# Patient Record
Sex: FEMALE | Race: WHITE | NOT HISPANIC OR LATINO | Employment: OTHER
[De-identification: names, ages, dates, MRNs, and addresses within clinical notes are randomized per-mention and may not be internally consistent; named-entity substitution may affect disease eponyms.]

---

## 2018-05-16 ENCOUNTER — RECORDS - HEALTHEAST (OUTPATIENT)
Dept: ADMINISTRATIVE | Facility: OTHER | Age: 78
End: 2018-05-16

## 2018-05-18 ENCOUNTER — RECORDS - HEALTHEAST (OUTPATIENT)
Dept: ADMINISTRATIVE | Facility: OTHER | Age: 78
End: 2018-05-18

## 2018-06-01 ENCOUNTER — RECORDS - HEALTHEAST (OUTPATIENT)
Dept: ADMINISTRATIVE | Facility: OTHER | Age: 78
End: 2018-06-01

## 2018-06-13 ENCOUNTER — RECORDS - HEALTHEAST (OUTPATIENT)
Dept: ADMINISTRATIVE | Facility: OTHER | Age: 78
End: 2018-06-13

## 2018-06-22 ENCOUNTER — RECORDS - HEALTHEAST (OUTPATIENT)
Dept: ADMINISTRATIVE | Facility: OTHER | Age: 78
End: 2018-06-22

## 2018-07-03 ENCOUNTER — RECORDS - HEALTHEAST (OUTPATIENT)
Dept: ADMINISTRATIVE | Facility: OTHER | Age: 78
End: 2018-07-03

## 2018-07-24 ENCOUNTER — RECORDS - HEALTHEAST (OUTPATIENT)
Dept: ADMINISTRATIVE | Facility: OTHER | Age: 78
End: 2018-07-24

## 2018-07-25 ENCOUNTER — RECORDS - HEALTHEAST (OUTPATIENT)
Dept: ADMINISTRATIVE | Facility: OTHER | Age: 78
End: 2018-07-25

## 2018-08-07 ENCOUNTER — RECORDS - HEALTHEAST (OUTPATIENT)
Dept: ADMINISTRATIVE | Facility: OTHER | Age: 78
End: 2018-08-07

## 2018-08-08 ENCOUNTER — RECORDS - HEALTHEAST (OUTPATIENT)
Dept: ADMINISTRATIVE | Facility: OTHER | Age: 78
End: 2018-08-08

## 2018-08-21 ENCOUNTER — RECORDS - HEALTHEAST (OUTPATIENT)
Dept: ADMINISTRATIVE | Facility: OTHER | Age: 78
End: 2018-08-21

## 2018-08-24 ENCOUNTER — COMMUNICATION - HEALTHEAST (OUTPATIENT)
Dept: ONCOLOGY | Facility: CLINIC | Age: 78
End: 2018-08-24

## 2018-09-17 ENCOUNTER — AMBULATORY - HEALTHEAST (OUTPATIENT)
Dept: SURGERY | Facility: CLINIC | Age: 78
End: 2018-09-17

## 2018-09-17 ENCOUNTER — OFFICE VISIT - HEALTHEAST (OUTPATIENT)
Dept: RADIATION ONCOLOGY | Facility: CLINIC | Age: 78
End: 2018-09-17

## 2018-09-17 DIAGNOSIS — Z17.1 MALIGNANT NEOPLASM OF UPPER-INNER QUADRANT OF RIGHT BREAST IN FEMALE, ESTROGEN RECEPTOR NEGATIVE (H): ICD-10-CM

## 2018-09-17 DIAGNOSIS — C50.211 MALIGNANT NEOPLASM OF UPPER-INNER QUADRANT OF RIGHT BREAST IN FEMALE, ESTROGEN RECEPTOR NEGATIVE (H): ICD-10-CM

## 2018-09-28 ENCOUNTER — OFFICE VISIT - HEALTHEAST (OUTPATIENT)
Dept: PHYSICAL THERAPY | Facility: REHABILITATION | Age: 78
End: 2018-09-28

## 2018-09-28 DIAGNOSIS — M25.611 DECREASED RANGE OF MOTION OF RIGHT SHOULDER: ICD-10-CM

## 2018-09-28 DIAGNOSIS — I89.0 LYMPHEDEMA: ICD-10-CM

## 2018-09-28 DIAGNOSIS — L90.5 SCAR CONDITION AND FIBROSIS OF SKIN: ICD-10-CM

## 2018-09-28 DIAGNOSIS — C50.919 BREAST CANCER (H): ICD-10-CM

## 2018-09-28 DIAGNOSIS — M62.81 MUSCLE WEAKNESS (GENERALIZED): ICD-10-CM

## 2018-10-05 ENCOUNTER — OFFICE VISIT - HEALTHEAST (OUTPATIENT)
Dept: PHYSICAL THERAPY | Facility: REHABILITATION | Age: 78
End: 2018-10-05

## 2018-10-05 DIAGNOSIS — M25.611 DECREASED RANGE OF MOTION OF RIGHT SHOULDER: ICD-10-CM

## 2018-10-05 DIAGNOSIS — C50.919 BREAST CANCER (H): ICD-10-CM

## 2018-10-05 DIAGNOSIS — I89.0 LYMPHEDEMA: ICD-10-CM

## 2018-10-05 DIAGNOSIS — L90.5 SCAR CONDITION AND FIBROSIS OF SKIN: ICD-10-CM

## 2018-10-05 DIAGNOSIS — M62.81 MUSCLE WEAKNESS (GENERALIZED): ICD-10-CM

## 2018-10-08 ENCOUNTER — AMBULATORY - HEALTHEAST (OUTPATIENT)
Dept: RADIATION ONCOLOGY | Facility: CLINIC | Age: 78
End: 2018-10-08

## 2018-10-08 DIAGNOSIS — I89.0 LYMPHEDEMA OF EXTREMITY: ICD-10-CM

## 2018-10-08 DIAGNOSIS — M25.611 DECREASED RANGE OF MOTION OF RIGHT SHOULDER: ICD-10-CM

## 2018-10-08 DIAGNOSIS — C50.211 MALIGNANT NEOPLASM OF UPPER-INNER QUADRANT OF RIGHT BREAST IN FEMALE, ESTROGEN RECEPTOR NEGATIVE (H): ICD-10-CM

## 2018-10-08 DIAGNOSIS — Z17.1 MALIGNANT NEOPLASM OF UPPER-INNER QUADRANT OF RIGHT BREAST IN FEMALE, ESTROGEN RECEPTOR NEGATIVE (H): ICD-10-CM

## 2018-10-09 ENCOUNTER — COMMUNICATION - HEALTHEAST (OUTPATIENT)
Dept: VASCULAR SURGERY | Facility: CLINIC | Age: 78
End: 2018-10-09

## 2018-10-10 ENCOUNTER — COMMUNICATION - HEALTHEAST (OUTPATIENT)
Dept: ONCOLOGY | Facility: CLINIC | Age: 78
End: 2018-10-10

## 2018-10-11 ENCOUNTER — OFFICE VISIT - HEALTHEAST (OUTPATIENT)
Dept: PHYSICAL THERAPY | Facility: REHABILITATION | Age: 78
End: 2018-10-11

## 2018-10-11 DIAGNOSIS — I89.0 LYMPHEDEMA: ICD-10-CM

## 2018-10-11 DIAGNOSIS — M25.611 DECREASED RANGE OF MOTION OF RIGHT SHOULDER: ICD-10-CM

## 2018-10-11 DIAGNOSIS — C50.919 BREAST CANCER (H): ICD-10-CM

## 2018-10-11 DIAGNOSIS — L90.5 SCAR CONDITION AND FIBROSIS OF SKIN: ICD-10-CM

## 2018-10-11 DIAGNOSIS — M62.81 MUSCLE WEAKNESS (GENERALIZED): ICD-10-CM

## 2018-10-16 ENCOUNTER — RECORDS - HEALTHEAST (OUTPATIENT)
Dept: ADMINISTRATIVE | Facility: OTHER | Age: 78
End: 2018-10-16

## 2018-10-16 ENCOUNTER — AMBULATORY - HEALTHEAST (OUTPATIENT)
Dept: RADIATION ONCOLOGY | Facility: HOSPITAL | Age: 78
End: 2018-10-16

## 2018-10-16 DIAGNOSIS — I89.0 LYMPHEDEMA OF EXTREMITY: ICD-10-CM

## 2018-10-17 ENCOUNTER — COMMUNICATION - HEALTHEAST (OUTPATIENT)
Dept: ONCOLOGY | Facility: CLINIC | Age: 78
End: 2018-10-17

## 2018-10-17 ENCOUNTER — COMMUNICATION - HEALTHEAST (OUTPATIENT)
Dept: PHYSICAL THERAPY | Facility: REHABILITATION | Age: 78
End: 2018-10-17

## 2018-10-19 ENCOUNTER — OFFICE VISIT - HEALTHEAST (OUTPATIENT)
Dept: PHYSICAL THERAPY | Facility: REHABILITATION | Age: 78
End: 2018-10-19

## 2018-10-19 DIAGNOSIS — L90.5 SCAR CONDITION AND FIBROSIS OF SKIN: ICD-10-CM

## 2018-10-19 DIAGNOSIS — I89.0 LYMPHEDEMA: ICD-10-CM

## 2018-10-19 DIAGNOSIS — M25.611 DECREASED RANGE OF MOTION OF RIGHT SHOULDER: ICD-10-CM

## 2018-10-19 DIAGNOSIS — C50.919 BREAST CANCER (H): ICD-10-CM

## 2018-10-19 DIAGNOSIS — M62.81 MUSCLE WEAKNESS (GENERALIZED): ICD-10-CM

## 2018-10-23 ENCOUNTER — OFFICE VISIT - HEALTHEAST (OUTPATIENT)
Dept: PHYSICAL THERAPY | Facility: REHABILITATION | Age: 78
End: 2018-10-23

## 2018-10-23 DIAGNOSIS — C50.919 BREAST CANCER (H): ICD-10-CM

## 2018-10-23 DIAGNOSIS — I89.0 LYMPHEDEMA: ICD-10-CM

## 2018-10-23 DIAGNOSIS — L90.5 SCAR CONDITION AND FIBROSIS OF SKIN: ICD-10-CM

## 2018-10-23 DIAGNOSIS — M62.81 MUSCLE WEAKNESS (GENERALIZED): ICD-10-CM

## 2018-10-23 DIAGNOSIS — M25.611 DECREASED RANGE OF MOTION OF RIGHT SHOULDER: ICD-10-CM

## 2018-10-29 ENCOUNTER — RECORDS - HEALTHEAST (OUTPATIENT)
Dept: ADMINISTRATIVE | Facility: OTHER | Age: 78
End: 2018-10-29

## 2018-10-31 ENCOUNTER — COMMUNICATION - HEALTHEAST (OUTPATIENT)
Dept: TELEHEALTH | Facility: CLINIC | Age: 78
End: 2018-10-31

## 2018-10-31 ENCOUNTER — OFFICE VISIT - HEALTHEAST (OUTPATIENT)
Dept: RADIATION ONCOLOGY | Facility: HOSPITAL | Age: 78
End: 2018-10-31

## 2018-10-31 ENCOUNTER — AMBULATORY - HEALTHEAST (OUTPATIENT)
Dept: RADIATION ONCOLOGY | Facility: HOSPITAL | Age: 78
End: 2018-10-31

## 2018-10-31 DIAGNOSIS — Z17.1 MALIGNANT NEOPLASM OF UPPER-INNER QUADRANT OF RIGHT BREAST IN FEMALE, ESTROGEN RECEPTOR NEGATIVE (H): ICD-10-CM

## 2018-10-31 DIAGNOSIS — C50.211 MALIGNANT NEOPLASM OF UPPER-INNER QUADRANT OF RIGHT BREAST IN FEMALE, ESTROGEN RECEPTOR NEGATIVE (H): ICD-10-CM

## 2018-10-31 LAB
CREAT SERPL-MCNC: 0.69 MG/DL (ref 0.6–1.1)
GFR SERPL CREATININE-BSD FRML MDRD: >60 ML/MIN/1.73M2

## 2018-11-08 ENCOUNTER — OFFICE VISIT - HEALTHEAST (OUTPATIENT)
Dept: RADIATION ONCOLOGY | Facility: CLINIC | Age: 78
End: 2018-11-08

## 2018-11-08 DIAGNOSIS — Z17.1 MALIGNANT NEOPLASM OF UPPER-INNER QUADRANT OF RIGHT BREAST IN FEMALE, ESTROGEN RECEPTOR NEGATIVE (H): ICD-10-CM

## 2018-11-08 DIAGNOSIS — C50.211 MALIGNANT NEOPLASM OF UPPER-INNER QUADRANT OF RIGHT BREAST IN FEMALE, ESTROGEN RECEPTOR NEGATIVE (H): ICD-10-CM

## 2018-11-12 ENCOUNTER — RECORDS - HEALTHEAST (OUTPATIENT)
Dept: ADMINISTRATIVE | Facility: OTHER | Age: 78
End: 2018-11-12

## 2018-11-12 ENCOUNTER — OFFICE VISIT - HEALTHEAST (OUTPATIENT)
Dept: VASCULAR SURGERY | Facility: CLINIC | Age: 78
End: 2018-11-12

## 2018-11-12 DIAGNOSIS — I97.2 POST-MASTECTOMY LYMPHEDEMA SYNDROME: ICD-10-CM

## 2018-11-12 DIAGNOSIS — C50.911 BREAST CANCER, STAGE 2, RIGHT (H): ICD-10-CM

## 2018-11-12 DIAGNOSIS — C50.211 MALIGNANT NEOPLASM OF UPPER-INNER QUADRANT OF RIGHT BREAST IN FEMALE, ESTROGEN RECEPTOR NEGATIVE (H): ICD-10-CM

## 2018-11-12 DIAGNOSIS — I89.0 ACQUIRED LYMPHEDEMA OF LEG: ICD-10-CM

## 2018-11-12 DIAGNOSIS — M24.511 CONTRACTURE OF RIGHT SHOULDER: ICD-10-CM

## 2018-11-12 DIAGNOSIS — Z17.1 MALIGNANT NEOPLASM OF UPPER-INNER QUADRANT OF RIGHT BREAST IN FEMALE, ESTROGEN RECEPTOR NEGATIVE (H): ICD-10-CM

## 2018-11-12 ASSESSMENT — MIFFLIN-ST. JEOR: SCORE: 1136.78

## 2018-11-15 ENCOUNTER — OFFICE VISIT - HEALTHEAST (OUTPATIENT)
Dept: RADIATION ONCOLOGY | Facility: CLINIC | Age: 78
End: 2018-11-15

## 2018-11-15 DIAGNOSIS — C50.211 MALIGNANT NEOPLASM OF UPPER-INNER QUADRANT OF RIGHT BREAST IN FEMALE, ESTROGEN RECEPTOR NEGATIVE (H): ICD-10-CM

## 2018-11-15 DIAGNOSIS — Z17.1 MALIGNANT NEOPLASM OF UPPER-INNER QUADRANT OF RIGHT BREAST IN FEMALE, ESTROGEN RECEPTOR NEGATIVE (H): ICD-10-CM

## 2018-11-21 ENCOUNTER — OFFICE VISIT - HEALTHEAST (OUTPATIENT)
Dept: RADIATION ONCOLOGY | Facility: CLINIC | Age: 78
End: 2018-11-21

## 2018-11-21 DIAGNOSIS — C50.211 MALIGNANT NEOPLASM OF UPPER-INNER QUADRANT OF RIGHT BREAST IN FEMALE, ESTROGEN RECEPTOR NEGATIVE (H): ICD-10-CM

## 2018-11-21 DIAGNOSIS — Z17.1 MALIGNANT NEOPLASM OF UPPER-INNER QUADRANT OF RIGHT BREAST IN FEMALE, ESTROGEN RECEPTOR NEGATIVE (H): ICD-10-CM

## 2018-11-27 ENCOUNTER — RECORDS - HEALTHEAST (OUTPATIENT)
Dept: ADMINISTRATIVE | Facility: OTHER | Age: 78
End: 2018-11-27

## 2018-11-29 ENCOUNTER — OFFICE VISIT - HEALTHEAST (OUTPATIENT)
Dept: PHYSICAL THERAPY | Facility: REHABILITATION | Age: 78
End: 2018-11-29

## 2018-11-29 ENCOUNTER — OFFICE VISIT - HEALTHEAST (OUTPATIENT)
Dept: RADIATION ONCOLOGY | Facility: CLINIC | Age: 78
End: 2018-11-29

## 2018-11-29 DIAGNOSIS — L90.5 SCAR CONDITION AND FIBROSIS OF SKIN: ICD-10-CM

## 2018-11-29 DIAGNOSIS — M62.81 MUSCLE WEAKNESS (GENERALIZED): ICD-10-CM

## 2018-11-29 DIAGNOSIS — Z17.1 MALIGNANT NEOPLASM OF UPPER-INNER QUADRANT OF RIGHT BREAST IN FEMALE, ESTROGEN RECEPTOR NEGATIVE (H): ICD-10-CM

## 2018-11-29 DIAGNOSIS — M25.611 DECREASED RANGE OF MOTION OF RIGHT SHOULDER: ICD-10-CM

## 2018-11-29 DIAGNOSIS — I89.0 LYMPHEDEMA: ICD-10-CM

## 2018-11-29 DIAGNOSIS — C50.211 MALIGNANT NEOPLASM OF UPPER-INNER QUADRANT OF RIGHT BREAST IN FEMALE, ESTROGEN RECEPTOR NEGATIVE (H): ICD-10-CM

## 2018-12-06 ENCOUNTER — OFFICE VISIT - HEALTHEAST (OUTPATIENT)
Dept: RADIATION ONCOLOGY | Facility: CLINIC | Age: 78
End: 2018-12-06

## 2018-12-06 DIAGNOSIS — Z17.1 MALIGNANT NEOPLASM OF UPPER-INNER QUADRANT OF RIGHT BREAST IN FEMALE, ESTROGEN RECEPTOR NEGATIVE (H): ICD-10-CM

## 2018-12-06 DIAGNOSIS — C50.211 MALIGNANT NEOPLASM OF UPPER-INNER QUADRANT OF RIGHT BREAST IN FEMALE, ESTROGEN RECEPTOR NEGATIVE (H): ICD-10-CM

## 2018-12-13 ENCOUNTER — OFFICE VISIT - HEALTHEAST (OUTPATIENT)
Dept: RADIATION ONCOLOGY | Facility: CLINIC | Age: 78
End: 2018-12-13

## 2018-12-13 DIAGNOSIS — C50.211 MALIGNANT NEOPLASM OF UPPER-INNER QUADRANT OF RIGHT BREAST IN FEMALE, ESTROGEN RECEPTOR NEGATIVE (H): ICD-10-CM

## 2018-12-13 DIAGNOSIS — Z17.1 MALIGNANT NEOPLASM OF UPPER-INNER QUADRANT OF RIGHT BREAST IN FEMALE, ESTROGEN RECEPTOR NEGATIVE (H): ICD-10-CM

## 2018-12-19 ENCOUNTER — OFFICE VISIT - HEALTHEAST (OUTPATIENT)
Dept: PHYSICAL THERAPY | Facility: REHABILITATION | Age: 78
End: 2018-12-19

## 2018-12-19 DIAGNOSIS — L90.5 SCAR CONDITION AND FIBROSIS OF SKIN: ICD-10-CM

## 2018-12-19 DIAGNOSIS — M62.81 MUSCLE WEAKNESS (GENERALIZED): ICD-10-CM

## 2018-12-19 DIAGNOSIS — M25.611 DECREASED RANGE OF MOTION OF RIGHT SHOULDER: ICD-10-CM

## 2018-12-19 DIAGNOSIS — I89.0 LYMPHEDEMA: ICD-10-CM

## 2018-12-20 ENCOUNTER — OFFICE VISIT - HEALTHEAST (OUTPATIENT)
Dept: RADIATION ONCOLOGY | Facility: CLINIC | Age: 78
End: 2018-12-20

## 2018-12-20 DIAGNOSIS — C50.211 MALIGNANT NEOPLASM OF UPPER-INNER QUADRANT OF RIGHT BREAST IN FEMALE, ESTROGEN RECEPTOR NEGATIVE (H): ICD-10-CM

## 2018-12-20 DIAGNOSIS — L73.9 FOLLICULITIS: ICD-10-CM

## 2018-12-20 DIAGNOSIS — Z17.1 MALIGNANT NEOPLASM OF UPPER-INNER QUADRANT OF RIGHT BREAST IN FEMALE, ESTROGEN RECEPTOR NEGATIVE (H): ICD-10-CM

## 2018-12-26 ENCOUNTER — OFFICE VISIT - HEALTHEAST (OUTPATIENT)
Dept: RADIATION ONCOLOGY | Facility: CLINIC | Age: 78
End: 2018-12-26

## 2018-12-26 DIAGNOSIS — Z17.1 MALIGNANT NEOPLASM OF UPPER-INNER QUADRANT OF RIGHT BREAST IN FEMALE, ESTROGEN RECEPTOR NEGATIVE (H): ICD-10-CM

## 2018-12-26 DIAGNOSIS — C50.211 MALIGNANT NEOPLASM OF UPPER-INNER QUADRANT OF RIGHT BREAST IN FEMALE, ESTROGEN RECEPTOR NEGATIVE (H): ICD-10-CM

## 2018-12-27 ENCOUNTER — COMMUNICATION - HEALTHEAST (OUTPATIENT)
Dept: ONCOLOGY | Facility: CLINIC | Age: 78
End: 2018-12-27

## 2019-01-03 ENCOUNTER — COMMUNICATION - HEALTHEAST (OUTPATIENT)
Dept: RADIATION ONCOLOGY | Facility: CLINIC | Age: 79
End: 2019-01-03

## 2019-01-28 ENCOUNTER — COMMUNICATION - HEALTHEAST (OUTPATIENT)
Dept: RADIATION ONCOLOGY | Facility: CLINIC | Age: 79
End: 2019-01-28

## 2019-02-04 ENCOUNTER — OFFICE VISIT - HEALTHEAST (OUTPATIENT)
Dept: RADIATION ONCOLOGY | Facility: CLINIC | Age: 79
End: 2019-02-04

## 2019-02-04 DIAGNOSIS — C50.211 MALIGNANT NEOPLASM OF UPPER-INNER QUADRANT OF RIGHT BREAST IN FEMALE, ESTROGEN RECEPTOR NEGATIVE (H): ICD-10-CM

## 2019-02-04 DIAGNOSIS — Z17.1 MALIGNANT NEOPLASM OF UPPER-INNER QUADRANT OF RIGHT BREAST IN FEMALE, ESTROGEN RECEPTOR NEGATIVE (H): ICD-10-CM

## 2019-02-13 ENCOUNTER — COMMUNICATION - HEALTHEAST (OUTPATIENT)
Dept: ONCOLOGY | Facility: CLINIC | Age: 79
End: 2019-02-13

## 2019-02-18 ENCOUNTER — OFFICE VISIT - HEALTHEAST (OUTPATIENT)
Dept: VASCULAR SURGERY | Facility: CLINIC | Age: 79
End: 2019-02-18

## 2019-02-18 DIAGNOSIS — Z17.1 MALIGNANT NEOPLASM OF UPPER-INNER QUADRANT OF RIGHT BREAST IN FEMALE, ESTROGEN RECEPTOR NEGATIVE (H): ICD-10-CM

## 2019-02-18 DIAGNOSIS — C50.211 MALIGNANT NEOPLASM OF UPPER-INNER QUADRANT OF RIGHT BREAST IN FEMALE, ESTROGEN RECEPTOR NEGATIVE (H): ICD-10-CM

## 2019-02-18 DIAGNOSIS — M25.611 DECREASED RANGE OF MOTION OF RIGHT SHOULDER: ICD-10-CM

## 2019-02-18 DIAGNOSIS — I97.2 POST-MASTECTOMY LYMPHEDEMA SYNDROME: ICD-10-CM

## 2019-02-18 ASSESSMENT — MIFFLIN-ST. JEOR: SCORE: 1119.09

## 2019-03-10 ENCOUNTER — COMMUNICATION - HEALTHEAST (OUTPATIENT)
Dept: RADIATION ONCOLOGY | Facility: CLINIC | Age: 79
End: 2019-03-10

## 2019-05-02 ENCOUNTER — COMMUNICATION - HEALTHEAST (OUTPATIENT)
Dept: ADMINISTRATIVE | Facility: HOSPITAL | Age: 79
End: 2019-05-02

## 2019-05-20 ENCOUNTER — OFFICE VISIT - HEALTHEAST (OUTPATIENT)
Dept: RADIATION ONCOLOGY | Facility: CLINIC | Age: 79
End: 2019-05-20

## 2019-05-20 DIAGNOSIS — Z17.1 MALIGNANT NEOPLASM OF UPPER-INNER QUADRANT OF RIGHT BREAST IN FEMALE, ESTROGEN RECEPTOR NEGATIVE (H): ICD-10-CM

## 2019-05-20 DIAGNOSIS — C50.211 MALIGNANT NEOPLASM OF UPPER-INNER QUADRANT OF RIGHT BREAST IN FEMALE, ESTROGEN RECEPTOR NEGATIVE (H): ICD-10-CM

## 2019-06-12 ENCOUNTER — OFFICE VISIT - HEALTHEAST (OUTPATIENT)
Dept: VASCULAR SURGERY | Facility: CLINIC | Age: 79
End: 2019-06-12

## 2019-06-12 DIAGNOSIS — Z17.1 MALIGNANT NEOPLASM OF UPPER-INNER QUADRANT OF RIGHT BREAST IN FEMALE, ESTROGEN RECEPTOR NEGATIVE (H): ICD-10-CM

## 2019-06-12 DIAGNOSIS — Z85.3 HISTORY OF LEFT BREAST CANCER: ICD-10-CM

## 2019-06-12 DIAGNOSIS — I97.2 POST-MASTECTOMY LYMPHEDEMA SYNDROME: ICD-10-CM

## 2019-06-12 DIAGNOSIS — M25.611 DECREASED RANGE OF MOTION OF RIGHT SHOULDER: ICD-10-CM

## 2019-06-12 DIAGNOSIS — C50.211 MALIGNANT NEOPLASM OF UPPER-INNER QUADRANT OF RIGHT BREAST IN FEMALE, ESTROGEN RECEPTOR NEGATIVE (H): ICD-10-CM

## 2019-06-12 ASSESSMENT — MIFFLIN-ST. JEOR: SCORE: 1118.18

## 2019-06-24 ENCOUNTER — COMMUNICATION - HEALTHEAST (OUTPATIENT)
Dept: OTHER | Facility: CLINIC | Age: 79
End: 2019-06-24

## 2019-07-11 ENCOUNTER — COMMUNICATION - HEALTHEAST (OUTPATIENT)
Dept: OTHER | Facility: CLINIC | Age: 79
End: 2019-07-11

## 2019-07-17 ENCOUNTER — AMBULATORY - HEALTHEAST (OUTPATIENT)
Dept: VASCULAR SURGERY | Facility: CLINIC | Age: 79
End: 2019-07-17

## 2019-07-17 DIAGNOSIS — I97.2 POST-MASTECTOMY LYMPHEDEMA SYNDROME: ICD-10-CM

## 2019-07-17 DIAGNOSIS — C50.911 BREAST CANCER, STAGE 2, RIGHT (H): ICD-10-CM

## 2019-07-18 ENCOUNTER — COMMUNICATION - HEALTHEAST (OUTPATIENT)
Dept: VASCULAR SURGERY | Facility: CLINIC | Age: 79
End: 2019-07-18

## 2019-08-06 ENCOUNTER — COMMUNICATION - HEALTHEAST (OUTPATIENT)
Dept: ONCOLOGY | Facility: CLINIC | Age: 79
End: 2019-08-06

## 2020-06-18 ENCOUNTER — OFFICE VISIT - HEALTHEAST (OUTPATIENT)
Dept: VASCULAR SURGERY | Facility: CLINIC | Age: 80
End: 2020-06-18

## 2020-06-18 DIAGNOSIS — I97.2 POST-MASTECTOMY LYMPHEDEMA SYNDROME: ICD-10-CM

## 2020-06-18 DIAGNOSIS — Z17.1 MALIGNANT NEOPLASM OF UPPER-INNER QUADRANT OF RIGHT BREAST IN FEMALE, ESTROGEN RECEPTOR NEGATIVE (H): ICD-10-CM

## 2020-06-18 DIAGNOSIS — C50.211 MALIGNANT NEOPLASM OF UPPER-INNER QUADRANT OF RIGHT BREAST IN FEMALE, ESTROGEN RECEPTOR NEGATIVE (H): ICD-10-CM

## 2020-06-18 DIAGNOSIS — Z85.3 HISTORY OF LEFT BREAST CANCER: ICD-10-CM

## 2020-06-18 DIAGNOSIS — M25.611 DECREASED RANGE OF MOTION OF RIGHT SHOULDER: ICD-10-CM

## 2020-06-24 ENCOUNTER — COMMUNICATION - HEALTHEAST (OUTPATIENT)
Dept: VASCULAR SURGERY | Facility: CLINIC | Age: 80
End: 2020-06-24

## 2021-02-03 ENCOUNTER — AMBULATORY - HEALTHEAST (OUTPATIENT)
Dept: NURSING | Facility: CLINIC | Age: 81
End: 2021-02-03

## 2021-02-24 ENCOUNTER — AMBULATORY - HEALTHEAST (OUTPATIENT)
Dept: NURSING | Facility: CLINIC | Age: 81
End: 2021-02-24

## 2021-05-28 NOTE — PROGRESS NOTES
Patient here ambulatory accompanied by  for follow up s/p radiation for her breast cancer.  Patient feeling much better than last visit when she was dealing with pneumonia.  Still doing ROM and seeing Dr. Jaramillo for lymphedema and tightness in axilla.  Stamina greatly improved, still has neuropathies but feels this is slightly better as well.  Seen by Dr. Barrera.  Plan RTC for follow up as directed by physician.

## 2021-05-28 NOTE — PROGRESS NOTES
[facility] Radiation Oncology Follow Up Note    Patient: Jody Ch  MRN: [id number]  Date of Service: 05/20/2019    Assessment:     1. Malignant neoplasm of upper-inner quadrant of right breast in female, 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)        Distress Assessment Score  Distress Assessment Score: No distress  Interventions  Distress Assessment Intervention: Provider Notified  Body site: Breast     Impression/Plan:   Right breast cancer, s/p mastectomy (no reconstruction) 6/1/2018, triple negative, 2.5 cm IDC grade II, lymphovascular invasion present, 4/13 LN pos with largest being 1.2 cm. s/p ddAC + Neulasta and Taxol. Hx of left breast cancer ER/OK pos 2006, s/p left mastectomy + 5 years Arimidex. RUE end ROM limited, gradually improving, seeing Dr. Jaramillo.     1. ROM improved or at least stable from previous visit, although still not completely full. Still some tightness, admits to forgetting at times to do ROM.  Discussed ongoing importance of massage and  ROM exercises and keep F/U with Dr. Jaramillo for further evaluation and treatment options.  2. Return to myself PRN.  3. No evidence of disease    Face to face time  25 minutes with > 75% spent on consultation, education and coordination of care.    Subjective:      HPI: Jody Ch is a 78 y.o. female who was treated 3D conformal radiation therapy for right sided breast cancer, s/p right mastectomy and left mastectomy (2006).     The patient was originally diagnosed with left breast cancer in 2006, IDC grade II, DCIS, ER/OK pos, HER-2 neg. She subsequently underwent radical mastectomy and completed 5 years of Arimidex. No radiation at that time.     Her most recent screening mammogram in May 2018 showed an abnormality in the upper quadrant of the right breast. She subsequently underwent US left breast biopsy which showed IDC grade I, ER/OK neg. She then had right mastectomy with SLN biopsy on 6/1/2018, final pathology returned with 2.5 cm IDC  grade II, lymphovascular invasion present, 4/13 LN pos with largest being 1.2 cm, HER-2 negative by FISH.      The patient has underwent PET CT without evidence of metastatic disease. A left thyroid nodule has been biopsied which was negative. CT chest showing stable lung nodules. She has completed 4 cycles ddAC + Neulasta support chemotherapy. She started Taxol on 9/04/2018 and finished C4D1 on 10/16/2018. She required PT for ROM and lymphedema.     SITE TREATED: right CW  TOTAL DOSE: 6040  NUMBER OF FRACTIONS: 33  DATES COMPLETED: 11/08/2018 - 12/27/2018  CONCURRENT CHEMOTHERAPY: No  ADJUVANT THERAPY:Yes     She tolerated the treatment without unexpected side effects.     She presents today for routine follow up regarding her range of motion. She continues to massage the area and occasionally performs ROM exercises. Otherwise no complaints.     Current Outpatient Medications   Medication Sig Dispense Refill     acetaminophen (TYLENOL) 500 MG tablet Take 1,000 mg by mouth every 6 (six) hours as needed.       calcium carbonate (OS-PENNY) 500 mg calcium (1,250 mg) chewable tablet Chew 500 mg 3 (three) times a day.       cholecalciferol, vitamin D3, (VITAMIN D3) 400 unit cap Take 2,000 Units by mouth daily.       cyanocobalamin 1000 MCG tablet Take 1,000 mcg by mouth.       diphenhydrAMINE (BENADRYL) 25 mg tablet Take 12.5 mg by mouth at bedtime as needed.       escitalopram oxalate (LEXAPRO) 10 MG tablet Take 10 mg by mouth daily.       Current Facility-Administered Medications   Medication Dose Route Frequency Provider Last Rate Last Dose     heparin 100 unit/mL lockflush (PF) porcine 300-600 Units  300-600 Units Intravenous PRN Janine Barrera MD   600 Units at 10/31/18 1200       The following portions of the patient's history were reviewed and updated as appropriate: allergies, current medications, past family history, past medical history, past social history, past surgical history and problem  list.    Review of Systems    General  Constitutional (WDL): Exceptions to WDL  Fatigue: Fatigue relieved by rest(improving)  EENT  Eye Disorder (WDL): Exceptions to WDL(wears glasses)  Dry Eye: Asymptomatic, clinical or diagnostic observations only, mild symptoms relieved by lubricants  Ear Disorder (WDL): All ear disorder elements are within defined limits  Respiratory       Respiratory (WDL): Within Defined Limits  Cardiovascular  Cardiovascular (WDL): All cardiovascular elements are within defined limits  Endocrine     Gastrointestinal  Gastrointestinal (WDL): Exceptions to WDL  Dysgeusia: Altered taste but no change in diet(still not normal but improving)  Musculoskeletal  Musculoskeletal and Connetive Tissue Disorders (WDL): Exceptions to WDL  Arthralgia: Mild pain(occ legs & feet)  Integumentary               Integumentary (WDL): All integumentary elements are within defined limits  Neurological  Neurosensory (WDL): Exceptions to WDL  Peripheral Motor Neuropathy: Moderate symptoms, limiting instrumental ADL(feet, fingers, tongue improving )  Ataxia: Asymptomatic, clinical or diagnostic observations only, intervention not indicated(feet neuropathies)  Peripheral Sensory Neuropathy: Moderate symptoms, limiting instrumental ADL(feet, fingers, tongue improving)  Psychological/Emotional   Patient Coping: Accepting  Hematological/Lymphatic  Lymph (WDL): All lymph disorder elements are within defined limits  Dermatologic     Genitourinary/Reproductive  Genitourinary (WDL): All genitourinary elements are within defined limits  Reproductive     Pain              Currently in Pain: Yes  Pain Score (Initial OR Reassessment): No/Denies Pain  Pain Frequency: Intermittent  Location: legs & feet  Pain Intervention(s): Home medication  Response to Interventions: good  AUA Assessment                                  Accompanied by  Accompanied by: Family Member      Objective:     Physical Exam    Vitals:    05/20/19 1105    BP: 128/72   Pulse: 86   Temp: 98.3  F (36.8  C)   TempSrc: Oral   SpO2: 96%   Weight: 154 lb 3.2 oz (69.9 kg)        General: No obvious distress, comfortable appearing.  Cooperative in conversation.   Head: Normocephalic, atraumatic.   ENT: pupils are equal, round and reactive. Oromucosa moist.  Lungs: Normal respiratory effort.  Breasts: Radiation skin reaction resolved.   Skin: Skin color, texture, turgor normal. No rashes or lesions  MSK: No lymphedema, near full ROM of RUE, full ROM LUE.  Neurologic: Grossly normal.  Psych: affect normal, thought content appropriate.       Recent Labs: No results found for this or any previous visit (from the past 168 hour(s)).    Imaging: Imaging results 30 days: No results found.    IJanine MD personally performed the services described in this documentation, as scribed by Harshad Agarwal in my presence, and it is both accurate and complete.    Signed by: Janine Barrera MD, MPH

## 2021-05-28 NOTE — TELEPHONE ENCOUNTER
Left message for patient to call to reschedule her 5/9/19 f/u w/Dr. Barrera. Relayed that 5/13 or 5/20 morning appointments are available.    Patient returned call.  Follow-up rescheduled for 5/20/19 @ 11:30.

## 2021-05-29 NOTE — PATIENT INSTRUCTIONS - HE
Please call one of the [place] locations below to schedule an appointment. If you received a prescription please bring it with you to your appointment. Some locations are limited to what they carry.    Office Locations    [facility]  Home Medical Equipment  [address], [address], [place] [address]   Phone: [phone]    Orthotics and Prosthetics ([facility])    [address], [address], [place] [address]  Phone: [phone]      Continues exercising and wearing compression sleeve. Call to [facility] Tel  if you do have any question.     Thank you for choosing [facility].

## 2021-05-29 NOTE — PROGRESS NOTES
Doing therapy exercises at home. Tingling in fingers, she believes it is from chemo. Has compression sleeve but hasn't needed to wear it recently.

## 2021-05-29 NOTE — PROGRESS NOTES
Date Of Service: 06/12/2019    Date last Seen:  02/18/19    PCP: Helga Olmedo MD    Impression:  1. Bilateral post mastectomy lymphedema-excellent control  2. Right shoulder contracture with fibrosis and scarring improved  3. 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 Questions were answered.   present.  2. Doing well with range of motion.  Continue.  3. She will continue her exercises.  She knows how to wear her compression appropriately.  We discussed how to increase her exercise program.  4. New prostheses and bras.  Written.  5. Patient will follow up in 1 year or when needed.    Time spent with patient 15 minutes, with greater than 50% time in consultation, education and coordination of care, excluding procedures.  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   In May 2018 she was diagnosed with grade 1 ER WV negative breast carcinoma treated with right mastectomy with lymph node dissection done with final pathology being a 2.5 cm grade 2 here to negative ER WV negative breast carcinoma followed by chemotherapy and radiation.  She was felt to be at risk for bilateral arm lymphedema.  Her main issue however is significant tightness in the shoulders with contractures especially on the right side.  She was sent to therapy which was beneficial.  She has continued her exercises.  She is here for her follow-up.  She states things are going well.  There is been no evidence of new  cancer.  Her checks have been clear.  She is completed all her treatments.  She has been very good about doing her regular range of motion and stretching.  Her  is here with her and he also helps her.  She has the numbness in the hands and feet distally from the chemotherapy.  There has been otherwise no new numbness, tingling or weakness.  There have been no new masses, rashes, or swellings of any other joints. There have been no infections.  There has been no new unexplained weight loss, loss of appetite, nausea and/or vomiting, shortness of breath, weight loss and chest pain.  She has not had any problems with significant swelling and she has been wearing the compression appropriately.  She is starting to try to get back to a more active exercise program as she is feeling better.      Past Medical History:   Diagnosis Date     Anxiety state 1/31/2017     Atrophic vaginitis 5/8/2014    Overview:  UPDATE: Followed-up with Dr. Cassie Arteaga on 5-8-14. Noted that patient did not want to pay for the estrogen cream. Impression female stress incontinence and atrophic vaginitis.     Bilateral leg paresthesia 5/13/2014    Overview:  US FRANCISCO W EXERCISE BILATERAL 5-14-14: IMPRESSION: RIGHT LOWER EXTREMITY: FRANCISCO at rest is normal with a normal response to exercise. These findings would not be consistent with symptoms of arterial claudication. LEFT LOWER EXTREMITY: FRANCISCO at rest is normal with a normal response to exercise. These findings would not be consistent with symptoms of arterial claudication. US VENOUS LOWER EXTREMITY LEFT 5-14-14: Left leg veins are negative for DVT. EMG 5-19-14: Borderline abnormal EMG due to low amplitude compound motor action potential in the motor nerves with normal conduction velocities. This could suggest early axonal polyneuropathy. Such changes can be seen in patient's with prediabetes.     Bone pain 7/24/2018     Breast cancer (H)      Breast cancer, stage 2, right (H) 5/16/2018     Overview:  Added automatically from request for surgery [id number]     Chronic cough 2/12/2018    Overview:  XR CHEST 2 VIEWS PA AND LATERAL 12-4-17: Normal heart size and pulmonary vascularity. Tiny granulomas with some fibrosis in the right lung base. The left lung is clear. Slight deviation of the upper trachea from left to right presumably due to thyroid enlargement stable. No change from previous. No acute process is identified. No focal pulmonary infiltrates.     Chronic diarrhea 5/12/2017    Overview:  UPDATE: COLONOSCOPY DIAGNOSTIC 7-25-17: Aphthous ulcer of ileum. Diverticulosis of colon without diverticulitis. Pathology Results: NEOTERMINAL ILEUM,  BIOPSY: Nonspecific chronic active ileitis. No dysplasia or malignancy. COLON, RANDOM, BIOPSY: Normal colonic mucosa STOOL TESTS on 5-12-17 for culture, Clostridium dificile toxin, WBC, Giardia antigen, Campylobacter, Shiga toxin, Stool for ova and parasite were negative       Chronic pain of right knee 10/23/2017    Overview:  XR KNEE 3 VIEWS RIGHT 10-23-17: Negative knee. No fracture or dislocation. No joint effusion.     Chronic rhinitis 6/8/2017     Chronic right hip pain 10/23/2017    Overview:  XR HIP 2 OR 3 VIEWS W PELVIS RIGHT 10-23-17: Arthritic change right hip. Pelvis otherwise negative.     Decreased range of motion of right shoulder 10/8/2018     Diarrhea 7/24/2018     Difficult or painful urination 12/8/2009    Overview:  UPDATE: Followed-up with Dr. Cassie Arteaga on 5-8-14. Noted that patient did not want to pay for the estrogen cream. Impression female stress incontinence and atrophic vaginitis. Exacerbation of her dysuria symptoms. She has not used her estrace vaginal cream due to cost noted 9-10-13. Urinalysis negative on 9-10-13. Urinalysis and urine microscopy showed some signs of urinary tract infection 9-12-13 . Prescription for ciprofloxacin 250 mg twice daily for 7 days sent to pharmacy. Followed-up with Dr. Bayron Seymour 11-5-13. Rx  ciprofloxacin 500 mg twice daily (#30 tablets) so she can self-treat as needed for signs of UTI. Estrace not covered by insurance and so not refilled. Consider Premarin cream.  Dysuria 12-8-09 with negative UA and urine culture. Dysuria 9-28-10 with negative UA and urine microscopy. Advised urology consult 12-8-09 and 9-28-10 if problem recurrent. Advised Pyridium prn on 9-28-10.Previous biofeedback treatment for urge incontinence, urinary frequency and dysuria. Followed-up M     Drug-induced nausea and vomiting 7/17/2018     Dry mouth 5/24/2016     Gallbladder polyp 4/14/2012    Overview:  UPDATE:US ABDOMEN LIMITED RUQ 4-18-16: 5 mm stone versus polyp in the gallbladder, decreased in size since comparison study where measured 7 mm. The gallbladder is contracted despite being NPO, which limits evaluation. Benign parapelvic cyst in the lower pole of the right kidney measuring 2.8 cm, is unchanged. US ABDOMEN LIMITED RUQ on 2-3-14:  Stable appearance of a 8 mm cholesterol polyp within the medial wall of the gallbladder. Incidentally noted is a small 7 mm hyperechoic focus within the right hepatic lobe. This likely reflects an incidental small cavernous hemangioma. US ABDOMEN LIMITED 12-3-12: Stable appearance of a 7 mm cholesterol polyp within the gallbladder.  CT ABD/PELVIS W/WO IV CONTRAST 3-23-12: Bilateral renal parapelvic cysts. Urinary bladder appears normal. Atrophic uterus and right adnexa. Either bilobed cyst or 2 separate cysts on atrophic left adnexa. Nodular foci in gallbladder suggestive of stones or polyps. Recommend gallbladder sonogram. US ABDOMEN LIMITED 4-6-12: Mul     Ganglion cyst of flexor tendon sheath of finger of right hand 5/24/2016     History of breast cancer 11/21/2006    Overview:  UPDATE: XR MAMMO UNI SCREEN FFDM RIGHT 4-14-14: ACR 1 Negative. Followed-up with oncologist Dr. Phyllis Colon on 4-29-14. Stable.    XR MAMMO UNI SCREEN FFDM RIGHT: 4-4-12, 4-5-13: ACR 2 Benign Finding.  Followed-up with Dr. Phyllis Colon 5-23-13. CBC and CMP normal except for low glucose of 55 . CA 27-29 normal.   Followed-up with Dr. Phyllis Colon 11-16-12. CBC and CMP normal except for low glucose of 67 . CA 27-29 normal. stage I infiltrating ductal carcinoma, s/p left radical mastectomy, T1c N0 M0, ER/MD (+), on Arimedex started 2-2007 and needed for 5 years (until 2-2012). Oncologist [facility] (Dr. Louise Burns). Follow-up oncologist 11-9-11. Discontinue Arimidex in  per patient request due to muscle ache. Discontinue Fosamax once current prescription is done as bone density should improve once off Arimidex. .     Hypercholesteremia 12/8/2014    Overview:  UPDATE: Rx atorvastatin (Lipitor) 1-22-15. She sent medical message on 3-18-15 requesting to take atorvastatin (Lipitor) 20 mg tablet every other day. This would not work for atorvastatin (Lipitor) . I advised to take half tablet (10 mg) daily rather than taking one tablet every other day. ASCVD Risk  10 year risk 7.9 % calculated on 12/8/2014.  Recommendation moderate to high - intensity statin.      IC (interstitial cystitis) 12/2/2010    Overview:  UPDATE: UPDATE: Followed-up with Dr. Cassie Arteaga on 5-8-14. Noted that patient did not want to pay for the estrogen cream. Impression female stress incontinence and atrophic vaginitis. Exacerbation of her dysuria symptoms. She has not used her estrace vaginal cream due to cost noted 9-10-13. Urinalysis negative on 9-10-13. Urinalysis and urine microscopy showed some signs of urinary tract infection 9-12-13 . Prescription for ciprofloxacin 250 mg twice daily for 7 days sent to pharmacy. Followed-up with Dr. Bayron Seymour 11-5-13. Rx ciprofloxacin 500 mg twice daily (#30 tablets) so she can self-treat as needed for signs of UTI. Estrace not covered by insurance and so not refilled. Consider Premarin cream.  Previous biofeedback treatment for urge incontinence, urinary frequency and dysuria.  Followed-up [facility] since 12-2-10 for interstitial cystitis. Work-up by urologist, Dr. Cassie Arteaga, since 2-27-12 regarding urinary urge incontinence and chronic interstitial cystitis. Rx estrace vaginal c     Ileitis 7/25/2017    Overview:  COLONOSCOPY DIAGNOSTIC 7-25-17: Aphthous ulcer of ileum. Diverticulosis of colon without diverticulitis. Pathology Results: NEOTERMINAL ILEUM,  BIOPSY: Nonspecific chronic active ileitis. No dysplasia or malignancy. COLON, RANDOM, BIOPSY: Normal colonic mucosa . Advised to follow-up with [facility] regarding chronic active ileitis.     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     Left thyroid nodule 4/30/2018     Lymphedema of extremity 10/8/2018     Major depression, recurrent, full remission (H) 1/31/2017    Overview:  Inpatient treatment for depression after divorce in 1983. Major depression diagnosed due to financial worries diagnosed 11-23-10 by oncologist, Louise Burns. Citalopram 20 mg daily started. Citalopram discontinued on 5-19-11 per patient request.     Malignant neoplasm of upper-inner quadrant of right breast in female, 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) 10/8/2018     Mixed stress and urge urinary incontinence 7/24/2006    Overview:  UPDATE: Followed-up with Dr. Cassie Arteaga on 5-8-14. Noted that patient did not want to pay for the estrogen cream. Impression female stress incontinence and atrophic vaginitis.  Exacerbation of her dysuria symptoms. She has not used her estrace vaginal cream due to cost noted 9-10-13. Urinalysis negative on 9-10-13. Urinalysis and urine microscopy showed some signs of urinary tract infection 9-12-13 . Prescription for ciprofloxacin 250 mg twice daily for 7 days sent to pharmacy. Followed-up with Dr. Bayron Seymour 11-5-13. Rx ciprofloxacin 500 mg twice daily (#30  tablets) so she can self-treat as needed for signs of UTI. Estrace not covered by insurance and so not refilled. Consider Premarin cream. Previous biofeedback treatment for urge incontinence, urinary frequency and dysuria. Followed-up [facility] since 12-2-10 for interstitial cystitis. Work-up by urologist, Dr. Cassie Arteaga, since 2-27-12 regarding urinary urge incontinence and chronic interstitial cystitis. Rx estrace vaginal cream wit     Nasal congestion 6/5/2014     Osteopenia 11/1/2006    Overview:  UPDATE:  XR DXA BONE DENSITY 2 SITES AXIAL 5/16/2018: Osteopenia. Lumbar Spine L1-L4 T-score -1.8 . Mean Bilateral Femoral Neck T-score -1.4 . FRAX Results: 10-year probability of major osteoporotic fracture is 10.8%, and of hip fracture is 2%, based on left femoral neck BMD. Basic bone health recommended.  XR DXA BONE DENSITY 2 SITES AXIAL 4-18-16: T score AP spine -2.1, Left femoral neck -1.1, R femoral neck -1.5. FRAX major osteoporotic fracture 11% and FRAX hip fracture score 2.0 %. Basic bone health recommended.  DEXA scan 4-14-14 T score -2.1 AP spine, -1.4 left femoral neck and -1.5 on right femoral neck. FRAX score not calculated. Bone density test stable. Recheck 4-2016. DEXA scan 4-4-12 T score -2.1 AP spine, -1.3 left femoral neck and -1.2 on right femoral neck. FRAX major osteoporotic score 9.6% and FRXA hip fracture score 1.3%. Recheck 4-2014 DEXA scan 3-20-08 T score -2.2 spine, -1.0 left femoral neck, -1.2 right femoral neck. DEXA 3-2-10: T score -1.7 AP spine, -1.0 L femoral ne     Other emphysema (H) 4/30/2018     Other specified disorders of nose and nasal sinuses 7/24/2018     Peripheral axonal neuropathy 11/22/2011    Overview:  UPDATE:  She thinks gabapentin (Neurontin) has been helpful without side effect of leg swelling discussed on 11-20-14. She agreed to increase dose to 300 mg at bedtime. She called on 10-30-14 requesting for gabapentin (Neurontin) as nortriptyline not helpful. Reminded  her that she complained of leg swelling with gabapentin (Neurontin) in the past.  EMG 5-19-14: Borderline abnormal EMG due to low amplitude compound motor action potential in the motor nerves with normal conduction velocities. This could suggest early axonal polyneuropathy. Such changes can be seen in patient's with prediabetes. Neurology follow-up on 4-2-13. Advised to give nortriptyline 25 mg daily another try. If unable to tolerate, consider duloxetine (Cymbalta) . Neurology consult Dr. Luther Armenta on 3-26-12. Impression essential tremor. MRI brain. Neurontin 100 mg bid started to help tremors and bilateral lower extremity paresthesia thought due to impaired FG or prediabetes. Normal EMG of lower extremity on 4-11-12 but comp     Personal history of tobacco use, presenting hazards to health 10/22/2018    Overview:  20 pack years     Pharyngeal dysphagia 12/8/2009     Recurrent UTI 9/12/2013    Overview:  UPDATE: Followed-up with Dr. Cassie Arteaga on 5-8-14. Noted that patient did not want to pay for the estrogen cream. Impression female stress incontinence and atrophic vaginitis.  Exacerbation of her dysuria symptoms. She has not used her estrace vaginal cream due to cost noted 9-10-13. Urinalysis negative on 9-10-13. Urinalysis and urine microscopy showed some signs of urinary tract infection 9-12-13 . Prescription for ciprofloxacin 250 mg twice daily for 7 days sent to pharmacy. Followed-up with Dr. Bayron Seymour 11-5-13. Rx ciprofloxacin 500 mg twice daily (#30 tablets) so she can self-treat as needed for signs of UTI. Estrace not covered by insurance and so not refilled. Consider Premarin cream.  Recheck Urinalysis and urine microscopy showed some signs of urinary tract infection 9-12-13 . Prescription for ciprofloxacin 250 mg twice daily for 7 days sent to pharmacy.     Salivation excessive 12/17/2008    Overview:  may be due to postnasal drip causing excessive salivation as she tends to swallow the postnasal  drainage.She is not candidate for tricyclic antidepressant like nortriptyline (can cause dry mouth) to lessen salivary secretions as this would excerbate her dry lips.     SOB (shortness of breath) 5/13/2014    Overview:  XR CHEST 2 VIEWS PA AND LATERAL 5-13-14: Impression: On the lateral view, a small patchy opacity is again visualized and has a few linear markings. This may be chronic, it is suggested on the prior exam slightly more prominent but this is probably due to technique, could represent chronic areas of subsegmental volume loss or previous consolidation. It is not well visualized on the PA view. Overall heart size and pulmonary vascular are normal. No pleural abnormalities. Stable appearance of somewhat confluent markings in the right apex as well as superimposition with the right 1st costochondral junction. No additional areas of significant consolidation.     Tremor, essential 11/22/2011    Overview:  Neurology consult Dr. Luther Armenta on 3-26-12. Impression essential tremor. MRI brain. Neurontin 100 mg bid started to help tremors and bilateral lower extremity paresthesia thought due to impaired FG or prediabetes. Normal EMG of lower extremity on 4-11-12 but compound motor action potentials low which can be seen in early axonal neuropathy. Developed swelling with gabapentin (Neurontin) . MR SPINE LUMBAR WITHOUT 8-17-12: L5-S1 marked charley. facet arthrosis causing degenerative grade I L5 anterolisthesis but causing only mild up-down foraminal stenosis and no compression of adjacent nerves. Tapering disc degeneration. No fracture, neoplasm or infection. US ANKLE BRACHIAL INDEX  8-17-12: mild athrosclerotic disease both lower extremities. Comprehensive lab tests ordered 9-17-12 by Dr. Armenta regarding neuropathy. Rx changed to nortriptyline which was increased to 50 mg daily at bedtime on 9-17-12. Impression was likely axonal neuropathy due to prediabetes.     Vitamin D insufficiency 12/3/2012    Overview:   Vitamin D was low at 28.1 on 12-3-12. Take vitamin D 3000 units daily for 8 weeks, then take vitamin D 2000 units indefinitely. Vitamin D was normal at 40.8 on 3-6-13. Continue vitamin D 2000 units indefinitely.     Vitreous degeneration 10/22/2018    Overview:  Right eye       Past Surgical History:   Procedure Laterality Date     APPENDECTOMY       BLEPHAROPLASTY Bilateral 11/07/2013     BREAST LUMPECTOMY  11/21/2006    Infiltrating ductal carcinoma, micropapillary variant, Janey     FINGER GANGLION CYST EXCISION Right 06/23/2016    right ring finger     MASTECTOMY MODIFIED RADICAL Left 12/06/2006     OH REMOVE EYELID LESN (NOT CHALAZION) Right 11/07/2013     RIGHT SIMPLE MASTECTOMY WITH RIGHT SENTINAL LYMPH NODE [id number] AND RIGHT AXILLARY NODE DISSECTION Right 06/01/2018     US BIOPSY BREAST NEEDLE W BIBIANA W GUIDE RIGHT Right 05/18/2018         Current Outpatient Medications:      calcium carbonate (OS-PENNY) 500 mg calcium (1,250 mg) chewable tablet, Chew 500 mg 3 (three) times a day., Disp: , Rfl:      cholecalciferol, vitamin D3, (VITAMIN D3) 400 unit cap, Take 2,000 Units by mouth daily., Disp: , Rfl:      cyanocobalamin 1000 MCG tablet, Take 1,000 mcg by mouth daily.    , Disp: , Rfl:      diphenhydrAMINE (BENADRYL) 25 mg tablet, Take 12.5 mg by mouth at bedtime as needed., Disp: , Rfl:      escitalopram oxalate (LEXAPRO) 10 MG tablet, Take 10 mg by mouth daily., Disp: , Rfl:      acetaminophen (TYLENOL) 500 MG tablet, Take 1,000 mg by mouth every 6 (six) hours as needed., Disp: , Rfl:     Current Facility-Administered Medications:      heparin 100 unit/mL lockflush (PF) porcine 300-600 Units, 300-600 Units, Intravenous, PRN, Janine Barrera MD, 600 Units at 10/31/18 1200    Allergies   Allergen Reactions     Gabapentin Swelling     Latex Other (See Comments)     Sulfa (Sulfonamide Antibiotics) Other (See Comments)     Tetracycline Other (See Comments)       Social History     Socioeconomic  History     Marital status:      Spouse name: Not on file     Number of children: Not on file     Years of education: Not on file     Highest education level: Not on file   Occupational History     Not on file   Social Needs     Financial resource strain: Not on file     Food insecurity:     Worry: Not on file     Inability: Not on file     Transportation needs:     Medical: Not on file     Non-medical: Not on file   Tobacco Use     Smoking status: Former Smoker     Packs/day: 0.50     Years: 40.00     Pack years: 20.00     Types: Cigarettes     Last attempt to quit: 2005     Years since quittin.3     Smokeless tobacco: Never Used   Substance and Sexual Activity     Alcohol use: No     Comment: occassionaly      Drug use: No     Sexual activity: Not on file   Lifestyle     Physical activity:     Days per week: Not on file     Minutes per session: Not on file     Stress: Not on file   Relationships     Social connections:     Talks on phone: Not on file     Gets together: Not on file     Attends [employer] service: Not on file     Active member of club or organization: Not on file     Attends meetings of clubs or organizations: Not on file     Relationship status: Not on file     Intimate partner violence:     Fear of current or ex partner: Not on file     Emotionally abused: Not on file     Physically abused: Not on file     Forced sexual activity: Not on file   Other Topics Concern     Not on file   Social History Narrative     Not on file       Family History   Problem Relation Age of Onset     Diabetes Brother      Hypertension Brother      Prostate cancer Father      Kidney disease Father         One kidney does not function     Seizures Father      Cancer Maternal Grandmother         bladder cancer     Arthritis Maternal Grandmother      Hypertension Maternal Grandmother      Osteoporosis Maternal Grandmother      Alcohol abuse Mother      Drug abuse Mother      Ulcers Mother      Hypertension  Mother      Diabetes Brother      Hypertension Brother      Allergic rhinitis Sister      Asthma Sister      Breast cancer Maternal cousin         DCIS       Review of Systems:  Review of systems is otherwise negative, except as noted above.  Full 12 point review of systems was completed.    Imaging:  I personally reviewed the following imaging today and those on care everywhere, if indicated  Interface, Cesiae - 04/17/2019 10:42 AM CDT  EXAM: CT CHEST WO  LOCATION: [facility]  DATE/TIME: 4/17/2019 10:10 AM    INDICATION: Abscess Of Right Lung With Pneumonia, Unspecified Part Of Lung (hc)  COMPARISON: 1.21.19, 1.7.19  TECHNIQUE: Helical images were obtained through the chest. Multiplanar reformats  were obtained. Dose reduction techniques were used.  IV CONTRAST: None.    FINDINGS:   LUNGS AND PLEURA: Small area of linear scarring and atelectasis within the right  middle lobe slightly improved when compared to the prior examination, likely a  sequela of previous infection. Very subtle subpleural interstitial lung changes  along the anterior right upper lobe. Stable 4 mm since 04/30/2018 nodule in the  right lower lobe.  No new nodules identified.    MEDIASTINUM: Enlarged left thyroid gland, similar to prior examination. Heart  normal in size. Mild coronary artery calcification. No mediastinal, axillary, or  hilar adenopathy.    LIMITED UPPER ABDOMEN: Small stone noted within the gallbladder. Left parapelvic  cysts. Stable nodule within the left adrenal gland.    MUSCULOSKELETAL: Degenerative changes of the spine.    CONCLUSION:   1.  Near complete resolution of the consolidative process within the right  middle lobe. There is small area of linear scarring, likely a sequela of the  previous infection.     Labs:  I personally reviewed the following labs today and those on care everywhere, if indicated    No results found for: SEDRATE      No results found for: CRP        Lab Results   Component Value Date     CREATININE 0.69 10/31/2018      1/21/19 Glu 117  Cr 0.78  BUN 13  Alb 3.6  1/24/19 Hgb 11.3  5/2/2019 glucose 99 creatinine 0.85 BUN 17 albumin 4.3 ALT 22 AST 18 alk phos 72 vitamin D 54.1    Physical Exam:  Vitals:    06/12/19 1035   BP: 110/70   Pulse: 76   Resp: 16   Temp: 98.3  F (36.8  C)     BMI 27.62 (stable)  Weight 151 pounds      Circumferential measures:    Vasc Edema 11/12/2018 2/18/2019 6/12/2019   Right-just above MCP 18.8 19.5 19.1   Right Wrist 16.5 16 16.2   Right Up 10cm 23 23.5 23.7   Right Up 10cm From Elbow 33.4 32.2 33.7   Left-just above MCP 18.5 19.8 19.2   Left Wrist 16.2 15.8 15.5   Left Up 10cm 22.8 22.8 22.3   Left Up 10cm From Elbow 34.5 33 32.7     Circumferential measures stable    General:  78 y.o. female in no apparent distress.      Psych: Alert and oriented x 3.  Cooperative. Affect normal.    HEENT: Atraumatic, normocephalic.     Range of Motion:  Range of motion of shoulders, elbows, wrists is normal bilaterally without active joint synovitis, erythema, joint swelling, crepitus or joint laxity except for tightness in the anterior axilla on the right shoulder at end-stage forward flexion and abduction. This is stable.      Sensation: Intact to pinprick and light touch throughout the upper extremities bilaterally.      Strength:  Normal strength testing in shoulder abduction, elbow flexion, elbow extension, wrist extension, forearm supination, forearm pronation, hand intrinsics, internal rotation and external rotation of the shoulder shoulders bilaterally.      Lymph nodes: No cervical, supraclavicular, infraclavicular, or axillary lymphadenopathy palpated.    Vascular: No unusual venous distention.  Radial arterial pulses strong and equal at the wrists bilaterally.     Skin: No unusual rubor, calor, masses or rashes along the skin in either arm.  No significant fibrosity or scarring.  No pain to palpation.     Preeti Jaramillo MD, ABWMS, FACCWS, [facility]  Medical Director Wound  Care and Lymphedema  [facility]  [phone]

## 2021-05-30 NOTE — TELEPHONE ENCOUNTER
Called Mikala to let her know the order for post masectomy bras and breast prosthesis she requested was ready to be picked up, Mikala requested it be mailed to her instead.

## 2021-06-02 VITALS — WEIGHT: 156.6 LBS | BODY MASS INDEX: 28.64 KG/M2

## 2021-06-02 VITALS — WEIGHT: 152.2 LBS | BODY MASS INDEX: 27.84 KG/M2

## 2021-06-02 VITALS — BODY MASS INDEX: 28.53 KG/M2 | WEIGHT: 156 LBS

## 2021-06-02 VITALS — WEIGHT: 158 LBS | BODY MASS INDEX: 28.9 KG/M2

## 2021-06-02 VITALS — BODY MASS INDEX: 27.82 KG/M2 | HEIGHT: 62 IN | WEIGHT: 151.2 LBS

## 2021-06-02 VITALS — BODY MASS INDEX: 29.3 KG/M2 | WEIGHT: 160.2 LBS

## 2021-06-02 VITALS — BODY MASS INDEX: 28.9 KG/M2 | WEIGHT: 158 LBS

## 2021-06-02 VITALS — BODY MASS INDEX: 27.62 KG/M2 | WEIGHT: 151 LBS

## 2021-06-02 VITALS — WEIGHT: 153.3 LBS | BODY MASS INDEX: 28.04 KG/M2

## 2021-06-02 VITALS — BODY MASS INDEX: 28.62 KG/M2 | WEIGHT: 156.5 LBS

## 2021-06-02 VITALS — WEIGHT: 151.7 LBS | BODY MASS INDEX: 27.75 KG/M2

## 2021-06-02 VITALS — BODY MASS INDEX: 28.54 KG/M2 | WEIGHT: 155.1 LBS | HEIGHT: 62 IN

## 2021-06-02 VITALS — WEIGHT: 151.3 LBS | BODY MASS INDEX: 27.67 KG/M2

## 2021-06-03 VITALS — HEIGHT: 62 IN | BODY MASS INDEX: 27.79 KG/M2 | WEIGHT: 151 LBS

## 2021-06-03 VITALS — WEIGHT: 154.2 LBS | BODY MASS INDEX: 28.2 KG/M2

## 2021-06-08 NOTE — PROGRESS NOTES
"Jody Ch is a 79 y.o. female who is being evaluated via a billable video visit.      The patient has been notified of following:     \"This video visit will be conducted via a call between you and your physician/provider. We have found that certain health care needs can be provided without the need for an in-person physical exam.  This service lets us provide the care you need with a video conversation.  If a prescription is necessary we can send it directly to your pharmacy.  If lab work is needed we can place an order for that and you can then stop by our lab to have the test done at a later time.    Video visits are billed at different rates depending on your insurance coverage. Please reach out to your insurance provider with any questions.    If during the course of the call the physician/provider feels a video visit is not appropriate, you will not be charged for this service.\"    Patient has given verbal consent to a Video visit? Yes    Will anyone else be joining your video visit? No        Additional provider notes: in chart    Video-Visit Details    Type of service:  Video Visit    Originating Location (pt. Location): Home    Distant Location (provider location):  [facility]      Platform used for Video Visit: Rochelle    Patient reports:Pt  Continues to use compression sleeve as neeeded. She denied any discomfort or new concern. Pt states that swollen has improved .    Change in status of the wound:  NA    Change of drainage- color, amount, odor:  NA    Change of swelling:  swollen has improved in right arm    Change in Pain status:  no    New wounds developed:  NA    Dressing Supplies Sufficient:  NA    Reviewed with patient that I will contact them with scheduling a follow up appointment, supply needs and any further teaching that is identified by the provider during the call. I will also send out an AVS with all education, a wound measuring tape and their next scheduled " visit. I will include instructions to assist with installing the application on their mobile device if appropriate for future video visits.

## 2021-06-08 NOTE — PATIENT INSTRUCTIONS - HE
Continue compression.    Encourage to increase exercises and range of motion program.      Patient will follow up in 1 year or when needed.

## 2021-06-08 NOTE — PROGRESS NOTES
Type of service:  Video Visit       Video Start and End Time : 8: 13-8:36 am    Originating Location (pt. Location):  Home      Distant Location (provider location):  [facility] [place]         Mode of Communication:  Ken    Date Of Service: 06/18/2020    Date last Seen:  06/12/19    PCP: Helga Olmedo MD    Impression:  1. Bilateral post mastectomy lymphedema-stable  2. 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 Questions were answered.   2. Encouraged to increase her exercises and range of motion program again.  She knows how to wear her compression appropriately.    3. Patient will follow up in 1 year or when needed.    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  This is done as a video visit due to the COVID-19 pandemic.   In May 2018 she was diagnosed with grade 1 ER VA negative breast carcinoma treated with right mastectomy with lymph node dissection done with final pathology being a 2.5 cm grade 2 here to negative ER VA negative breast carcinoma followed by chemotherapy and radiation.  She was sent to therapy which was beneficial for shoulder contractures and was to continue compression as needed and doing her exercises regularly.  She is here for her follow-up.  She states she has been doing very well.  She was recently diagnosed with emphysema.  She is now started on some inhalers.  She saw   Colon her oncologist 2 weeks ago and everything was fine. There is no new numbness, tingling or weakness.  There have been no new masses, rashes, or swellings of any other joints. There have been no infections.  There has been no new unexplained weight loss, loss of appetite, nausea and/or vomiting, shortness of breath, weight loss and chest pain.       Past Medical History:   Diagnosis Date     Anxiety state 1/31/2017     Atrophic vaginitis 5/8/2014    Overview:  UPDATE: Followed-up with Dr. Cassie Arteaga on 5-8-14. Noted that patient did not want to pay for the estrogen cream. Impression female stress incontinence and atrophic vaginitis.     Bilateral leg paresthesia 5/13/2014    Overview:  US FRANCISCO W EXERCISE BILATERAL 5-14-14: IMPRESSION: RIGHT LOWER EXTREMITY: FRANCISCO at rest is normal with a normal response to exercise. These findings would not be consistent with symptoms of arterial claudication. LEFT LOWER EXTREMITY: FRANCISCO at rest is normal with a normal response to exercise. These findings would not be consistent with symptoms of arterial claudication. US VENOUS LOWER EXTREMITY LEFT 5-14-14: Left leg veins are negative for DVT. EMG 5-19-14: Borderline abnormal EMG due to low amplitude compound motor action potential in the motor nerves with normal conduction velocities. This could suggest early axonal polyneuropathy. Such changes can be seen in patient's with prediabetes.     Bone pain 7/24/2018     Breast cancer (H)      Breast cancer, stage 2, right (H) 5/16/2018    Overview:  Added automatically from request for surgery [id number]     Chronic cough 2/12/2018    Overview:  XR CHEST 2 VIEWS PA AND LATERAL 12-4-17: Normal heart size and pulmonary vascularity. Tiny granulomas with some fibrosis in the right lung base. The left lung is clear. Slight deviation of the upper trachea from left to right presumably due to thyroid enlargement stable. No change from previous. No acute process is identified. No focal pulmonary  infiltrates.     Chronic diarrhea 5/12/2017    Overview:  UPDATE: COLONOSCOPY DIAGNOSTIC 7-25-17: Aphthous ulcer of ileum. Diverticulosis of colon without diverticulitis. Pathology Results: NEOTERMINAL ILEUM,  BIOPSY: Nonspecific chronic active ileitis. No dysplasia or malignancy. COLON, RANDOM, BIOPSY: Normal colonic mucosa STOOL TESTS on 5-12-17 for culture, Clostridium dificile toxin, WBC, Giardia antigen, Campylobacter, Shiga toxin, Stool for ova and parasite were negative       Chronic pain of right knee 10/23/2017    Overview:  XR KNEE 3 VIEWS RIGHT 10-23-17: Negative knee. No fracture or dislocation. No joint effusion.     Chronic rhinitis 6/8/2017     Chronic right hip pain 10/23/2017    Overview:  XR HIP 2 OR 3 VIEWS W PELVIS RIGHT 10-23-17: Arthritic change right hip. Pelvis otherwise negative.     Decreased range of motion of right shoulder 10/8/2018     Diarrhea 7/24/2018     Difficult or painful urination 12/8/2009    Overview:  UPDATE: Followed-up with Dr. Cassie Arteaga on 5-8-14. Noted that patient did not want to pay for the estrogen cream. Impression female stress incontinence and atrophic vaginitis. Exacerbation of her dysuria symptoms. She has not used her estrace vaginal cream due to cost noted 9-10-13. Urinalysis negative on 9-10-13. Urinalysis and urine microscopy showed some signs of urinary tract infection 9-12-13 . Prescription for ciprofloxacin 250 mg twice daily for 7 days sent to pharmacy. Followed-up with Dr. Bayron Seymour 11-5-13. Rx ciprofloxacin 500 mg twice daily (#30 tablets) so she can self-treat as needed for signs of UTI. Estrace not covered by insurance and so not refilled. Consider Premarin cream.  Dysuria 12-8-09 with negative UA and urine culture. Dysuria 9-28-10 with negative UA and urine microscopy. Advised urology consult 12-8-09 and 9-28-10 if problem recurrent. Advised Pyridium prn on 9-28-10.Previous biofeedback treatment for urge incontinence, urinary frequency and  dysuria. Followed-up M     Drug-induced nausea and vomiting 7/17/2018     Dry mouth 5/24/2016     Gallbladder polyp 4/14/2012    Overview:  UPDATE:US ABDOMEN LIMITED RUQ 4-18-16: 5 mm stone versus polyp in the gallbladder, decreased in size since comparison study where measured 7 mm. The gallbladder is contracted despite being NPO, which limits evaluation. Benign parapelvic cyst in the lower pole of the right kidney measuring 2.8 cm, is unchanged. US ABDOMEN LIMITED RUQ on 2-3-14:  Stable appearance of a 8 mm cholesterol polyp within the medial wall of the gallbladder. Incidentally noted is a small 7 mm hyperechoic focus within the right hepatic lobe. This likely reflects an incidental small cavernous hemangioma. US ABDOMEN LIMITED 12-3-12: Stable appearance of a 7 mm cholesterol polyp within the gallbladder.  CT ABD/PELVIS W/WO IV CONTRAST 3-23-12: Bilateral renal parapelvic cysts. Urinary bladder appears normal. Atrophic uterus and right adnexa. Either bilobed cyst or 2 separate cysts on atrophic left adnexa. Nodular foci in gallbladder suggestive of stones or polyps. Recommend gallbladder sonogram. US ABDOMEN LIMITED 4-6-12: Mul     Ganglion cyst of flexor tendon sheath of finger of right hand 5/24/2016     History of breast cancer 11/21/2006    Overview:  UPDATE: XR MAMMO UNI SCREEN FFDM RIGHT 4-14-14: ACR 1 Negative. Followed-up with oncologist Dr. Phyllis Colon on 4-29-14. Stable.    XR MAMMO UNI SCREEN FFDM RIGHT: 4-4-12, 4-5-13: ACR 2 Benign Finding. Followed-up with Dr. Phyllis Colon 5-23-13. CBC and CMP normal except for low glucose of 55 . CA 27-29 normal.   Followed-up with Dr. Phyllis Colon 11-16-12. CBC and CMP normal except for low glucose of 67 . CA 27-29 normal. stage I infiltrating ductal carcinoma, s/p left radical mastectomy, T1c N0 M0, ER/HI (+), on Arimedex started 2-2007 and needed for 5 years (until 2-2012). Oncologist AD[facility] (Dr. oLuise Burns). Follow-up oncologist 11-9-11.  Discontinue Arimidex in  per patient request due to muscle ache. Discontinue Fosamax once current prescription is done as bone density should improve once off Arimidex. .     Hypercholesteremia 12/8/2014    Overview:  UPDATE: Rx atorvastatin (Lipitor) 1-22-15. She sent medical message on 3-18-15 requesting to take atorvastatin (Lipitor) 20 mg tablet every other day. This would not work for atorvastatin (Lipitor) . I advised to take half tablet (10 mg) daily rather than taking one tablet every other day. ASCVD Risk  10 year risk 7.9 % calculated on 12/8/2014.  Recommendation moderate to high - intensity statin.      IC (interstitial cystitis) 12/2/2010    Overview:  UPDATE: UPDATE: Followed-up with Dr. Cassie Arteaga on 5-8-14. Noted that patient did not want to pay for the estrogen cream. Impression female stress incontinence and atrophic vaginitis. Exacerbation of her dysuria symptoms. She has not used her estrace vaginal cream due to cost noted 9-10-13. Urinalysis negative on 9-10-13. Urinalysis and urine microscopy showed some signs of urinary tract infection 9-12-13 . Prescription for ciprofloxacin 250 mg twice daily for 7 days sent to pharmacy. Followed-up with Dr. Bayron Seymour 11-5-13. Rx ciprofloxacin 500 mg twice daily (#30 tablets) so she can self-treat as needed for signs of UTI. Estrace not covered by insurance and so not refilled. Consider Premarin cream.  Previous biofeedback treatment for urge incontinence, urinary frequency and dysuria. Followed-up [facility] since 12-2-10 for interstitial cystitis. Work-up by urologist, Dr. Cassie Arteaga, since 2-27-12 regarding urinary urge incontinence and chronic interstitial cystitis. Rx estrace vaginal c     Ileitis 7/25/2017    Overview:  COLONOSCOPY DIAGNOSTIC 7-25-17: Aphthous ulcer of ileum. Diverticulosis of colon without diverticulitis. Pathology Results: NEOTERMINAL ILEUM,  BIOPSY: Nonspecific chronic active ileitis. No dysplasia or  malignancy. COLON, RANDOM, BIOPSY: Normal colonic mucosa . Advised to follow-up with [facility] regarding chronic active ileitis.     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     Left thyroid nodule 4/30/2018     Lymphedema of extremity 10/8/2018     Major depression, recurrent, full remission (H) 1/31/2017    Overview:  Inpatient treatment for depression after divorce in 1983. Major depression diagnosed due to financial worries diagnosed 11-23-10 by oncologist, Louise Burns. Citalopram 20 mg daily started. Citalopram discontinued on 5-19-11 per patient request.     Malignant neoplasm of upper-inner quadrant of right breast in female, 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) 10/8/2018     Mixed stress and urge urinary incontinence 7/24/2006    Overview:  UPDATE: Followed-up with Dr. Cassie Arteaga on 5-8-14. Noted that patient did not want to pay for the estrogen cream. Impression female stress incontinence and atrophic vaginitis.  Exacerbation of her dysuria symptoms. She has not used her estrace vaginal cream due to cost noted 9-10-13. Urinalysis negative on 9-10-13. Urinalysis and urine microscopy showed some signs of urinary tract infection 9-12-13 . Prescription for ciprofloxacin 250 mg twice daily for 7 days sent to pharmacy. Followed-up with Dr. Bayron Seymour 11-5-13. Rx ciprofloxacin 500 mg twice daily (#30 tablets) so she can self-treat as needed for signs of UTI. Estrace not covered by insurance and so not refilled. Consider Premarin cream. Previous biofeedback treatment for urge incontinence, urinary frequency and dysuria. Followed-up [facility] since 12-2-10 for interstitial cystitis. Work-up by urologist, Dr. Cassie Arteaga, since 2-27-12 regarding urinary urge incontinence and chronic interstitial cystitis. Rx estrace vaginal cream wit     Nasal  congestion 6/5/2014     Osteopenia 11/1/2006    Overview:  UPDATE:  XR DXA BONE DENSITY 2 SITES AXIAL 5/16/2018: Osteopenia. Lumbar Spine L1-L4 T-score -1.8 . Mean Bilateral Femoral Neck T-score -1.4 . FRAX Results: 10-year probability of major osteoporotic fracture is 10.8%, and of hip fracture is 2%, based on left femoral neck BMD. Basic bone health recommended.  XR DXA BONE DENSITY 2 SITES AXIAL 4-18-16: T score AP spine -2.1, Left femoral neck -1.1, R femoral neck -1.5. FRAX major osteoporotic fracture 11% and FRAX hip fracture score 2.0 %. Basic bone health recommended.  DEXA scan 4-14-14 T score -2.1 AP spine, -1.4 left femoral neck and -1.5 on right femoral neck. FRAX score not calculated. Bone density test stable. Recheck 4-2016. DEXA scan 4-4-12 T score -2.1 AP spine, -1.3 left femoral neck and -1.2 on right femoral neck. FRAX major osteoporotic score 9.6% and FRXA hip fracture score 1.3%. Recheck 4-2014 DEXA scan 3-20-08 T score -2.2 spine, -1.0 left femoral neck, -1.2 right femoral neck. DEXA 3-2-10: T score -1.7 AP spine, -1.0 L femoral ne     Other emphysema (H) 4/30/2018     Other specified disorders of nose and nasal sinuses 7/24/2018     Peripheral axonal neuropathy 11/22/2011    Overview:  UPDATE:  She thinks gabapentin (Neurontin) has been helpful without side effect of leg swelling discussed on 11-20-14. She agreed to increase dose to 300 mg at bedtime. She called on 10-30-14 requesting for gabapentin (Neurontin) as nortriptyline not helpful. Reminded her that she complained of leg swelling with gabapentin (Neurontin) in the past.  EMG 5-19-14: Borderline abnormal EMG due to low amplitude compound motor action potential in the motor nerves with normal conduction velocities. This could suggest early axonal polyneuropathy. Such changes can be seen in patient's with prediabetes. Neurology follow-up on 4-2-13. Advised to give nortriptyline 25 mg daily another try. If unable to tolerate, consider  duloxetine (Cymbalta) . Neurology consult Dr. Luther Armenta on 3-26-12. Impression essential tremor. MRI brain. Neurontin 100 mg bid started to help tremors and bilateral lower extremity paresthesia thought due to impaired FG or prediabetes. Normal EMG of lower extremity on 4-11-12 but comp     Personal history of tobacco use, presenting hazards to health 10/22/2018    Overview:  20 pack years     Pharyngeal dysphagia 12/8/2009     Recurrent UTI 9/12/2013    Overview:  UPDATE: Followed-up with Dr. Cassie Arteaga on 5-8-14. Noted that patient did not want to pay for the estrogen cream. Impression female stress incontinence and atrophic vaginitis.  Exacerbation of her dysuria symptoms. She has not used her estrace vaginal cream due to cost noted 9-10-13. Urinalysis negative on 9-10-13. Urinalysis and urine microscopy showed some signs of urinary tract infection 9-12-13 . Prescription for ciprofloxacin 250 mg twice daily for 7 days sent to pharmacy. Followed-up with Dr. Bayron Seymour 11-5-13. Rx ciprofloxacin 500 mg twice daily (#30 tablets) so she can self-treat as needed for signs of UTI. Estrace not covered by insurance and so not refilled. Consider Premarin cream.  Recheck Urinalysis and urine microscopy showed some signs of urinary tract infection 9-12-13 . Prescription for ciprofloxacin 250 mg twice daily for 7 days sent to pharmacy.     Salivation excessive 12/17/2008    Overview:  may be due to postnasal drip causing excessive salivation as she tends to swallow the postnasal drainage.She is not candidate for tricyclic antidepressant like nortriptyline (can cause dry mouth) to lessen salivary secretions as this would excerbate her dry lips.     SOB (shortness of breath) 5/13/2014    Overview:  XR CHEST 2 VIEWS PA AND LATERAL 5-13-14: Impression: On the lateral view, a small patchy opacity is again visualized and has a few linear markings. This may be chronic, it is suggested on the prior exam slightly more prominent  but this is probably due to technique, could represent chronic areas of subsegmental volume loss or previous consolidation. It is not well visualized on the PA view. Overall heart size and pulmonary vascular are normal. No pleural abnormalities. Stable appearance of somewhat confluent markings in the right apex as well as superimposition with the right 1st costochondral junction. No additional areas of significant consolidation.     Tremor, essential 11/22/2011    Overview:  Neurology consult Dr. Luther Armenta on 3-26-12. Impression essential tremor. MRI brain. Neurontin 100 mg bid started to help tremors and bilateral lower extremity paresthesia thought due to impaired FG or prediabetes. Normal EMG of lower extremity on 4-11-12 but compound motor action potentials low which can be seen in early axonal neuropathy. Developed swelling with gabapentin (Neurontin) . MR SPINE LUMBAR WITHOUT 8-17-12: L5-S1 marked charley. facet arthrosis causing degenerative grade I L5 anterolisthesis but causing only mild up-down foraminal stenosis and no compression of adjacent nerves. Tapering disc degeneration. No fracture, neoplasm or infection. US ANKLE BRACHIAL INDEX  8-17-12: mild athrosclerotic disease both lower extremities. Comprehensive lab tests ordered 9-17-12 by Dr. Armenta regarding neuropathy. Rx changed to nortriptyline which was increased to 50 mg daily at bedtime on 9-17-12. Impression was likely axonal neuropathy due to prediabetes.     Vitamin D insufficiency 12/3/2012    Overview:  Vitamin D was low at 28.1 on 12-3-12. Take vitamin D 3000 units daily for 8 weeks, then take vitamin D 2000 units indefinitely. Vitamin D was normal at 40.8 on 3-6-13. Continue vitamin D 2000 units indefinitely.     Vitreous degeneration 10/22/2018    Overview:  Right eye       Past Surgical History:   Procedure Laterality Date     APPENDECTOMY       BLEPHAROPLASTY Bilateral 11/07/2013     BREAST LUMPECTOMY  11/21/2006    Infiltrating ductal  carcinoma, micropapillary variant, Janey     FINGER GANGLION CYST EXCISION Right 06/23/2016    right ring finger     MASTECTOMY MODIFIED RADICAL Left 12/06/2006     IA REMOVE EYELID LESN (NOT CHALAZION) Right 11/07/2013     RIGHT SIMPLE MASTECTOMY WITH RIGHT SENTINAL LYMPH NODE [id number] AND RIGHT AXILLARY NODE DISSECTION Right 06/01/2018     US BIOPSY BREAST NEEDLE W BIBIANA W GUIDE RIGHT Right 05/18/2018         Current Outpatient Medications:      acetaminophen (TYLENOL) 500 MG tablet, Take 1,000 mg by mouth every 6 (six) hours as needed., Disp: , Rfl:      albuterol (PROAIR HFA;PROVENTIL HFA;VENTOLIN HFA) 90 mcg/actuation inhaler, Inhale 2 puffs 4 (four) times a day as needed., Disp: , Rfl:      ANORO ELLIPTA 62.5-25 mcg/actuation inhaler, , Disp: , Rfl:      calcium carbonate (OS-PENNY) 500 mg calcium (1,250 mg) chewable tablet, Chew 500 mg 3 (three) times a day., Disp: , Rfl:      cholecalciferol, vitamin D3, (VITAMIN D3) 400 unit cap, Take 2,000 Units by mouth daily., Disp: , Rfl:      cyanocobalamin 1000 MCG tablet, Take 1,000 mcg by mouth daily.    , Disp: , Rfl:      diphenhydrAMINE (BENADRYL) 25 mg tablet, Take 12.5 mg by mouth at bedtime as needed., Disp: , Rfl:      escitalopram oxalate (LEXAPRO) 10 MG tablet, Take 10 mg by mouth daily., Disp: , Rfl:      famotidine (PEPCID) 20 MG tablet, Take 20 mg by mouth 2 (two) times a day., Disp: , Rfl:      omeprazole (PRILOSEC) 20 MG capsule, Take 20 mg by mouth daily before breakfast., Disp: , Rfl:     Current Facility-Administered Medications:      heparin 100 unit/mL lockflush (PF) porcine 300-600 Units, 300-600 Units, Intravenous, PRN, Janine Barrera MD, 600 Units at 10/31/18 1200    Allergies   Allergen Reactions     Gabapentin Swelling     Latex Other (See Comments)     Sulfa (Sulfonamide Antibiotics) Other (See Comments)     Tetracycline Other (See Comments)       Social History     Socioeconomic History     Marital status:      Spouse  name: Not on file     Number of children: Not on file     Years of education: Not on file     Highest education level: Not on file   Occupational History     Not on file   Social Needs     Financial resource strain: Not on file     Food insecurity     Worry: Not on file     Inability: Not on file     Transportation needs     Medical: Not on file     Non-medical: Not on file   Tobacco Use     Smoking status: Former Smoker     Packs/day: 0.50     Years: 40.00     Pack years: 20.00     Types: Cigarettes     Last attempt to quit: 1/30/2005     Years since quitting: 15.3     Smokeless tobacco: Never Used   Substance and Sexual Activity     Alcohol use: No     Comment: occassionaly      Drug use: No     Sexual activity: Not on file   Lifestyle     Physical activity     Days per week: Not on file     Minutes per session: Not on file     Stress: Not on file   Relationships     Social connections     Talks on phone: Not on file     Gets together: Not on file     Attends Sabianist service: Not on file     Active member of club or organization: Not on file     Attends meetings of clubs or organizations: Not on file     Relationship status: Not on file     Intimate partner violence     Fear of current or ex partner: Not on file     Emotionally abused: Not on file     Physically abused: Not on file     Forced sexual activity: Not on file   Other Topics Concern     Not on file   Social History Narrative     Not on file       Family History   Problem Relation Age of Onset     Diabetes Brother      Hypertension Brother      Prostate cancer Father      Kidney disease Father         One kidney does not function     Seizures Father      Cancer Maternal Grandmother         bladder cancer     Arthritis Maternal Grandmother      Hypertension Maternal Grandmother      Osteoporosis Maternal Grandmother      Alcohol abuse Mother      Drug abuse Mother      Ulcers Mother      Hypertension Mother      Diabetes Brother      Hypertension  Brother      Allergic rhinitis Sister      Asthma Sister      Breast cancer Maternal cousin         DCIS       Review of Systems:  Review of systems is otherwise negative, except as noted above.  Full 12 point review of systems was completed.    Imaging:  I personally reviewed the following imaging results today and those on care everywhere, if indicated    Interface, Cesiae - 03/09/2020  3:23 PM CDT  EXAM: CT CHEST WO  LOCATION: [facility]  DATE/TIME: 3/9/2020 12:06 PM    INDICATION: COPD. Pulmonary nodule. Follow-up.  COMPARISON: None.  TECHNIQUE: CT chest without IV contrast. Multiplanar reformats were obtained.  Dose reduction techniques were used.  CONTRAST: None.    FINDINGS:   LUNGS AND PLEURA: 4 mm nodule right lower lobe medially (series 2 image 206),  unchanged. Tiny benign calcified granulomas bilaterally again seen. Small amount  of linear atelectasis or fibrotic appearing scarring posterior right middle  lobe, mildly decreased. Lungs are otherwise clear. No new or worrisome pulmonary  mass. No effusion.    MEDIASTINUM/AXILLAE: No mediastinal or hilar adenopathy. Left Port-A-Cath tip in  the SVC. No pericardial effusion. Stable nodular enlargement left thyroid gland.    UPPER ABDOMEN: Cholelithiasis. Left parapelvic renal cysts again seen. No  further follow-up needed. Stable 1.3 cm left adrenal nodule likely an adenoma,  no further follow-up needed. Small hiatal hernia.    MUSCULOSKELETAL: Postoperative change in the breasts and right axilla. No  axillary adenopathy. No suspicious osseous lesion.    IMPRESSION:   1.  Stable 4 mm pulmonary nodule right lower lobe compared to 04/17/2019.  2.  There are tiny benign calcified granulomas bilaterally with a small amount  of linear atelectasis or scarring right middle lobe, decreased.  3.  No worrisome pulmonary mass or infiltrate.  4.  Stable nodular enlargement left thyroid gland.  5.  Cholelithiasis.  6.  Postoperative change in the breasts  and right axilla.    Leodan Price MD     3/10/2020  4:53 PM  Complete Pulmonary Function Tests.    Ordering Provider: Dee  Reason for Study: COPD    Date of study: /td     Study adequacy: The study is technically adequate.    IMPRESSION:   Moderate obstruction is present  There is no reversibility  The flow volume loop is compatible with obstruction  Lung volumes are normal  There is a mild reduction in diffusion.    Labs:  I personally reviewed the following labs today and those on care everywhere, if indicated    No results found for: SEDRATE      No results found for: CRP        Lab Results   Component Value Date    CREATININE 0.69 10/31/2018      1/21/19 Glu 117  Cr 0.78  BUN 13  Alb 3.6  1/24/19 Hgb 11.3  5/2/2019 glucose 99 creatinine 0.85 BUN 17 albumin 4.3 ALT 22 AST 18 alk phos 72 vitamin D 54.1    Nothing new to review    Physical Exam:  There were no vitals filed for this visit.  She reports she has not run a fever.  BMI 27.62 (stable)  Weight 151 pounds previously    Circumferential measures:    Vasc Edema 11/12/2018 2/18/2019 6/12/2019   Right-just above MCP 18.8 19.5 19.1   Right Wrist 16.5 16 16.2   Right Up 10cm 23 23.5 23.7   Right Up 10cm From Elbow 33.4 32.2 33.7   Left-just above MCP 18.5 19.8 19.2   Left Wrist 16.2 15.8 15.5   Left Up 10cm 22.8 22.8 22.3   Left Up 10cm From Elbow 34.5 33 32.7     Circumferential measures previously.    Exam below was done under my direction and demonstration during video.    General:  79 y.o. female in no apparent distress.      Psych: Alert and oriented x 3.  Cooperative. Affect normal.    HEENT: Atraumatic, normocephalic.     Range of Motion:  Range of motion of shoulders normal bilaterally.    Sensation: Intact to light touch throughout the upper extremities bilaterally.      Strength:  Normal strength in both arms per patient report.    Lymph nodes: No cervical, supraclavicular, infraclavicular, or axillary lymphadenopathy palpated.    Vascular:  No unusual venous distention.     Skin: No unusual rubor, calor, masses or rashes along the skin in either arm.   No pain to palpation.     Preeti Jaramillo MD, Diplomate ABWMS, FACCWS, FAAPMR  Medical Director Wound Care and Lymphedema  [facility]  [phone]

## 2021-06-16 PROBLEM — C50.211 MALIGNANT NEOPLASM OF UPPER-INNER QUADRANT OF RIGHT BREAST IN FEMALE, ESTROGEN RECEPTOR NEGATIVE (H): Status: ACTIVE | Noted: 2018-10-08

## 2021-06-16 PROBLEM — Z17.1 MALIGNANT NEOPLASM OF UPPER-INNER QUADRANT OF RIGHT BREAST IN FEMALE, ESTROGEN RECEPTOR NEGATIVE (H): Status: ACTIVE | Noted: 2018-10-08

## 2021-06-16 PROBLEM — R19.7 DIARRHEA: Status: ACTIVE | Noted: 2018-07-24

## 2021-06-16 PROBLEM — R13.11 ORAL PHASE DYSPHAGIA: Status: ACTIVE | Noted: 2019-01-11

## 2021-06-16 PROBLEM — F51.05 INSOMNIA SECONDARY TO DEPRESSION WITH ANXIETY: Status: ACTIVE | Noted: 2017-01-31

## 2021-06-16 PROBLEM — Z87.891 PERSONAL HISTORY OF TOBACCO USE, PRESENTING HAZARDS TO HEALTH: Status: ACTIVE | Noted: 2018-10-22

## 2021-06-16 PROBLEM — M24.511 CONTRACTURE OF RIGHT SHOULDER: Status: ACTIVE | Noted: 2018-11-12

## 2021-06-16 PROBLEM — E04.1 LEFT THYROID NODULE: Status: ACTIVE | Noted: 2018-04-30

## 2021-06-16 PROBLEM — Z98.42 S/P BILATERAL CATARACT EXTRACTION: Status: ACTIVE | Noted: 2019-12-12

## 2021-06-16 PROBLEM — F33.42 MAJOR DEPRESSION, RECURRENT, FULL REMISSION (H): Status: ACTIVE | Noted: 2017-01-31

## 2021-06-16 PROBLEM — R11.2 DRUG-INDUCED NAUSEA AND VOMITING: Status: ACTIVE | Noted: 2018-07-17

## 2021-06-16 PROBLEM — K52.9 CHRONIC DIARRHEA: Status: ACTIVE | Noted: 2017-05-12

## 2021-06-16 PROBLEM — I89.0 LYMPHEDEMA OF EXTREMITY: Status: ACTIVE | Noted: 2018-10-08

## 2021-06-16 PROBLEM — C50.911: Status: ACTIVE | Noted: 2018-05-16

## 2021-06-16 PROBLEM — J34.89 OTHER SPECIFIED DISORDERS OF NOSE AND NASAL SINUSES: Status: ACTIVE | Noted: 2018-07-24

## 2021-06-16 PROBLEM — H43.819 VITREOUS DEGENERATION: Status: ACTIVE | Noted: 2018-10-22

## 2021-06-16 PROBLEM — F41.1 GAD (GENERALIZED ANXIETY DISORDER): Status: ACTIVE | Noted: 2019-05-02

## 2021-06-16 PROBLEM — G89.29 CHRONIC PAIN OF RIGHT KNEE: Status: ACTIVE | Noted: 2017-10-23

## 2021-06-16 PROBLEM — M25.551 CHRONIC RIGHT HIP PAIN: Status: ACTIVE | Noted: 2017-10-23

## 2021-06-16 PROBLEM — F33.0 DEPRESSION, MAJOR, RECURRENT, MILD (H): Status: ACTIVE | Noted: 2017-01-31

## 2021-06-16 PROBLEM — K52.9 ILEITIS: Status: ACTIVE | Noted: 2017-07-25

## 2021-06-16 PROBLEM — I26.99 OTHER PULMONARY EMBOLISM WITHOUT ACUTE COR PULMONALE (H): Status: ACTIVE | Noted: 2019-01-22

## 2021-06-16 PROBLEM — M25.561 CHRONIC PAIN OF RIGHT KNEE: Status: ACTIVE | Noted: 2017-10-23

## 2021-06-16 PROBLEM — I97.2 POST-MASTECTOMY LYMPHEDEMA SYNDROME: Status: ACTIVE | Noted: 2018-11-12

## 2021-06-16 PROBLEM — J31.0 CHRONIC RHINITIS: Status: ACTIVE | Noted: 2017-06-08

## 2021-06-16 PROBLEM — Z98.41 S/P BILATERAL CATARACT EXTRACTION: Status: ACTIVE | Noted: 2019-12-12

## 2021-06-16 PROBLEM — J43.8 OTHER EMPHYSEMA (H): Status: ACTIVE | Noted: 2018-04-30

## 2021-06-16 PROBLEM — J69.0 RECURRENT ASPIRATION PNEUMONIA (H): Status: ACTIVE | Noted: 2019-01-30

## 2021-06-16 PROBLEM — M25.611 DECREASED RANGE OF MOTION OF RIGHT SHOULDER: Status: ACTIVE | Noted: 2018-10-08

## 2021-06-16 PROBLEM — G89.29 CHRONIC RIGHT HIP PAIN: Status: ACTIVE | Noted: 2017-10-23

## 2021-06-16 PROBLEM — T50.905A DRUG-INDUCED NAUSEA AND VOMITING: Status: ACTIVE | Noted: 2018-07-17

## 2021-06-16 PROBLEM — R05.3 CHRONIC COUGH: Status: ACTIVE | Noted: 2018-02-12

## 2021-06-16 PROBLEM — F41.1 ANXIETY STATE: Status: ACTIVE | Noted: 2017-01-31

## 2021-06-16 PROBLEM — M89.8X9 BONE PAIN: Status: ACTIVE | Noted: 2018-07-24

## 2021-06-16 PROBLEM — F41.8 INSOMNIA SECONDARY TO DEPRESSION WITH ANXIETY: Status: ACTIVE | Noted: 2017-01-31

## 2021-06-20 NOTE — PROGRESS NOTES
Pt ambulatory to radiation clinic for initial radiation consult, accompanied by . VSS, denies pain except after chemo infusion. RN spent 20 minutes with pt discussing RT, RT planning, and RT side effects. ACS RT, Krames RT, Breast RT, and breast exercise hand outs given with explanation. Further recommendations and orders per provider.

## 2021-06-20 NOTE — PROGRESS NOTES
I met with Mikala and her  today prior to her visit with Dr. Barrera.  I gave her my contact information and explaination of my role.  She said she is due to be done with her chemo on 10-19-18.  She was hoping to be able to take a little get away, somewhere close, prior to starting her radiation, following her chemo.  I gave her a handout for [employer] and encouraged her to reach out to the owner, Bianca.  I gave her a handout on HE CC classes and encouraged her to come to breast cancer support group.  Support and encouragement and reassurance provided, invited calls.

## 2021-06-20 NOTE — PROGRESS NOTES
[facility] Radiation Oncology Consult Note    Patient: Jody Ch  MRN: [id number]  Date of Service: 09/17/2018    Assessment / Impression     1. 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  ECOG Peformance Status  ECOG Performance Status: 1  Distress Assessment Score: 3    Plan:   I answered her questions regarding her breast cancer and the role mastectomy and chemotherapy have played so far. We discussed risks and benefits, in detail, of radiation therapy including side effects as described below. The patient voices understanding of the information discussed and wishes to proceed forward with treatment.     We will obtain CT sim 2 weeks after she completes chemotherapy to begin radiation planning. On physical exam the patient is very tight with cording present, I have provided her with referral to PT. She will wear her compression sleeve while undergoing radiation and I encouraged her to wear this more with chemotherapy. I have also put in a referral to see Dr. Jaramillo because she is concerned about having bilateral axillary resections.  All further questions and concerns addressed.     Face to face time  60 minutes with > 75% spent on consultation, education and coordination of care.  Intent of Therapy: Curative  Patient on concurrent Herceptin No  Adjuvant hormonal therapy: No  Chemotherapy: ddAC with Neulasta + Taxol prior to radiation  Intended fractionation schedule: Standard + boost    Breast cancer risk factors:   No obstetric history on file.  LMP Dates from Last 4 Encounters:   No data found for LMP       We recommend adjuvant radiation as part of her breast conserving therapy to decrease her chance of local recurrence to the single digits. ROM stretches and skin care were discussed with the patient. She understands that these maneuvers need to continue for 6 months following  completion of radiation as skin and muscle fibrosis continue to form for weeks to months following completion of therapy.      Side effects that may occur during or within weeks after radiation therapy      Fatigue and general weakness    Darkening, irritation, itchiness, redness, dryness, erythema, peeling, scabbing, ulceration and contraction of the skin of the breast and chest    Swelling of the breast    Loss of armpit hair    Lung irritation    Decrease in appetite    Side effects that may occur months or years after radiation therapy      Development of another tumor or cancer    Thickening, telangiectasias (development of spider like blood vessels in the skin) and ulceration of the skin of the breast and chest    Firming, fibrosis (scar tissue), fat necrosis, and distortion of the breast    Poor healing after a trauma or surgery in the irradiated area    Nerve damage resulting in loss of arm strength and sensation    Coronary artery blockage causing angina pain or a heart attack    Lung inflammation of fibrosis causing cough, fever and shortness of breath    Fracture of the ribs    Swellingof an arm and hand    The risks, benefits and alternatives to radiation therapy were outlined with the patient. All questions were answered and a consent was signed.    Subjective:      HPI: Jody Ch is a 77 y.o. female with right sided breast cancer, s/p right mastectomy and left mastectomy (2006).    The patient was originally diagnosed with left breast cancer in 2016, IDC grade II, DCIS, ER/MS pos, HER-2 neg. She subsequently underwent radical mastectomy and completed 5 years of Arimidex    Her most recent screening mammogram in May 2018 showed an abnormality in the upper quadrant of the right breast. She subsequently underwent US left breast biopsy which showed IDC grade I, ER/MS neg. She then had right mastectomy with SLN biopsy on 6/1/2018, final pathology returned with 2.5 cm IDC grade II, lymphovascular  invasion present, 4/13 LN pos with largest being 1.2 cm, HER-2 negative by FISH.     The patient has underwent PET CT without evidence of metastatic disease. A left thyroid nodule has been biopsied which was negative. CT chest showing stable lung nodules. She has completed 4 cycles ddAC + Neulasta support chemotherapy. She started Taxol on 9/04/2018 with her last day scheduled for 10/19/2018.    The patient presents today to discuss radiation therapy. She has no complaints at this time. She is tolerating chemotherapy well.  Has early lymphedema and has a sleeve already     Prior Radiation: No  Concurrent Chemotherapy: No    Current Outpatient Prescriptions   Medication Sig Dispense Refill     diphenhydrAMINE (BENADRYL) 25 mg tablet Take 12.5 mg by mouth at bedtime as needed.       escitalopram oxalate (LEXAPRO) 10 MG tablet Take 10 mg by mouth daily.       LORazepam (ATIVAN) 0.5 MG tablet Take 0.5 mg by mouth every 6 (six) hours as needed.       ondansetron (ZOFRAN) 8 MG tablet Take 8 mg by mouth every 8 (eight) hours as needed for nausea.       prochlorperazine (COMPAZINE) 10 MG tablet Take 10 mg by mouth every 6 (six) hours as needed.       acetaminophen (TYLENOL) 500 MG tablet Take 1,000 mg by mouth every 6 (six) hours as needed.       calcium carbonate (OS-PENNY) 500 mg calcium (1,250 mg) chewable tablet Chew 500 mg 3 (three) times a day.       cholecalciferol, vitamin D3, (VITAMIN D3) 400 unit cap Take 2,000 Units by mouth daily.       No current facility-administered medications for this visit.      Past Medical History:   Diagnosis Date     Breast cancer (H)      No past surgical history on file.  Gabapentin; Latex; Sulfa (sulfonamide antibiotics); and Tetracycline  No family history on file.  Social History     Social History     Marital status:      Spouse name: N/A     Number of children: N/A     Years of education: N/A     Occupational History     Not on file.     Social History Main Topics      Smoking status: Former Smoker     Smokeless tobacco: Not on file     Alcohol use No     Drug use: No     Sexual activity: Not on file     Other Topics Concern     Not on file     Social History Narrative     No narrative on file        Review of Systems:        General  General (WDL): Exceptions to WDL  Fatigue: Yes - Recent (Less than 3 months)  Fever: Yes, Recent (Less than 3 months)  Generalized Muscle Weakness: Yes - Recent (Less than 3 months)  Night Sweats: Yes - Recent (Greater than 3 months)  EENT  ENT (WDL): Exceptions to WDL  Glasses or Contacts: Yes - Chronic (Greater than 3 months)  Sinus Congestion/Drainage: Yes - Recent (Less than 3 months)  Respiratory       Respiratory (WDL): Exceptions to WDL  Dyspnea: Yes - Recent (Less than 3 months)  Cough: Yes - Recent (Less than 3 months)  Cardiovascular  Cardiovascular (WDL): All cardiovascular elements are within defined limits  Endocrine  Endocrine (WDL): All endocrine elements are within defined limits  Gastrointestinal  Gastrointestinal (WDL): Exceptions to WDL  Heartburn: Yes - Recent (Less than 3 months)  Nausea and Vomiting: Yes - Recent (Less than 3 months)  Diarrhea: Yes - Chronic (Greater than 3 months)  Musculoskeletal  Musculoskeletal (WDL): Exceptions to WDL  Joint pain: Yes - Recent (Less than 3 months)  Pain interfering with walking: Yes - Recent (Less than 3 months)  Muscle pain or stiffness: Yes - Recent (Less than 3 months)  Integumentary                  Neurological  Neurological (WDL): Exceptions to WDL  Difficulty walking: Yes - Recent (Less than 3 months)  Dominant Hand: Right  Psychological/Emotional   Psychological/Emotional (WDL): Exceptions to WDL  Daytime sleepiness: Yes - Recent (Less than 3 months)  Hematological/Lymphatic  Hematological/Lymphatic (WDL): All hematological/lymphatic elements are within defined limits  Dermatologic  Dermatologic (WDL): Exceptions to WDL  Hair Loss: Yes - Recent (Less than 3  months)  Genitourinary/Reproductive  Genitourinary/Reproductive (WDL): Exceptions to WDL  Urination more than 2 times a night: Yes - Recent (Less than 3 months)  Urinary Urgency: Yes - Recent (Less than 3 months)  Reproductive     Pain              Currently in Pain: Yes  Pain Score (Initial OR Reassessment): 7  Pain Frequency: Intermittent (after chemo)  Location: legs, feet  Pain Intervention(s): Medication (See MAR)  Response to Interventions: helpful, 2/10   AUA Assessment                    Accompanied by         Objective:     Physical Exam    Vitals:    09/17/18 1329   BP: 137/74   Pulse: (!) 102   Temp: 98.5  F (36.9  C)   TempSrc: Oral   SpO2: 94%   Weight: 156 lb 8 oz (71 kg)      Head: Normocephalic, without obvious abnormality, atraumatic  Neck: no adenopathy and supple, symmetrical, trachea midline  Lungs: clear to auscultation bilaterally  Chest wall:  expected post operative changes, no masses skin changes suggestive of recurrence or infection.   Heart: regular rate and rhythm  Skin: Skin color, texture, turgor normal. No rashes or lesions  Lymph nodes: Cervical, supraclavicular, and axillary nodes normal.  Extremities: Limited end range of RUE, cording present.   Neurologic: Grossly normal  Psych: affect normal, thought content appropriate.     Recent Labs: No results found for this or any previous visit (from the past 168 hour(s)).    Imaging: Imaging results 30 days: No results found.    Pathology:   No results found for this or any previous visit (from the past 8760 hour(s)).      I, Janine Barrera MD personally performed the services described in this documentation, as scribed by Harshad Agarwal in my presence, and it is both accurate and complete.    Signed by: Janine Barrera MD, MPH

## 2021-06-20 NOTE — PROGRESS NOTES
[facility] Daily Progress     Patient Name: Jody Ch  Date: 10/5/2018  Visit #: 2  PTA visit #: 1  Referral Diagnosis: [unfilled]  Referring provider: Janine Barrera*  Visit Diagnosis:     ICD-10-CM    1. Lymphedema I89.0    2. Muscle weakness (generalized) M62.81    3. Decreased range of motion of right shoulder M25.611    4. Scar condition and fibrosis of skin L90.5    5. Breast cancer (H) C50.919          Assessment:   Pt returns today for her first follow up appointment.   Pt has not been walking and sits most of her day.  Pt has not been wearing her compression sleeve.  Patient is benefitting from skilled physical therapy and is making steady progress toward functional goals.  Patient is appropriate to continue with skilled physical therapy intervention, as indicated by initial plan of care.    Goal Status:  Pt. will demonstrate/verbalize independence in self-management of condition in : 6 weeks  Pt. will show improved balance for safer : for community ambulation;for exercise/recreation;independently;in 6 weeks  Patient will reach / maintain arm movement: overhead;with less pain;with less difficulty;in 4 weeks;for other activity  for other activity: to allow for proper positioining for radiation  Patient will have decreased fibrosis, scar tissue for improved lymphatic mobilitiy : in 4 weeks  Pt will: decrease spadi  by 4 points to demonstrate improved function and decreased pain    Plan / Patient Education:     Continue with initial plan of care.  Progress with home program as tolerated.   Encouraged pt to walk daily.  Pt instructed to wear her compression garment as much as she can.    Subjective:   Pt reports she did her exercises. Pt has not done walking due to the neuropathy in her feet.   Pt has continued to nap every day. Pt states it will depend on how the night has gone. Pt states she spent most of the day in her chair yesterday.  Pt to start radiation the 1st of  November.  Pain Ratin        Objective:   Caregiver present: No     Observation of swelling: unchanged      Volume measurements taken:  no      Skin condition is:  no    Compression: no compression       Medication for infection:  No    R shoulder flexion ROM: 135 degrees    Treatment Today     TREATMENT MINUTES COMMENTS   Evaluation     Self-care/ Home management     Manual therapy 43 MFR to R pec major,   MLD to neck, trunk, abdominals routing to inguinals   Neuromuscular Re-education     Therapeutic Activity     Therapeutic Exercises 10 Reviewed HEP  Added to HEP:  Static balance:  -NBOS  -Tandem stand   Gait training     Modality__________________                Total 53    Blank areas are intentional and mean the treatment did not include these items.       Isidra Peters,ANA  10/5/2018

## 2021-06-20 NOTE — PROGRESS NOTES
[facility] Daily Progress     Patient Name: Jody Ch  Date: 10/11/2018  Visit #: 3  PTA visit #: 2  Referral Diagnosis: [unfilled]  Referring provider: Janine Barrera*  Visit Diagnosis:     ICD-10-CM    1. Lymphedema I89.0    2. Muscle weakness (generalized) M62.81    3. Decreased range of motion of right shoulder M25.611    4. Scar condition and fibrosis of skin L90.5    5. Breast cancer (H) C50.919          Assessment:     Pt demonstrates increased R shoulder ROM.  Pt has been compliant with her HEP and has been walking short distances.   Patient is benefitting from skilled physical therapy and is making steady progress toward functional goals.  Patient is appropriate to continue with skilled physical therapy intervention, as indicated by initial plan of care.    Goal Status:  Pt. will demonstrate/verbalize independence in self-management of condition in : 6 weeks  Pt. will show improved balance for safer : for community ambulation;for exercise/recreation;independently;in 6 weeks  Patient will reach / maintain arm movement: overhead;with less pain;with less difficulty;in 4 weeks;for other activity  for other activity: to allow for proper positioining for radiation  Patient will have decreased fibrosis, scar tissue for improved lymphatic mobilitiy : in 4 weeks  Pt will: decrease spadi  by 4 points to demonstrate improved function and decreased pain    Plan / Patient Education:     Continue with initial plan of care.  Progress with home program as tolerated.   Encouraged pt to walk daily.  Pt instructed to wear her compression garment as much as she can.    Subjective:   Pt reports compliancy with her HEP and has done some walking around her house.   Pt states she has been able to reach into the top shelf of a cupboard.  Pt has not been wearing her compression gardments.  Pt to start radiation the early November.  Pain Ratin        Objective:   Caregiver present: No      Observation of swelling: unchanged      Volume measurements taken:  no      Skin condition is:  no    Compression: no compression       Medication for infection:  No    R shoulder flexion ROM: 141 degrees    Treatment Today     TREATMENT MINUTES COMMENTS   Evaluation     Self-care/ Home management     Manual therapy 43 MFR to R pec major,   MLD to neck, trunk, abdominals routing to inguinals   Neuromuscular Re-education     Therapeutic Activity     Therapeutic Exercises 10 Reviewed HEP  Added to HEP:  Static balance:  -NBOS eyes closed  -Tandem stand eyes closed   -sit to stand   Gait training     Modality__________________                Total 53    Blank areas are intentional and mean the treatment did not include these items.       Isidra Peters,ANA  10/11/2018

## 2021-06-20 NOTE — PROGRESS NOTES
[facility]   Lymphedema Initial Evaluation        [facility] Certification Request    September 28, 2018      Patient: Jody Ch  MR Number: [id number]  YOB: 1940  Date of Visit: 9/28/2018      Dear Dr. Shima Barrera:    Thank you for this referral.   We are seeing Jody Ch for Physical Therapy of R UE axillary cording and decreased ROM and early lymphedema.    Medicare and/or Medicaid requires physician review and approval of the treatment plan. Please review the plan of care and verify that you agree with the therapy plan of care by co-signing this note.      Plan of Care  Authorization / Certification Start Date: 09/28/18  Authorization / Certification End Date: 12/27/18  Authorization / Certification Number of Visits: up to 10 visits  Communication with: Referral Source;Patient Caregiver  Patient Related Instruction: Nature of Condition;Treatment plan and rationale;Self Care instruction;Basis of treatment;Body mechanics;Posture;Precautions;Next steps;Expected outcome  Times per Week: 1-2x/week  Number of Weeks: 4 weeks  Number of Visits: up to 10  Discharge Planning: independent in home program  Precautions / Restrictions : chemotherapy  Therapeutic Exercise: ROM;Stretching;Strengthening;Lymphedema  Neuromuscular Reeducation: kinesio tape;posture;balance/proprioception  Manual Therapy: soft tissue mobilization;myofascial release;lymphatic drainage massage  Functional Training (ADL's): skin care;self care;lymphedema precautions;bandage/garment wear and care;ADL's    Goals:  Pt. will demonstrate/verbalize independence in self-management of condition in : 6 weeks  Pt. will show improved balance for safer : for community ambulation;for exercise/recreation;independently;in 6 weeks  Patient will reach / maintain arm movement: overhead;with less pain;with less difficulty;in 4 weeks;for other activity  for other activity: to allow for proper positioining for  radiation  Patient will have decreased fibrosis, scar tissue for improved lymphatic mobilitiy : in 4 weeks  Pt will: decrease spadi  by 4 points to demonstrate improved function and decreased pain      If you have any questions or concerns, please don't hesitate to call.    Sincerely,      Erika Lindsay, PT        Physician recommendation:     ___ Follow therapist's recommendation        ___ Modify therapy      *Physician co-signature indicates they certify the need for these services furnished within this plan and while under their care.      Patient Name: Jody Ch  Date of evaluation: 9/28/2018  Referral Diagnosis: Malignant neoplasm of upper-inner quadrant of right breast in female, 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)  Referring provider: Janine Barrera*  Visit Diagnosis:     ICD-10-CM    1. Lymphedema I89.0    2. Muscle weakness (generalized) M62.81    3. Decreased range of motion of right shoulder M25.611    4. Scar condition and fibrosis of skin L90.5    5. Breast cancer (H) C50.919        Assessment:      Pt. would be a good candidate for edema management and to develop a home management program.    Goals:   Pt. will demonstrate/verbalize independence in self-management of condition in : 6 weeks  Pt. will show improved balance for safer : for community ambulation;for exercise/recreation;independently;in 6 weeks  Patient will reach / maintain arm movement: overhead;with less pain;with less difficulty;in 4 weeks;for other activity  for other activity: to allow for proper positioining for radiation  Patient will have decreased fibrosis, scar tissue for improved lymphatic mobilitiy : in 4 weeks  Pt will: decrease spadi  by 4 points to demonstrate improved function and decreased pain    Patient's expectations/goals are realistic.    Barriers to Learning or Achieving Goals:  Co-morbidities or other medical factors.  currently receiving chemotherapy with plans for radiation to beging 11/1/2018  Financial  situation.  Age.     Lymphedema Category:  VI - Stage 2 with Mod-High Functional Demand       Plan / Patient Instructions:      Plan of Care:   Authorization / Certification Start Date: 09/28/18  Authorization / Certification End Date: 12/27/18  Authorization / Certification Number of Visits: up to 10 visits  Communication with: Referral Source;Patient Caregiver  Patient Related Instruction: Nature of Condition;Treatment plan and rationale;Self Care instruction;Basis of treatment;Body mechanics;Posture;Precautions;Next steps;Expected outcome  Times per Week: 1-2x/week  Number of Weeks: 4 weeks  Number of Visits: up to 10  Discharge Planning: independent in home program  Precautions / Restrictions : chemotherapy  Therapeutic Exercise: ROM;Stretching;Strengthening;Lymphedema  Neuromuscular Reeducation: kinesio tape;posture;balance/proprioception  Manual Therapy: soft tissue mobilization;myofascial release;lymphatic drainage massage  Functional Training (ADL's): skin care;self care;lymphedema precautions;bandage/garment wear and care;ADL's    Plan for next visit: initiate MFR/STM and stretching of R>L shoulder/upper quadrant to allow for proper positioinig for upcoming radiation R.  Progress with exercises (stretching, strengthening, balance and general conditioning to decrease cancer related fatigue and deconditioning).  MLD to address R>L UE lymphedema directing to inguinal LNs as patient with h/o L breast cancer and LND. Continue to educate in garments and lymphedema precautions.     Subjective:         Social information:   Living Situation:single family home with    Occupation:retired   Work Status:NA   Equipment Available: None and compression sleeve with attatched gauntlet; [employer] [place] standard class 1 size III just received for R    History of Present Illness:    Jody is a 78 y.o. female who presents to therapy today with complaints of decreased range of motion R UE, pain and lymphedema.  Patient  states she will be starting radiation 2018 and needs to be able to postion arm up overhead. Date of onset/duration of symptoms was 2018 after R mastectomy + LND and h/o L mastectomy  With LND (possibly full ALND) 2006. Onset was gradual. Symptoms are constant. She reports h/o rotator cuff problems on left previously and also neuropathies hands and feet with pain and P & N.    Pain Ratin R UE but increased pain with neuropahties  Pain description: pulling    Functional limitations are described as occurring with:   reaching overhead, but also reports significant fatigue and deconditioning requiring increased rest breaks    Patient reports benefit from:  physical therapy       Objective:      Patient Outcome Measures :    Lymphedema Life Impact Score (%): 6   Scores range from %, where a score of 20% represents the least impact on the individual's life (minimal physical, psychosocial and functional concerns).     Right Upper Extremity Total Estimated Volume (cm3): 2227.8  Left Upper Extremity Total Estimated Volume (cm3): 2189.79  Upper Extremity Swelling Comparison (%): 1.71 %     Spadi=5% R UE      Examination  1. Lymphedema     2. Muscle weakness (generalized)     3. Decreased range of motion of right shoulder     4. Scar condition and fibrosis of skin     5. Breast cancer (H)       Involved side: Right  Posture Observation:      General sitting posture is  fair.    Surgery: Mastectomy:  Right  Treatments: Chemotherapy  radiation to start 2018  Precautions/Restrictions: None  Involved area: Right Arm  Edema: Minimal and Stemmers sign  negative; mild rubbery edema throughout R UE and trunk, possible mild lymphedema L UE/UQ   Fibrosis: mild  Temperature: Normal  Sensation: decreased R axilla and around scar and neuropathy of ffingers and feet  Skin color: Normal  Skin texture: Normal  Scars: R mastectomy  Wounds: Absent  Gait: decreased pace  ROM: L shoulder flexion = 145; R=130,  "  Strength: R UE 4=/5        Treatment Today   9/28/2018  TREATMENT MINUTES COMMENTS   Evaluation 25    Self-care/ Home management 15 Further instructed patient in donning/doffing of compression garments and recommendation to wear daily to minimize edema refill on R and educated in radiation as a higher risk activity.  Initiated education on skin care to decrease risk of infection  Initiated education on fatigue management and to trial limiting naps to 1x before 2pm < 1hour.   Manual therapy     Neuromuscular Re-education     Therapeutic Activity     Therapeutic Exercises 15 Further instructed in Ball squeezes, scapular retraction, wall slides, supine shoulder assisted flexion, hands behind head adduction/abduction with shoulder stretches to be performed 2x/day and recommended progressive daily walking program.   Gait training     Modality__________________                Total 55    Blank areas are intentional and mean the treatment did not include these items.     PT Evaluation Code: (Please list factors)  Patient History/Comorbidities: increased age, pain, \"feeling overwhelmed\" at times; multiple medical appointments, fatigue  Examination: edema, fibrosis, decreased ROM/cording, decreased strength and deconditioning, decreased balance  Clinical Presentation: evolving  Clinical Decision Making: moderate complexity    Patient History/  Comorbidities Examination  (body structures and functions, activity limitations, and/or participation restrictions) Clinical Presentation Clinical Decision Making (Complexity)   No documented Comorbidities or personal factors 1-2 Elements Stable and/or uncomplicated Low   1-2 documented comorbidities or personal factor 3 Elements Evolving clinical presentation with changing characteristics Moderate   3-4 documented comorbidities or personal factors 4 or more Unstable and unpredictable High            Erika Lindsay  9/28/2018  8:30 AM               "

## 2021-06-21 NOTE — PROGRESS NOTES
"[facility] Daily Progress     Patient Name: Jody Ch  Date: 10/19/2018  Visit #: 4  PTA visit #: 3  Referral Diagnosis: [unfilled]  Referring provider: Janine Barrera*  Visit Diagnosis:     ICD-10-CM    1. Lymphedema I89.0    2. Muscle weakness (generalized) M62.81    3. Decreased range of motion of right shoulder M25.611    4. Scar condition and fibrosis of skin L90.5    5. Breast cancer (H) C50.919          Assessment:   Pt fatiqued today due to recent chemotherapy.  Pt demonstrates increased R shoulder ROM.  Pt has been compliant with her HEP and has been walking short distances.   Patient is benefitting from skilled physical therapy and is making steady progress toward functional goals.  Patient is appropriate to continue with skilled physical therapy intervention, as indicated by initial plan of care.    Goal Status:  Pt. will demonstrate/verbalize independence in self-management of condition in : 6 weeks  Pt. will show improved balance for safer : for community ambulation;for exercise/recreation;independently;in 6 weeks  Patient will reach / maintain arm movement: overhead;with less pain;with less difficulty;in 4 weeks;for other activity  for other activity: to allow for proper positioining for radiation  Patient will have decreased fibrosis, scar tissue for improved lymphatic mobilitiy : in 4 weeks  Pt will: decrease spadi  by 4 points to demonstrate improved function and decreased pain    Plan / Patient Education:     Continue with initial plan of care.  Progress with home program as tolerated.   Encouraged pt to walk daily.  Pt instructed to wear her compression garment as much as she can.    Subjective:     Pain Ratin  Pt had chemo on Tuesday. Pt states she is not feeling 100% \" I didn't do anything yesterday.\"  Pt states she did not sleep well last night.  Pt to start radiation in early November.      Objective:   Caregiver present: No     Observation of swelling: " decreased visibly and per pt     Volume measurements taken:  no      Skin condition is:  no    Compression: no compression       Medication for infection:  No    R shoulder flexion ROM: 146 degrees    Treatment Today     TREATMENT MINUTES COMMENTS   Evaluation     Self-care/ Home management     Manual therapy 50 MFR to R pec major,   MLD to neck, trunk, abdominals routing to inguinals   Neuromuscular Re-education     Therapeutic Activity     Therapeutic Exercises  Reviewed HEP  Added to HEP:  Static balance:  -NBOS eyes closed  -Tandem stand eyes closed   -sit to stand   Gait training     Modality__________________                Total 50    Blank areas are intentional and mean the treatment did not include these items.       Isidra Peters,CLT  10/19/2018

## 2021-06-21 NOTE — PROGRESS NOTES
RADIATION ONCOLOGY WEEKLY TREATMENT VISIT NOTE      Assessment / Impression       1. Malignant neoplasm of upper-inner quadrant of right breast in female, 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)       Cancer Staging  No matching staging information was found for the patient.   Tolerating radiation therapy well.  All questions and concerns addressed.  Doing well no c/o  [id number]   10/33 fx today    Plan:     Continue radiation treatment as prescribed.  Radiation: Site: Right CW  Stereotactic Radiosurgery: No  Stereotactic Radiosurgery: No  Concurrent Therapy: No  Today's Dose: 1800  Total Dose for Breast: 6040  Today's Fraction/Total Fraction Breast: 10/33    Per plan    Subjective:      HPI: Jody Ch is a 78 y.o. female with    1. 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    Assessment                  Body Site: Breast                           Site: Right CW  Stereotactic Radiosurgery: No  Concurrent Therapy: No  Today's Dose: 1800  Total Dose for Breast: 6040  Today's Fraction/Total Fraction Breast: 10/33  Drainage: 0: Absent                                            Sexuality Alteration                 Emotional Alteration Copin: Effective  Comfort Alteration KPS: 100 % Normal, no complaints  Fatigue (ONS scale) : 4: Moderate Fatigue  Pain Location: denies  Hot Flashes and/or Flushes: 1: Mild or no more than 1 per day   Nutrition Alteration Anorexia: 0: None  Nausea: 0: None  Vomitin: None  Skin Alteration Skin Sensation: 0: No problem  Skin Reaction: 0: None  AUA Assessment                                  Accompanied by       Objective:     Exam:     Vitals:    18 1007   BP: 136/78   Pulse:  94   Temp: 98.8  F (37.1  C)   TempSrc: Oral   SpO2: 96%   Weight: 152 lb 3.2 oz (69 kg)       Wt Readings from Last 8 Encounters:   11/21/18 152 lb 3.2 oz (69 kg)   11/15/18 151 lb (68.5 kg)   11/12/18 155 lb 1.6 oz (70.4 kg)   11/08/18 156 lb 9.6 oz (71 kg)   10/31/18 156 lb (70.8 kg)   09/17/18 156 lb 8 oz (71 kg)       General: Alert and oriented, in no acute distress  Jody has mild Erythema.    Treatment Summary to Date    Aria chart and setup information reviewed    Ovidio Capone MD

## 2021-06-21 NOTE — PROGRESS NOTES
Pt ambulatory to radiation clinic for follow up prior to SIM. VSS, has neuropathy pain in feet. Further recommendations and orders per provider.    Exam: SIM    Date: 10/31/2018  Vitals:    10/31/18 1021   BP: 109/77   Pulse: 94   Temp: 98.2  F (36.8  C)   TempSrc: Oral   SpO2: 95%   Weight: 156 lb (70.8 kg)       Please ask your patient the following questions before you give any injection of a contrast media. If someone other than the patient is responding to these questions, please indicate the respondent's name and relationship to the patient.    Respondent Name:                  Relationship to patient:    Name: Jody Ch        List any allergies:   Allergies   Allergen Reactions     Gabapentin Swelling     Latex Other (See Comments)     Sulfa (Sulfonamide Antibiotics) Other (See Comments)     Tetracycline Other (See Comments)       Does the patient have renal disease?     No  Does the patient have diabetes?   No  If patient has diabetes, is metformin or metformin combination drug currently prescribed (i.e. Actoplus Met, Avandmet, Fortamet, Glucophage, Glucovance, Glumetza, Junamet, Prandimet, Metaglip, or Riomet)?       No  Is the patient pregnant? No  Is the patient on dialysis? No  Does the patient have cancer? Yes  Does the patient have a history of cancer? Yes  When was the patient diagnosed? current  Treatment: [facility] for RT  Date of last chemo treatment: completed    LAB RESULTS       CREATININE       Lab Results   Component Value Date    CREATININE 0.69 10/31/2018           (Normal Range: Male: 0.6-1.5 mg/dL  Female: 0.5-1.3mg/dL)   (A Creatinine result greater than 6.0 mg/dL is a Critical value-the ordering provider must be   notified)        LASTLAB(EGFR,GFRNONAA,GFRAA)@ ml/min/1.73M2   (Normal Range >60)         CT Only  If the eGFR is less than 30 the radiologist must be informed  If labs are abnormal, was the physician informed?  Yes   Staff s Initials HC      This procedure involves an  intravenous contrast media injection.  IV started in the left upper chest port. Good blood return.   Power Port     Acacia Bunn

## 2021-06-21 NOTE — PROGRESS NOTES
[facility] Radiation Oncology Follow Up Note    Patient: Jody Ch  MRN: [id number]  Date of Service: 10/31/2018    Assessment / Impression     1. 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  ECOG Peformance Status  ECOG Performance Status: 1  Distress Assessment Score  Distress Assessment Score: 1  Body site: Breast     Plan:   We had a discussion regarding radiation therapy including side effects. We went over the importance of wearing her compression sleeve during radiation treatment. The patient voices understanding of the information discussed. We will obtain CT sim to begin radiation planning. All questions and concerns addressed.     Face to face time  25 minutes with > 75% spent on consultation, education and coordination of care.    Subjective:      HPI: Jody Ch is a 78 y.o. female with right sided breast cancer, s/p right mastectomy and left mastectomy (2006).     The patient was originally diagnosed with left breast cancer in 2006, IDC grade II, DCIS, ER/SD pos, HER-2 neg. She subsequently underwent radical mastectomy and completed 5 years of Arimidex. No radiation at that time.     Her most recent screening mammogram in May 2018 showed an abnormality in the upper quadrant of the right breast. She subsequently underwent US left breast biopsy which showed IDC grade I, ER/SD neg. She then had right mastectomy with SLN biopsy on 6/1/2018, final pathology returned with 2.5 cm IDC grade II, lymphovascular invasion present, 4/13 LN pos with largest being 1.2 cm, HER-2 negative by FISH.      The patient has underwent PET CT without evidence of metastatic disease. A left thyroid nodule has been biopsied which was negative. CT chest showing stable lung nodules. She has completed 4 cycles ddAC + Neulasta support chemotherapy. She started Taxol on 9/04/2018 and last received  C4D1 on 10/16/2018. Also has been seeing PT for ROM and lymphedema.     The patient returns today for CT sim to begin radiation planning. She has noticed thrush with her most recent chemo and nystatin has been improving symptoms.     Current Outpatient Prescriptions   Medication Sig Dispense Refill     acetaminophen (TYLENOL) 500 MG tablet Take 1,000 mg by mouth every 6 (six) hours as needed.       calcium carbonate (OS-PENNY) 500 mg calcium (1,250 mg) chewable tablet Chew 500 mg 3 (three) times a day.       cholecalciferol, vitamin D3, (VITAMIN D3) 400 unit cap Take 2,000 Units by mouth daily.       diphenhydrAMINE (BENADRYL) 25 mg tablet Take 12.5 mg by mouth at bedtime as needed.       escitalopram oxalate (LEXAPRO) 10 MG tablet Take 10 mg by mouth daily.       No current facility-administered medications for this visit.        The following portions of the patient's history were reviewed and updated as appropriate: allergies, current medications, past family history, past medical history, past social history, past surgical history and problem list.    Review of Systems    General  Constitutional (WDL): Exceptions to WDL  Fatigue: Fatigue relieved by rest  EENT  Eye Disorder (WDL): Exceptions to WDL  Dry Eye: Asymptomatic, clinical or diagnostic observations only, mild symptoms relieved by lubricants  Ear Disorder (WDL): All ear disorder elements are within defined limits  Respiratory       Respiratory (WDL): Exceptions to WDL  Cough: Mild symptoms, nonprescription intervention indicated  Cardiovascular  Cardiovascular (WDL): All cardiovascular elements are within defined limits  Endocrine     Gastrointestinal  Gastrointestinal (WDL): Exceptions to WDL (has thrush)  Dysgeusia: Altered taste but no change in diet  Musculoskeletal  Musculoskeletal and Connetive Tissue Disorders (WDL): All Musculoskeletal and Connetive Tissue Disorder elements are within defined limits  Integumentary               Integumentary (WDL):  All integumentary elements are within defined limits  Neurological  Neurosensory (WDL): Exceptions to WDL  Peripheral Motor Neuropathy: Moderate symptoms, limiting instrumental ADL  Peripheral Sensory Neuropathy: Moderate symptoms, limiting instrumental ADL  Psychological/Emotional   Patient Coping: Accepting  Hematological/Lymphatic  Lymph (WDL): All lymph disorder elements are within defined limits  Dermatologic     Genitourinary/Reproductive  Genitourinary (WDL): All genitourinary elements are within defined limits  Reproductive     Pain              Currently in Pain: No/denies  AUA Assessment                                  Accompanied by  Accompanied by: Family Member      Objective:     Physical Exam    Vitals:    10/31/18 1021   BP: 109/77   Pulse: 94   Temp: 98.2  F (36.8  C)   TempSrc: Oral   SpO2: 95%   Weight: 156 lb (70.8 kg)     Head: Normocephalic, without obvious abnormality, atraumatic  Neck: no adenopathy and supple, symmetrical, trachea midline  Lungs: clear to auscultation bilaterally  Breasts:  expected post operative changes, no masses skin changes suggestive of recurrence or infection.   Heart: regular rate and rhythm  Skin: Skin color, texture, turgor normal. No rashes or lesions  Lymph nodes: Cervical, supraclavicular, and axillary nodes normal.  Extremities: No lymphedema, full ROM UE.   Neurologic: Grossly normal  Psych: affect normal, thought content appropriate.         Recent Labs: No results found for this or any previous visit (from the past 168 hour(s)).    Imaging: Imaging results 30 days: No results found.    I, Janine Barrera MD personally performed the services described in this documentation, as scribed by Harshad Agarwal in my presence, and it is both accurate and complete.    Signed by: Janine Barrera MD, MPH

## 2021-06-21 NOTE — PROGRESS NOTES
"  RADIATION ONCOLOGY WEEKLY TREATMENT VISIT NOTE      Assessment / Impression       1. 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 Have noticed some redness on face, appointment with dermatology tomorrow. She also finished her course of Diflucan but has not noticed significant improvement of her symptoms, likely chemo related. Otherwise no complaints. All questions and concerns addressed.    Plan:   1. Continue radiation treatment as prescribed.  2. Avoid heat in the radiation area.  3. Keep appointment with dermatology, face not in radiation field.  4. Completed antifungal for thrush, no change in \"thickness of tongue\" so probably chemo related not thrush     Radiation: Site: Right CW  Stereotactic Radiosurgery: No  Stereotactic Radiosurgery: No  Concurrent Therapy: No  Today's Dose: 1080  Total Dose for Breast: 6040  Today's Fraction/Total Fraction Breast:       Subjective:      HPI: Jody Ch is a 78 y.o. female with    1. 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    Assessment                  Body Site: Breast                           Site: Right CW  Stereotactic Radiosurgery: No  Concurrent Therapy: No  Today's Dose: 1080  Total Dose for Breast: 6040  Today's Fraction/Total Fraction Breast:   Drainage: 0: Absent                                            Sexuality Alteration                 Emotional Alteration Copin: Effective  Comfort Alteration KPS: 100 % Normal, no complaints  Fatigue (ONS scale) : 0: No Fatigue  Pain Location: denies  Hot Flashes and/or Flushes: 1: Mild or no more than 1 " per day   Nutrition Alteration Anorexia: 0: None  Nausea: 0: None  Vomitin: None  Skin Alteration Skin Sensation: 0: No problem  Skin Reaction: 0: None  AUA Assessment                                  Accompanied by       Objective:     Exam:     Vitals:    11/15/18 1021   BP: 148/77   Pulse: 94   Temp: 98.3  F (36.8  C)   TempSrc: Oral   SpO2: 95%   Weight: 151 lb (68.5 kg)       Wt Readings from Last 8 Encounters:   11/15/18 151 lb (68.5 kg)   18 155 lb 1.6 oz (70.4 kg)   18 156 lb 9.6 oz (71 kg)   10/31/18 156 lb (70.8 kg)   18 156 lb 8 oz (71 kg)       General: Alert and oriented, in no acute distress  Jody has no Erythema.    Treatment Summary to Date    Aria chart and setup information reviewed    IJanine MD personally performed the services described in this documentation, as scribed by Harshad Agarwal in my presence, and it is both accurate and complete.    Signed by: Janine Barrera MD, MPH

## 2021-06-21 NOTE — PROGRESS NOTES
RADIATION ONCOLOGY WEEKLY TREATMENT VISIT NOTE      Assessment / Impression       1. 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     Tolerating radiation therapy well. Now on Diflucan for thrush. All questions and concerns addressed.    Plan:   1. Continue radiation treatment as prescribed.  2. Instructed patient to perform upper extremity ROM exercises for next 5-6 months.   3. Provided with Radiaplex to use during treatment, demonstrated in clinic where to apply this ointment.   4. We discussed that we were unable to avoid the airways and she may develop discomfort in her throat. She will require thyroid testing starting 6 months after radiation which will need to continue for the rest of her life.     Radiation: Site: Right CW  Stereotactic Radiosurgery: No  Stereotactic Radiosurgery: No  Concurrent Therapy: No  Today's Dose: 180  Total Dose for Breast: 6040  Today's Fraction/Total Fraction Breast:     Subjective:      HPI: Jody Ch is a 78 y.o. female with    1. 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    Assessment                  Body Site: Breast                           Site: Right CW  Stereotactic Radiosurgery: No  Concurrent Therapy: No  Today's Dose: 180  Total Dose for Breast: 6040  Today's Fraction/Total Fraction Breast:   Drainage: 0: Absent                                            Sexuality Alteration                 Emotional Alteration Copin: Effective  Comfort Alteration KPS: 100 % Normal, no complaints  Fatigue (ONS scale) : 0: No Fatigue  Pain  Location: denies  Hot Flashes and/or Flushes: 1: Mild or no more than 1 per day   Nutrition Alteration Anorexia: 0: None  Nausea: 0: None  Vomitin: None  Skin Alteration Skin Sensation: 0: No problem  Skin Reaction: 0: None (radiaplex given w/instructions)  AUA Assessment                                  Accompanied by       Objective:     Exam:     Vitals:    18 1028   BP: 118/67   Pulse: (!) 101   Temp: 98.3  F (36.8  C)   TempSrc: Oral   SpO2: 96%   Weight: 156 lb 9.6 oz (71 kg)       Wt Readings from Last 8 Encounters:   18 156 lb 9.6 oz (71 kg)   10/31/18 156 lb (70.8 kg)   18 156 lb 8 oz (71 kg)       General: Alert and oriented, in no acute distress  Jody has no Erythema.    Treatment Summary to Date    Aria chart and setup information reviewed    I, Janine Barrera MD personally performed the services described in this documentation, as scribed by Harshad Agarwal in my presence, and it is both accurate and complete.    Signed by: Janine Barrera MD, MPH

## 2021-06-21 NOTE — PROGRESS NOTES
"Vascular/Wound Consultation-Swelling-UpperExtremity      I have been asked to see Jodykevin Ch referred by Janine Barrera MD    Date of Service: 11/12/18    Chief Complaint:  right arm swelling    History of Present Illness:  This 78 y.o. female presents to the [facility] as a new consult to evaluate right arm swelling  The patient's problems began in 2006 when she was diagnosed with grade 2 DCIS.  She underwent left modified radical mastectomy with 5 years of Arimidex.  No radiation was required.  In May 2018 there was an abnormality seen in the upper quadrant of the right breast.  This was diagnosed with grade 1 ER HI negative breast carcinoma.  Right mastectomy with lymph node dissection done with final pathology being a 2.5 cm grade 2 here to negative ER HI negative breast carcinoma.  PET scan showed no evidence of metastatic disease.  She had 4 cycles of Adriamycin and Cytoxan with Neulasta support.  She then had Taxol.  She completed her chemotherapy on 10/16/2018 and then started radiation.  She will finish radiation the end of December.  She developed swelling and pain in the right wrist soon after the right mastectomy.  She wears a compression sleeve.  This helps.  She has some numbness and tingling in both hands and feet associated with the chemotherapy.  The swelling improved since onset.  There has been otherwise no new numbness, tingling or weakness.  There have been no new masses, rashes, or swellings of any other joints. There have been no infections.  There has been no new unexplained weight loss, loss of appetite, nausea and/or vomiting, shortness of breath, weight loss and chest pain. The swelling does not decrease with elevation\". The swelling is doesn't change. Jody Ch denies pain except for some \"pulling\" in the armpit when she elevates the right arm.     Past Medical History:    Past Medical History:   Diagnosis Date     Anxiety state 1/31/2017 "     Atrophic vaginitis 5/8/2014    Overview:  UPDATE: Followed-up with Dr. Cassie Arteaga on 5-8-14. Noted that patient did not want to pay for the estrogen cream. Impression female stress incontinence and atrophic vaginitis.     Bilateral leg paresthesia 5/13/2014    Overview:  US FRANCISCO W EXERCISE BILATERAL 5-14-14: IMPRESSION: RIGHT LOWER EXTREMITY: FRANCISCO at rest is normal with a normal response to exercise. These findings would not be consistent with symptoms of arterial claudication. LEFT LOWER EXTREMITY: FRANCISCO at rest is normal with a normal response to exercise. These findings would not be consistent with symptoms of arterial claudication. US VENOUS LOWER EXTREMITY LEFT 5-14-14: Left leg veins are negative for DVT. EMG 5-19-14: Borderline abnormal EMG due to low amplitude compound motor action potential in the motor nerves with normal conduction velocities. This could suggest early axonal polyneuropathy. Such changes can be seen in patient's with prediabetes.     Bone pain 7/24/2018     Breast cancer (H)      Breast cancer, stage 2, right (H) 5/16/2018    Overview:  Added automatically from request for surgery [id number]     Chronic cough 2/12/2018    Overview:  XR CHEST 2 VIEWS PA AND LATERAL 12-4-17: Normal heart size and pulmonary vascularity. Tiny granulomas with some fibrosis in the right lung base. The left lung is clear. Slight deviation of the upper trachea from left to right presumably due to thyroid enlargement stable. No change from previous. No acute process is identified. No focal pulmonary infiltrates.     Chronic diarrhea 5/12/2017    Overview:  UPDATE: COLONOSCOPY DIAGNOSTIC 7-25-17: Aphthous ulcer of ileum. Diverticulosis of colon without diverticulitis. Pathology Results: NEOTERMINAL ILEUM,  BIOPSY: Nonspecific chronic active ileitis. No dysplasia or malignancy. COLON, RANDOM, BIOPSY: Normal colonic mucosa STOOL TESTS on 5-12-17 for culture, Clostridium dificile toxin, WBC, Giardia antigen, Campylobacter,  Shiga toxin, Stool for ova and parasite were negative       Chronic pain of right knee 10/23/2017    Overview:  XR KNEE 3 VIEWS RIGHT 10-23-17: Negative knee. No fracture or dislocation. No joint effusion.     Chronic rhinitis 6/8/2017     Chronic right hip pain 10/23/2017    Overview:  XR HIP 2 OR 3 VIEWS W PELVIS RIGHT 10-23-17: Arthritic change right hip. Pelvis otherwise negative.     Decreased range of motion of right shoulder 10/8/2018     Diarrhea 7/24/2018     Difficult or painful urination 12/8/2009    Overview:  UPDATE: Followed-up with Dr. Cassie Arteaga on 5-8-14. Noted that patient did not want to pay for the estrogen cream. Impression female stress incontinence and atrophic vaginitis. Exacerbation of her dysuria symptoms. She has not used her estrace vaginal cream due to cost noted 9-10-13. Urinalysis negative on 9-10-13. Urinalysis and urine microscopy showed some signs of urinary tract infection 9-12-13 . Prescription for ciprofloxacin 250 mg twice daily for 7 days sent to pharmacy. Followed-up with Dr. Bayron Seymour 11-5-13. Rx ciprofloxacin 500 mg twice daily (#30 tablets) so she can self-treat as needed for signs of UTI. Estrace not covered by insurance and so not refilled. Consider Premarin cream.  Dysuria 12-8-09 with negative UA and urine culture. Dysuria 9-28-10 with negative UA and urine microscopy. Advised urology consult 12-8-09 and 9-28-10 if problem recurrent. Advised Pyridium prn on 9-28-10.Previous biofeedback treatment for urge incontinence, urinary frequency and dysuria. Followed-up M     Drug-induced nausea and vomiting 7/17/2018     Dry mouth 5/24/2016     Gallbladder polyp 4/14/2012    Overview:  UPDATE:US ABDOMEN LIMITED RUQ 4-18-16: 5 mm stone versus polyp in the gallbladder, decreased in size since comparison study where measured 7 mm. The gallbladder is contracted despite being NPO, which limits evaluation. Benign parapelvic cyst in the lower pole of the right kidney measuring 2.8  cm, is unchanged. US ABDOMEN LIMITED RUQ on 2-3-14:  Stable appearance of a 8 mm cholesterol polyp within the medial wall of the gallbladder. Incidentally noted is a small 7 mm hyperechoic focus within the right hepatic lobe. This likely reflects an incidental small cavernous hemangioma. US ABDOMEN LIMITED 12-3-12: Stable appearance of a 7 mm cholesterol polyp within the gallbladder.  CT ABD/PELVIS W/WO IV CONTRAST 3-23-12: Bilateral renal parapelvic cysts. Urinary bladder appears normal. Atrophic uterus and right adnexa. Either bilobed cyst or 2 separate cysts on atrophic left adnexa. Nodular foci in gallbladder suggestive of stones or polyps. Recommend gallbladder sonogram. US ABDOMEN LIMITED 4-6-12: Mul     Ganglion cyst of flexor tendon sheath of finger of right hand 5/24/2016     History of breast cancer 11/21/2006    Overview:  UPDATE: XR MAMMO UNI SCREEN FFDM RIGHT 4-14-14: ACR 1 Negative. Followed-up with oncologist Dr. Phyllis Colon on 4-29-14. Stable.    XR MAMMO UNI SCREEN FFDM RIGHT: 4-4-12, 4-5-13: ACR 2 Benign Finding. Followed-up with Dr. Phyllis Colon 5-23-13. CBC and CMP normal except for low glucose of 55 . CA 27-29 normal.   Followed-up with Dr. Phyllis Colon 11-16-12. CBC and CMP normal except for low glucose of 67 . CA 27-29 normal. stage I infiltrating ductal carcinoma, s/p left radical mastectomy, T1c N0 M0, ER/[place] (+), on Arimedex started 2-2007 and needed for 5 years (until 2-2012). Oncologist [facility] (Dr. Louise Burns). Follow-up oncologist 11-9-11. Discontinue Arimidex in  per patient request due to muscle ache. Discontinue Fosamax once current prescription is done as bone density should improve once off Arimidex. .     Hypercholesteremia 12/8/2014    Overview:  UPDATE: Rx atorvastatin (Lipitor) 1-22-15. She sent medical message on 3-18-15 requesting to take atorvastatin (Lipitor) 20 mg tablet every other day. This would not work for atorvastatin (Lipitor) . I advised to take  half tablet (10 mg) daily rather than taking one tablet every other day. ASCVD Risk  10 year risk 7.9 % calculated on 12/8/2014.  Recommendation moderate to high - intensity statin.      IC (interstitial cystitis) 12/2/2010    Overview:  UPDATE: UPDATE: Followed-up with Dr. Cassie Arteaga on 5-8-14. Noted that patient did not want to pay for the estrogen cream. Impression female stress incontinence and atrophic vaginitis. Exacerbation of her dysuria symptoms. She has not used her estrace vaginal cream due to cost noted 9-10-13. Urinalysis negative on 9-10-13. Urinalysis and urine microscopy showed some signs of urinary tract infection 9-12-13 . Prescription for ciprofloxacin 250 mg twice daily for 7 days sent to pharmacy. Followed-up with Dr. Bayron Seymour 11-5-13. Rx ciprofloxacin 500 mg twice daily (#30 tablets) so she can self-treat as needed for signs of UTI. Estrace not covered by insurance and so not refilled. Consider Premarin cream.  Previous biofeedback treatment for urge incontinence, urinary frequency and dysuria. Followed-up [facility] since 12-2-10 for interstitial cystitis. Work-up by urologist, Dr. Cassie Arteaga, since 2-27-12 regarding urinary urge incontinence and chronic interstitial cystitis. Rx estrace vaginal c     Ileitis 7/25/2017    Overview:  COLONOSCOPY DIAGNOSTIC 7-25-17: Aphthous ulcer of ileum. Diverticulosis of colon without diverticulitis. Pathology Results: NEOTERMINAL ILEUM,  BIOPSY: Nonspecific chronic active ileitis. No dysplasia or malignancy. COLON, RANDOM, BIOPSY: Normal colonic mucosa . Advised to follow-up with [facility] regarding chronic active ileitis.     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     Left thyroid nodule 4/30/2018     Lymphedema of extremity 10/8/2018     Major depression, recurrent,  full remission (H) 1/31/2017    Overview:  Inpatient treatment for depression after divorce in 1983. Major depression diagnosed due to financial worries diagnosed 11-23-10 by oncologist, Louise Burns. Citalopram 20 mg daily started. Citalopram discontinued on 5-19-11 per patient request.     Malignant neoplasm of upper-inner quadrant of right breast in female, 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) 10/8/2018     Mixed stress and urge urinary incontinence 7/24/2006    Overview:  UPDATE: Followed-up with Dr. Cassie Arteaga on 5-8-14. Noted that patient did not want to pay for the estrogen cream. Impression female stress incontinence and atrophic vaginitis.  Exacerbation of her dysuria symptoms. She has not used her estrace vaginal cream due to cost noted 9-10-13. Urinalysis negative on 9-10-13. Urinalysis and urine microscopy showed some signs of urinary tract infection 9-12-13 . Prescription for ciprofloxacin 250 mg twice daily for 7 days sent to pharmacy. Followed-up with Dr. Bayron Seymour 11-5-13. Rx ciprofloxacin 500 mg twice daily (#30 tablets) so she can self-treat as needed for signs of UTI. Estrace not covered by insurance and so not refilled. Consider Premarin cream. Previous biofeedback treatment for urge incontinence, urinary frequency and dysuria. Followed-up [facility] since 12-2-10 for interstitial cystitis. Work-up by urologist, Dr. Cassie Arteaga, since 2-27-12 regarding urinary urge incontinence and chronic interstitial cystitis. Rx estrace vaginal cream wit     Nasal congestion 6/5/2014     Osteopenia 11/1/2006    Overview:  UPDATE:  XR DXA BONE DENSITY 2 SITES AXIAL 5/16/2018: Osteopenia. Lumbar Spine L1-L4 T-score -1.8 . Mean Bilateral Femoral Neck T-score -1.4 . FRAX Results: 10-year probability of major osteoporotic fracture is 10.8%, and of hip fracture is 2%, based on left femoral neck BMD. Basic bone health recommended.  XR DXA BONE DENSITY 2 SITES AXIAL 4-18-16: T score AP spine -2.1, Left femoral neck  -1.1, R femoral neck -1.5. FRAX major osteoporotic fracture 11% and FRAX hip fracture score 2.0 %. Basic bone health recommended.  DEXA scan 4-14-14 T score -2.1 AP spine, -1.4 left femoral neck and -1.5 on right femoral neck. FRAX score not calculated. Bone density test stable. Recheck 4-2016. DEXA scan 4-4-12 T score -2.1 AP spine, -1.3 left femoral neck and -1.2 on right femoral neck. FRAX major osteoporotic score 9.6% and FRXA hip fracture score 1.3%. Recheck 4-2014 DEXA scan 3-20-08 T score -2.2 spine, -1.0 left femoral neck, -1.2 right femoral neck. DEXA 3-2-10: T score -1.7 AP spine, -1.0 L femoral ne     Other emphysema (H) 4/30/2018     Other specified disorders of nose and nasal sinuses 7/24/2018     Peripheral axonal neuropathy 11/22/2011    Overview:  UPDATE:  She thinks gabapentin (Neurontin) has been helpful without side effect of leg swelling discussed on 11-20-14. She agreed to increase dose to 300 mg at bedtime. She called on 10-30-14 requesting for gabapentin (Neurontin) as nortriptyline not helpful. Reminded her that she complained of leg swelling with gabapentin (Neurontin) in the past.  EMG 5-19-14: Borderline abnormal EMG due to low amplitude compound motor action potential in the motor nerves with normal conduction velocities. This could suggest early axonal polyneuropathy. Such changes can be seen in patient's with prediabetes. Neurology follow-up on 4-2-13. Advised to give nortriptyline 25 mg daily another try. If unable to tolerate, consider duloxetine (Cymbalta) . Neurology consult Dr. Luther Armenta on 3-26-12. Impression essential tremor. MRI brain. Neurontin 100 mg bid started to help tremors and bilateral lower extremity paresthesia thought due to impaired FG or prediabetes. Normal EMG of lower extremity on 4-11-12 but comp     Personal history of tobacco use, presenting hazards to health 10/22/2018    Overview:  20 pack years     Pharyngeal dysphagia 12/8/2009     Recurrent UTI  9/12/2013    Overview:  UPDATE: Followed-up with Dr. Cassie Arteaga on 5-8-14. Noted that patient did not want to pay for the estrogen cream. Impression female stress incontinence and atrophic vaginitis.  Exacerbation of her dysuria symptoms. She has not used her estrace vaginal cream due to cost noted 9-10-13. Urinalysis negative on 9-10-13. Urinalysis and urine microscopy showed some signs of urinary tract infection 9-12-13 . Prescription for ciprofloxacin 250 mg twice daily for 7 days sent to pharmacy. Followed-up with Dr. Bayron Seymour 11-5-13. Rx ciprofloxacin 500 mg twice daily (#30 tablets) so she can self-treat as needed for signs of UTI. Estrace not covered by insurance and so not refilled. Consider Premarin cream.  Recheck Urinalysis and urine microscopy showed some signs of urinary tract infection 9-12-13 . Prescription for ciprofloxacin 250 mg twice daily for 7 days sent to pharmacy.     Salivation excessive 12/17/2008    Overview:  may be due to postnasal drip causing excessive salivation as she tends to swallow the postnasal drainage.She is not candidate for tricyclic antidepressant like nortriptyline (can cause dry mouth) to lessen salivary secretions as this would excerbate her dry lips.     SOB (shortness of breath) 5/13/2014    Overview:  XR CHEST 2 VIEWS PA AND LATERAL 5-13-14: Impression: On the lateral view, a small patchy opacity is again visualized and has a few linear markings. This may be chronic, it is suggested on the prior exam slightly more prominent but this is probably due to technique, could represent chronic areas of subsegmental volume loss or previous consolidation. It is not well visualized on the PA view. Overall heart size and pulmonary vascular are normal. No pleural abnormalities. Stable appearance of somewhat confluent markings in the right apex as well as superimposition with the right 1st costochondral junction. No additional areas of significant consolidation.     Tremor,  essential 11/22/2011    Overview:  Neurology consult Dr. Luther Armenta on 3-26-12. Impression essential tremor. MRI brain. Neurontin 100 mg bid started to help tremors and bilateral lower extremity paresthesia thought due to impaired FG or prediabetes. Normal EMG of lower extremity on 4-11-12 but compound motor action potentials low which can be seen in early axonal neuropathy. Developed swelling with gabapentin (Neurontin) . MR SPINE LUMBAR WITHOUT 8-17-12: L5-S1 marked charley. facet arthrosis causing degenerative grade I L5 anterolisthesis but causing only mild up-down foraminal stenosis and no compression of adjacent nerves. Tapering disc degeneration. No fracture, neoplasm or infection. US ANKLE BRACHIAL INDEX  8-17-12: mild athrosclerotic disease both lower extremities. Comprehensive lab tests ordered 9-17-12 by Dr. Armenta regarding neuropathy. Rx changed to nortriptyline which was increased to 50 mg daily at bedtime on 9-17-12. Impression was likely axonal neuropathy due to prediabetes.     Vitamin D insufficiency 12/3/2012    Overview:  Vitamin D was low at 28.1 on 12-3-12. Take vitamin D 3000 units daily for 8 weeks, then take vitamin D 2000 units indefinitely. Vitamin D was normal at 40.8 on 3-6-13. Continue vitamin D 2000 units indefinitely.     Vitreous degeneration 10/22/2018    Overview:  Right eye       Surgical History:   Past Surgical History:   Procedure Laterality Date     APPENDECTOMY       BLEPHAROPLASTY Bilateral 11/07/2013     BREAST LUMPECTOMY  11/21/2006    Infiltrating ductal carcinoma, micropapillary variant, Janey     FINGER GANGLION CYST EXCISION Right 06/23/2016    right ring finger     MASTECTOMY MODIFIED RADICAL Left 12/06/2006     MS REMOVE EYELID LESN (NOT CHALAZION) Right 11/07/2013     RIGHT SIMPLE MASTECTOMY WITH RIGHT SENTINAL LYMPH NODE [id number] AND RIGHT AXILLARY NODE DISSECTION Right 06/01/2018     US BIOPSY BREAST NEEDLE W BIBIANA W GUIDE RIGHT Right 05/18/2018       Medications:     Current Outpatient Medications:      calcium carbonate (OS-PENNY) 500 mg calcium (1,250 mg) chewable tablet, Chew 500 mg 3 (three) times a day., Disp: , Rfl:      cholecalciferol, vitamin D3, (VITAMIN D3) 400 unit cap, Take 2,000 Units by mouth daily., Disp: , Rfl:      diphenhydrAMINE (BENADRYL) 25 mg tablet, Take 12.5 mg by mouth at bedtime as needed., Disp: , Rfl:      escitalopram oxalate (LEXAPRO) 10 MG tablet, Take 10 mg by mouth daily., Disp: , Rfl:      acetaminophen (TYLENOL) 500 MG tablet, Take 1,000 mg by mouth every 6 (six) hours as needed., Disp: , Rfl:      FLUCONAZOLE ORAL, Take by mouth Daily at 8:00 am.., Disp: , Rfl:     Current Facility-Administered Medications:      heparin 100 unit/mL lockflush (PF) porcine 300-600 Units, 300-600 Units, Intravenous, PRN, Janine Barrera MD, 600 Units at 10/31/18 1200    Allergies:   Allergies   Allergen Reactions     Gabapentin Swelling     Latex Other (See Comments)     Sulfa (Sulfonamide Antibiotics) Other (See Comments)     Tetracycline Other (See Comments)       Family history:   Family History   Problem Relation Age of Onset     Diabetes Brother      Hypertension Brother      Prostate cancer Father      Kidney disease Father         One kidney does not function     Seizures Father      Cancer Maternal Grandmother         bladder cancer     Arthritis Maternal Grandmother      Hypertension Maternal Grandmother      Osteoporosis Maternal Grandmother      Alcohol abuse Mother      Drug abuse Mother      Ulcers Mother      Hypertension Mother      Diabetes Brother      Hypertension Brother      Allergic rhinitis Sister      Asthma Sister      Breast cancer Maternal cousin         DCIS       Social History:   Social History     Socioeconomic History     Marital status:      Spouse name: Not on file     Number of children: Not on file     Years of education: Not on file     Highest education level: Not on file   Social Needs     Financial  resource strain: Not on file     Food insecurity - worry: Not on file     Food insecurity - inability: Not on file     Transportation needs - medical: Not on file     Transportation needs - non-medical: Not on file   Occupational History     Not on file   Tobacco Use     Smoking status: Former Smoker     Packs/day: 0.50     Years: 40.00     Pack years: 20.00     Types: Cigarettes     Last attempt to quit: 2005     Years since quittin.7     Smokeless tobacco: Never Used   Substance and Sexual Activity     Alcohol use: No     Comment: occassionaly      Drug use: No     Sexual activity: Not on file   Other Topics Concern     Not on file   Social History Narrative     Not on file         Labs:   I personally reviewed the following labs today and those on care everywhere, if indicated    No results found for: SEDRATE      No results found for: CRP        Lab Results   Component Value Date    CREATININE 0.69 10/31/2018      No results found for: HGBA1C        No results found for: BUN           No results found for: LABPROT, ALBUMIN    No results found for: [id number]    No results found for: TSH  No results found for: WBC, HGB, HCT, MCV, PLT     A) RIGHT BREAST, MASTECTOMY:   1. Invasive ductal carcinoma with apocrine features, Vidal grade II, located at 1 o'clock      a. Invasive carcinoma measures 2.5 cm      b. Associated DCIS, cribriform type, nuclear grade 3      c. Previous core biopsy site changes   2. Invasive carcinoma and DCIS are located more than 1 cm from all margins   3. Background proliferative fibrocystic changes including nodular adenosis measuring 0.5 cm at 11 o'clock   4. Atypical lobular hyperplasia   5. Benign nipple   6. Breast Ancillary Testing: Performed on prior case ([id number])       a. Estrogen receptor: Negative       b. Progesterone receptor: Negative       c. HER2 by IHC: Equivocal (2+ by manual morphometry)       d. HER2 by FISH: Negative            HER2/CEP17 ratio: 1.04  "           HER2 signals/cell: 1.86     B) RIGHT AXILLARY SENTINEL LYMPH NODE BIOPSY:   One lymph node with metastatic carcinoma (1/1)      a. Metastasis measures 1.2 cm      b. Extranodal invasion is not identified     C) RIGHT AXILLARY SENTINEL LYMPH NODE BIOPSY:   One lymph node negative for carcinoma (0/1)     D) RIGHT AXILLARY SENTINEL LYMPH NODE BIOPSY:   One lymph node with metastatic carcinoma (1/1)      a. Metastasis measures 0.3 cm      b. Extranodal invasion is not identified     E) RIGHT AXILLARY CONTENTS, REGIONAL DISSECTION:   Two of ten lymph nodes positive for micrometastatic carcinoma (2/10)      a. Both micrometastases measure 0.08 cm      b. Extranodal invasion is not identified    Imaging:  I personally reviewed the following imaging today and those on care everywhere, if indicated        Result Narrative   [facility] MEDICAL IMAGING  NM BONE SCAN WHOLE BODY  6/13/2018 11:29 AM    INDICATION: Malignant Neoplasm Of Right Female Breast, Unspecified Estrogen  Receptor Status, Unspecified Site Of Breast (hc)  TECHNIQUE: 22.3 mCi technetium-99m MDP were injected intravenously. Anterior and  posterior delayed whole body images were obtained with additional spot images of  the skull.  COMPARISON: CT from 06/13/2018 and whole-body bone scan from 07/21/2010    FINDINGS: Chronic focal radiotracer accumulation on the left side of the  mandible, typical of periodontal disease. Degenerative change has developed in  the left first carpometacarpal joint and patellofemoral compartment of the left  knee. Whole-body bone scan otherwise negative. No evidence of skeletal  metastases.                        Review of Symptoms:  Full 12 point review of systems is negative, except as noted above.    Physical Exam:      Vital Signs: BP 92/64 (Patient Site: Left Arm, Patient Position: Sitting, Cuff Size: Adult Regular)   Pulse 80   Temp 99.1  F (37.3  C) (Oral)   Resp 16   Ht 5' 2\" (1.575 m)   Wt 155 lb 1.6 oz (70.4 kg)   " BMI 28.37 kg/m       Circumferential Volume Measures:    Vasc Edema 11/12/2018   Right-just above MCP 18.8   Right Wrist 16.5   Right Up 10cm 23   Right Up 10cm From Elbow 33.4   Left-just above MCP 18.5   Left Wrist 16.2   Left Up 10cm 22.8   Left Up 10cm From Elbow 34.5     General: In no apparent distress.     Psych: Alert and oriented X 3. Affect normal.    Range of Motion:  Range of motion of shoulders, elbows, wrists is normal bilaterally without active joint synovitis, erythema, joint swelling, crepitus or joint laxity except for tightness in the anterior axilla on the right shoulder at end-stage forward flexion and abduction.  Slight cording is noted.  Range of motion of the neck is full with Spurling's maneuver negative.    Sensation: Intact to pinprick and light touch throughout the upper extremities bilaterally.      Strength:  Normal strength testing in shoulder abduction, elbow flexion, elbow extension, wrist extension, forearm supination, forearm pronation, hand intrinsics, internal rotation and external rotation of the shoulder shoulders bilaterally.      Deep Tendon Reflexes:  Normal biceps, triceps and brachioradialis bilaterally    Lymph nodes: No cervical, supraclavicular, infraclavicular, or axillary lymphadenopathy palpated.    Vascular: No unusual venous distention.  Radial arterial pulses strong and equal at the wrists bilaterally.     Skin: No unusual rubor, calor, masses or rashes along the skin in either arm.  No significant fibrosity or scarring.  No pain to palpation.     Impression: 78 y.o. female with   1. right arm/shoulder tightness  2.  Secondary post mastectomy lymphedema  3.  Breast carcinoma (2006 L mod rad with Arimidex.  5/18 R mod rad then chem and rad)    Plan:        1. Swelling was discussed in detail with the patient and .  Questions were answered.  2.  Physical/Occupational Therapy for swelling and tightness.  This will include exercise, range of motion work on  shoulder contracture and and education  3.  She has a compression sleeve and we discussed the appropriate wearing of it.  We discussed how to exercise safely.  4.  Follow up with me 3 months, or when needed.  She and her  understand the goal is to maintain what she has and not did have her her range of motion decrease or her swelling increase.    Thank you very much for referring Jody Ch.  If you have any questions please feel free to contact me at [phone].    Time spent with patient 60 minutes with greater than 50% time in education counseling and coordination of cares, excluding procedures.      Preeti Jaramillo MD, ABWMS, FACCWS, [facility]  Medical Director Wound Care and Lymphedema  [facility]  [phone]

## 2021-06-21 NOTE — PROGRESS NOTES
Pt started noticing right wrist and had swollen after she had right mastectomy. Pt denied pain on right arm, but have tingling and numbness both hands. Pt is doing arm exercises at home. She does wear compression sleeve. Pt completed chemo therapy in October 16th.

## 2021-06-21 NOTE — PROGRESS NOTES
"[facility] Daily Progress     Patient Name: Jody Ch  Date: 10/23/2018  Visit #: 6  PTA visit #: 4  Referral Diagnosis: [unfilled]  Referring provider: Janine Barrera*  Visit Diagnosis:     ICD-10-CM    1. Lymphedema I89.0    2. Muscle weakness (generalized) M62.81    3. Decreased range of motion of right shoulder M25.611    4. Scar condition and fibrosis of skin L90.5    5. Breast cancer (H) C50.919          Assessment:   Pt fatiqued today due to recent chemotherapy.  Pt demonstrates increased R shoulder ROM.  Pt has been compliant with her HEP and has been walking short distances.   Pt has not been wearing her compression sleeve stating she forgets about it.  Patient is benefitting from skilled physical therapy and is making steady progress toward functional goals.  Patient is appropriate to continue with skilled physical therapy intervention, as indicated by initial plan of care.    Goal Status:  Pt. will demonstrate/verbalize independence in self-management of condition in : 6 weeks  Pt. will show improved balance for safer : for community ambulation;for exercise/recreation;independently;in 6 weeks  Patient will reach / maintain arm movement: overhead;with less pain;with less difficulty;in 4 weeks;for other activity  for other activity: to allow for proper positioining for radiation  Patient will have decreased fibrosis, scar tissue for improved lymphatic mobilitiy : in 4 weeks  Pt will: decrease spadi  by 4 points to demonstrate improved function and decreased pain    Plan / Patient Education:     Continue with initial plan of care.  Progress with home program as tolerated.   Encouraged pt to walk daily.  Pt instructed to wear her compression garment as much as she can.  Pt to see Dr. Jaramillo on November 12.    Subjective:   \" Tired today.\"  Pt states her R UE and chest wall are feeling better.   Pt has been doing her HEP Pt reports the balance drills are challenging.  Pt " reports she was able to reach into a top shelf with her R UE.  Pain Ratin        Objective:    Caregiver present: No     Observation of swelling: decreased visibly and per pt     Volume measurements taken:  no      Skin condition is:  no    Compression: no compression       Medication for infection:  No    R shoulder flexion ROM: 148 degrees    Treatment Today     TREATMENT MINUTES COMMENTS   Evaluation     Self-care/ Home management     Manual therapy 53 MFR to R pec major,   MLD to neck, trunk, abdominals routing to inguinals   Neuromuscular Re-education     Therapeutic Activity     Therapeutic Exercises 5 Reviewed HEP  Added to HEP:  Static balance:  -NBOS eyes closed  -Tandem stand eyes closed   -sit to stand   Gait training     Modality__________________                Total 58    Blank areas are intentional and mean the treatment did not include these items.       ANA Purcell  10/23/2018

## 2021-06-22 NOTE — TELEPHONE ENCOUNTER
Called patient for routine follow up call s/p radiation for her breast cancer.  Left message for patient telling her she did not need to return my call unless she has questions or concerns.  Left follow up appointment information and call back number on message.

## 2021-06-22 NOTE — PROGRESS NOTES
[facility] Daily Progress     Patient Name: Jody Ch   Evaluation Date: 9/28/2018  Today's Date: 11/29/2018  Visit #: 7/10 (through 12- per med [facility] [place])  PTA visit #: 4  Referral Diagnosis:   Referring provider: Preeti Jaramillo, *  Visit Diagnosis:     ICD-10-CM    1. Lymphedema I89.0    2. Muscle weakness (generalized) M62.81    3. Decreased range of motion of right shoulder M25.611    4. Scar condition and fibrosis of skin L90.5          Assessment:     Patient comes in today a month since her most recent PT visit for right UE lymphedema/shoulder decreased ROM. She has seen Dr. Jaramillo since last visit. Does have another order for PT, will continue working on same issues: right shoulder ROM and maintaining good volume.  Patient has been working on some of her stretches at home and wearing compression garment daily. She is almost penitentiary through Radiation.   We discussed today that she should continue to work on stretches every day 2 times per day. And keep wearing her sleeve.   She will return to PT in 3 weeks as she is going through radiation now.   At that time we will reassess to check for any swelling and check on right shoulder ROM.   Patient and spouse agree with plan.     Goal Status:  Pt. will demonstrate/verbalize independence in self-management of condition in : 6 weeks  Pt. will show improved balance for safer : for community ambulation;for exercise/recreation;independently;in 6 weeks  Patient will reach / maintain arm movement: overhead;with less pain;with less difficulty;in 4 weeks;for other activity  for other activity: to allow for proper positioining for radiation    Patient will have decreased fibrosis, scar tissue for improved lymphatic mobilitiy : in 4 weeks  Pt will: decrease spadi  by 4 points to demonstrate improved function and decreased pain      Plan / Patient Education:     Continue with initial plan of care.  Progress with home program as tolerated.    Encouraged pt to walk daily.  Pt instructed to wear her compression garment as much as she can.  Pt to see Dr. Jaramillo on .    Subjective:     Tired, but doing alright.   Not quite half way through with radiation. (Last one is Dec 26 2018)    Pt reports she was able to reach into a top shelf with her R UE.    Pain Ratin    Objective:    Caregiver present: , Los present.    Observation of swelling: none per patient, feels pretty good.    Volume measurements taken:  No, unchanged, patient with little to no swelling in right arm.     Skin condition is: mildly dry. Patient with significant myofascial restrictions right mastectomy incision and pec region. Despite this, she has decent right shoulder ROM>    Compression: Right UE compression garment, wears daily.    Medication for infection:  NA    R shoulder flexion ROM: 154 degrees    Current HEP:  Ball squeezes  scapular retraction  wall slides  supine and standing shoulder assisted flexion  hands behind head adduction/abduction   Balance ex: NBOS, Tandem Stance, Sit<>Stand    Treatment Today     TREATMENT MINUTES COMMENTS   Evaluation     Self-care/ Home management     Manual therapy 40 MFR to R anterior upper quadrant region.   Manual stretching right shoulder    MLD short sequence, not significant swelling present.   Neuromuscular Re-education     Therapeutic Activity     Therapeutic Exercises 15 Ed on keeping up with HEP, went through exercises again today. Discussion of plan for PT.   Gait training     Modality__________________                Total 55    Blank areas are intentional and mean the treatment did not include these items.       Janine Harris , DPT, CLT  2018

## 2021-06-22 NOTE — PROGRESS NOTES
RADIATION ONCOLOGY WEEKLY TREATMENT VISIT NOTE      Assessment / Impression       1. Malignant neoplasm of upper-inner quadrant of right breast in female, 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)       Cancer Staging  No matching staging information was found for the patient.     Tolerating radiation therapy well.  All questions and concerns addressed.    Plan:   1. Continue radiation treatment as prescribed.  2. Continue ROM exercises, keep PT appointment today.     Radiation: Site: Right CW  Stereotactic Radiosurgery: No  Stereotactic Radiosurgery: No  Concurrent Therapy: No  Today's Dose: 2700  Total Dose for Breast: 6040  Today's Fraction/Total Fraction Breast: 15/33      Subjective:      HPI: Jody Ch is a 78 y.o. female with    1. 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    Assessment                  Body Site: Breast                           Site: Right CW  Stereotactic Radiosurgery: No  Concurrent Therapy: No  Today's Dose: 2700  Total Dose for Breast: 6040  Today's Fraction/Total Fraction Breast: 15/33  Drainage: 0: Absent                                            Sexuality Alteration                 Emotional Alteration Copin: Effective  Comfort Alteration KPS: 90% Can perform normal activity, minor signs of disease  Fatigue (ONS scale) : 6: Moderate Fatigue  Pain Location: denies  Hot Flashes and/or Flushes: 1: Mild or no more than 1 per day   Nutrition Alteration Anorexia: 0: None  Nausea: 0: None  Vomitin: None  Skin Alteration Skin Sensation: 0: No problem  Skin Reaction: 0: None  AUA Assessment                                  Accompanied by       Objective:     Exam:     Vitals:     11/29/18 1028   BP: 141/75   Pulse: 93   Temp: 98.4  F (36.9  C)   TempSrc: Oral   SpO2: 94%   Weight: 151 lb 4.8 oz (68.6 kg)       Wt Readings from Last 8 Encounters:   11/29/18 151 lb 4.8 oz (68.6 kg)   11/21/18 152 lb 3.2 oz (69 kg)   11/15/18 151 lb (68.5 kg)   11/12/18 155 lb 1.6 oz (70.4 kg)   11/08/18 156 lb 9.6 oz (71 kg)   10/31/18 156 lb (70.8 kg)   09/17/18 156 lb 8 oz (71 kg)       General: Alert and oriented, in no acute distress  Jody has no Erythema.    Treatment Summary to Date    Aria chart and setup information reviewed    IJanine MD personally performed the services described in this documentation, as scribed by Harshad Agarwal in my presence, and it is both accurate and complete.    Signed by: Janine Barrera MD, MPH

## 2021-06-22 NOTE — PROGRESS NOTES
[facility] Discharge Summary    Patient Name: Jdoy Ch  Date: 1/30/2019  Referral Diagnosis: Lymphedema  Referring provider: Preeti Jaramillo, *    Patient was seen for 8 visits.  See most recent PT note for updated status.  Patient was doing well as of most recent visit    Goals Below were not assessed d/t patient not returning for further PT:  Pt. will demonstrate/verbalize independence in self-management of condition in : 6 weeks  Pt. will show improved balance for safer : for community ambulation;for exercise/recreation;independently;in 6 weeks  Patient will reach / maintain arm movement: overhead;with less pain;with less difficulty;in 4 weeks;for other activity  for other activity: to allow for proper positioining for radiation   Patient will have decreased fibrosis, scar tissue for improved lymphatic mobility:  Pt will: decrease spadi  by 4 points to demonstrate improved function and decreased pain: NT    Physical Therapy will be discharged at this time.    Thank you for your referral.  Janine Harris, DPT, CLT  1/30/2019        Dear Dr. Jaramillo:    For therapy to continue, Medicare and/or Medicaid requires periodic physician review of the treatment plan. Please review the summary of the patient's progress and our plan for continued therapy, and verify  that you agree therapy should continue by entering certification dates at the bottom of this note and co-signing this note.    If you have any questions or concerns, please don't hesitate to call.    Sincerely,    Janine Harris, PT    Physician recommendation:     __X_ Follow therapist's recommendation        ___ Modify therapy    *Physician co-signature indicates they certify the need for these services furnished within this plan and while under their care.          [facility] Daily Progress     Patient Name: Jody Ch   Evaluation Date: 9/28/2018  Today's Date: 12/19/2018  Visit #: 8/20 (through 3-  per med re-cert)  PTA visit #: 4  Referral Diagnosis:   Referring provider: Preeti Jaramillo, *  Visit Diagnosis:     ICD-10-CM    1. Lymphedema I89.0    2. Muscle weakness (generalized) M62.81    3. Decreased range of motion of right shoulder M25.611    4. Scar condition and fibrosis of skin L90.5      Assessment:     Patient comes in today 3 weeks since her most recent PT visit for right UE lymphedema/shoulder decreased ROM.   We remeasured her volume today and still no significant swelling noted.  Patient has been working on some of her stretches at home and wearing compression garment daily. She is almost done with radiation.  We discussed today that she should continue to work on stretches every day 2 times per day. And keep wearing her sleeve.   She will return to PT in 3 weeks to recheck ROM and remeasure arm to check for swelling.  Patient and spouse agree with plan.     Goal Status: Progressing toward  Pt. will demonstrate/verbalize independence in self-management of condition in : 6 weeks  Pt. will show improved balance for safer : for community ambulation;for exercise/recreation;independently;in 6 weeks  Patient will reach / maintain arm movement: overhead;with less pain;with less difficulty;in 4 weeks;for other activity  for other activity: to allow for proper positioining for radiation    Patient will have decreased fibrosis, scar tissue for improved lymphatic mobilitiy : in 4 weeks  Pt will: decrease spadi  by 4 points to demonstrate improved function and decreased pain      Plan / Patient Education:     Continue with initial plan of care.  Progress with home program as tolerated.   Encouraged pt to walk daily.  Pt instructed to wear her compression garment as much as she can.    Updated POC on 12-:  Authorization / Certification Start Date: 12/19/18  Authorization / Certification End Date: 03/13/19  Authorization / Certification Number of Visits: up to 20 visits total  Communication with:  Referral Source;Patient Caregiver  Patient Related Instruction: Nature of Condition;Treatment plan and rationale;Self Care instruction;Basis of treatment;Body mechanics;Posture;Precautions;Next steps;Expected outcome  Times per Week: 1-2x/wk  Number of Weeks: 6-8 wks  Number of Visits: up to 20 total  Discharge Planning: independent in home program  Precautions / Restrictions : chemotherapy/radiation  Therapeutic Exercise: ROM;Stretching;Strengthening;Lymphedema  Neuromuscular Reeducation: kinesio tape;posture;balance/proprioception  Manual Therapy: soft tissue mobilization;myofascial release;lymphatic drainage massage  Functional Training (ADL's): skin care;self care;lymphedema precautions;bandage/garment wear and care;ADL's        Subjective:     Tired, but doing alright.   Still going through radiation (Last one is Dec 27 2018)    Pt reports she doesn't feel limitations with her right arm, but does note a stretch at end range of right arm.    Pain Ratin    Objective:    Caregiver present: , Los present.    Observation of swelling: none per patient, feels pretty good.    Volume measurements taken:  Yes, See below, no swelling present.    Skin condition is: mildly dry. Patient with significant myofascial restrictions right mastectomy incision and pec region. Despite this, she has decent right shoulder ROM    Compression: Right UE compression garment, wears daily.    Medication for infection:  NA    R shoulder flexion ROM: measured in supine: 159 degrees    UE volume measurement: taken 2018  Right Upper Extremity Total Estimated Volume (cm3): 2177.67  Left Upper Extremity Total Estimated Volume (cm3): 2189.79  Right UE change of volume from initial (%): -2.25  Upper Extremity Swelling Comparison (%): 1.71 %    Current HEP:  Ball squeezes  scapular retraction  wall slides  supine and standing shoulder assisted flexion  hands behind head adduction/abduction   Balance ex: NBOS, Tandem Stance,  Sit<>Stand    Treatment Today     TREATMENT MINUTES COMMENTS   Evaluation     Self-care/ Home management     Manual therapy 30 MFR to R anterior upper quadrant region.   Manual stretching right shoulder    MLD short sequence, not significant swelling present.    Time for remeasuring   Neuromuscular Re-education     Therapeutic Activity     Therapeutic Exercises 15 Ed on keeping up with HEP, went through exercises again today. Discussion of plan for PT.   Gait training     Modality__________________                Total 45    Blank areas are intentional and mean the treatment did not include these items.       Janine Harris , DPT, CLT  12/19/2018

## 2021-06-22 NOTE — PROGRESS NOTES
RADIATION ONCOLOGY WEEKLY TREATMENT VISIT NOTE      Assessment / Impression       1. Malignant neoplasm of upper-inner quadrant of right breast in female, 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)       Cancer Staging  No matching staging information was found for the patient.   Tolerating radiation therapy well.  All questions and concerns addressed.    Plan:   1. Continue radiation treatment as prescribed.    Radiation: Site: Right CW  Stereotactic Radiosurgery: No  Stereotactic Radiosurgery: No  Concurrent Therapy: No  Today's Dose: 4500  Total Dose for Breast: 6040  Today's Fraction/Total Fraction Breast:       Subjective:      HPI: Jody Ch is a 78 y.o. female with    1. 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    Assessment                  Body Site: Breast                           Site: Right CW  Stereotactic Radiosurgery: No  Concurrent Therapy: No  Today's Dose: 4500  Total Dose for Breast: 6040  Today's Fraction/Total Fraction Breast:   Drainage: 0: Absent                                            Sexuality Alteration                 Emotional Alteration Copin: Effective  Comfort Alteration KPS: 90% Can perform normal activity, minor signs of disease  Fatigue (ONS scale) : 6: Moderate Fatigue  Pain Location: denies  Hot Flashes and/or Flushes: 0: None   Nutrition Alteration Anorexia: 0: None  Nausea: 0: None  Vomitin: None  Skin Alteration Skin Sensation: 0: No problem  Skin Reaction: 1: Faint erythema or dry desquamation  AUA Assessment                                  Accompanied by       Objective:     Exam:     Vitals:    18 1032   BP: 125/69   Pulse: 90   Temp: 97.7  F (36.5   C)   TempSrc: Oral   SpO2: 95%   Weight: 158 lb (71.7 kg)       Wt Readings from Last 8 Encounters:   12/13/18 158 lb (71.7 kg)   12/06/18 153 lb 4.8 oz (69.5 kg)   11/29/18 151 lb 4.8 oz (68.6 kg)   11/21/18 152 lb 3.2 oz (69 kg)   11/15/18 151 lb (68.5 kg)   11/12/18 155 lb 1.6 oz (70.4 kg)   11/08/18 156 lb 9.6 oz (71 kg)   10/31/18 156 lb (70.8 kg)       General: Alert and oriented, in no acute distress  Jody has mild Erythema.    Treatment Summary to Date    Aria chart and setup information reviewed    IJanine MD personally performed the services described in this documentation, as scribed by Harshad Agarwal in my presence, and it is both accurate and complete.    Signed by: Janine Barrera MD, MPH

## 2021-06-22 NOTE — PROGRESS NOTES
RADIATION ONCOLOGY WEEKLY TREATMENT VISIT NOTE      Assessment / Impression       1. Malignant neoplasm of upper-inner quadrant of right breast in female, 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)       Tolerating radiation therapy well.  All questions and concerns addressed.    Plan:     Follow up in 4 weeks    Subjective:      HPI: Jody Ch is a 78 y.o. female with    1. 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    Assessment                  Body Site: Breast                           Site: Right CW  Stereotactic Radiosurgery: No  Concurrent Therapy: No  Today's Dose: 6040  Total Dose for Breast: 6040  Today's Fraction/Total Fraction Breast: 33/33  Drainage: 0: Absent                                            Sexuality Alteration                 Emotional Alteration Copin: Effective  Comfort Alteration KPS: 90% Can perform normal activity, minor signs of disease  Fatigue (ONS scale) : 6: Moderate Fatigue  Pain Location: denies  Hot Flashes and/or Flushes: 0: None   Nutrition Alteration Anorexia: 0: None  Nausea: 0: None  Vomitin: None  Skin Alteration Skin Sensation: 1:Pruitis  Skin Reaction: 1: Faint erythema or dry desquamation  AUA Assessment                                  Accompanied by       Objective:     Exam: moderate Erythema.    Vitals:    18 1027   BP: 122/72   Pulse: 90   Temp: 98.2  F (36.8  C)   TempSrc: Oral   SpO2: 96%   Weight: 160 lb 3.2 oz (72.7 kg)       Wt Readings from Last 8 Encounters:   18 160 lb 3.2 oz (72.7 kg)   18 158 lb (71.7 kg)   18 158 lb (71.7 kg)   18 153 lb 4.8 oz (69.5 kg)   18 151 lb 4.8 oz (68.6 kg)   18 152 lb 3.2 oz (69  kg)   11/15/18 151 lb (68.5 kg)   11/12/18 155 lb 1.6 oz (70.4 kg)       General: Alert and oriented, in no acute distress  Jody has moderate Erythema.  Aria chart and setup information reviewed    Gloria Posey MD

## 2021-06-22 NOTE — PROGRESS NOTES
RADIATION ONCOLOGY WEEKLY TREATMENT VISIT NOTE      Assessment / Impression       1. Malignant neoplasm of upper-inner quadrant of right breast in female, 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)       Cancer Staging  No matching staging information was found for the patient.   Tolerating radiation therapy well.  All questions and concerns addressed.    Plan:   1. Continue radiation treatment as prescribed.  2. Provided with mometasone PRN itch.  3. Continue ROM exercises for next 4-5 months.  4. Follow up in 4-6 weeks after completion of radiation therapy.     Radiation: Site: Right CW  Stereotactic Radiosurgery: No  Stereotactic Radiosurgery: No  Concurrent Therapy: No  Today's Dose: 5440  Total Dose for Breast: 6040  Today's Fraction/Total Fraction Breast: 30/33        Subjective:   Radiation Treatment Summary    HISTORY: Jody Ch is a 78 y.o. female who was treated 3D conformal radiation therapy for right sided breast cancer, s/p right mastectomy and left mastectomy (2006).     The patient was originally diagnosed with left breast cancer in 2006, IDC grade II, DCIS, ER/ID pos, HER-2 neg. She subsequently underwent radical mastectomy and completed 5 years of Arimidex. No radiation at that time.     Her most recent screening mammogram in May 2018 showed an abnormality in the upper quadrant of the right breast. She subsequently underwent US left breast biopsy which showed IDC grade I, ER/ID neg. She then had right mastectomy with SLN biopsy on 6/1/2018, final pathology returned with 2.5 cm IDC grade II, lymphovascular invasion present, 4/13 LN pos with largest being 1.2 cm, HER-2 negative by FISH.      The patient has underwent PET CT without evidence of metastatic disease. A left thyroid nodule has been biopsied which was negative. CT chest showing stable lung nodules. She has completed 4 cycles ddAC + Neulasta support chemotherapy. She started Taxol on 9/04/2018 and finished C4D1 on 10/16/2018. She required PT  for ROM and lymphedema.    SITE TREATED: right CW  TOTAL DOSE: 6040  NUMBER OF FRACTIONS: 33  DATES COMPLETED: 2018 - 2018  CONCURRENT CHEMOTHERAPY: No  ADJUVANT THERAPY:Yes    She tolerated the treatment without unexpected side effects.       The following portions of the patient's history were reviewed and updated as appropriate: allergies, current medications, past family history, past medical history, past social history, past surgical history and problem list.    Assessment                  Body Site: Breast                           Site: Right CW  Stereotactic Radiosurgery: No  Concurrent Therapy: No  Today's Dose: 5440  Total Dose for Breast: 6040  Today's Fraction/Total Fraction Breast:   Drainage: 0: Absent                                            Sexuality Alteration                 Emotional Alteration Copin: Effective  Comfort Alteration KPS: 90% Can perform normal activity, minor signs of disease  Fatigue (ONS scale) : 6: Moderate Fatigue  Pain Location: denies  Hot Flashes and/or Flushes: 0: None   Nutrition Alteration Anorexia: 0: None  Nausea: 0: None  Vomitin: None  Skin Alteration Skin Sensation: 0: No problem  Skin Reaction: 1: Faint erythema or dry desquamation  AUA Assessment                                  Accompanied by       Objective:     Exam:     Vitals:    18 1038   BP: 123/82   Pulse: (!) 104   Temp: 98.5  F (36.9  C)   TempSrc: Oral   SpO2: 98%   Weight: 158 lb (71.7 kg)       Wt Readings from Last 8 Encounters:   18 158 lb (71.7 kg)   18 158 lb (71.7 kg)   18 153 lb 4.8 oz (69.5 kg)   18 151 lb 4.8 oz (68.6 kg)   18 152 lb 3.2 oz (69 kg)   11/15/18 151 lb (68.5 kg)   18 155 lb 1.6 oz (70.4 kg)   18 156 lb 9.6 oz (71 kg)       General: Alert and oriented, in no acute distress  Jody has mild Erythema.    Treatment Summary to Date    Aria chart and setup information reviewed    Janine MCCORD  MD Bruce personally performed the services described in this documentation, as scribed by Harshad Agarwal in my presence, and it is both accurate and complete.    Signed by: Janine Barrera MD, MPH

## 2021-06-22 NOTE — PROGRESS NOTES
RADIATION ONCOLOGY WEEKLY TREATMENT VISIT NOTE      Assessment / Impression       1. Malignant neoplasm of upper-inner quadrant of right breast in female, 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)       Cancer Staging  No matching staging information was found for the patient.   Tolerating radiation therapy well. Noticing minor fatigue.  All questions and concerns addressed.    Plan:   1. Continue radiation treatment as prescribed.    Radiation: Site: Right CW  Stereotactic Radiosurgery: No  Stereotactic Radiosurgery: No  Concurrent Therapy: No  Today's Dose: 3600  Total Dose for Breast: 6040  Today's Fraction/Total Fraction Breast:       Subjective:      HPI: Jody Ch is a 78 y.o. female with    1. 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    Assessment                  Body Site: Breast                           Site: Right CW  Stereotactic Radiosurgery: No  Concurrent Therapy: No  Today's Dose: 3600  Total Dose for Breast: 6040  Today's Fraction/Total Fraction Breast:   Drainage: 0: Absent                                            Sexuality Alteration                 Emotional Alteration Copin: Effective  Comfort Alteration KPS: 90% Can perform normal activity, minor signs of disease  Fatigue (ONS scale) : 6: Moderate Fatigue  Pain Location: denies  Hot Flashes and/or Flushes: 1: Mild or no more than 1 per day   Nutrition Alteration Anorexia: 0: None  Nausea: 0: None  Vomitin: None  Skin Alteration Skin Sensation: 0: No problem  Skin Reaction: 0: None  AUA Assessment                                  Accompanied by       Objective:     Exam:     Vitals:    18 1025   BP: (!) 135/98   Pulse:  91   Temp: 98.1  F (36.7  C)   TempSrc: Oral   SpO2: 97%   Weight: 153 lb 4.8 oz (69.5 kg)       Wt Readings from Last 8 Encounters:   12/06/18 153 lb 4.8 oz (69.5 kg)   11/29/18 151 lb 4.8 oz (68.6 kg)   11/21/18 152 lb 3.2 oz (69 kg)   11/15/18 151 lb (68.5 kg)   11/12/18 155 lb 1.6 oz (70.4 kg)   11/08/18 156 lb 9.6 oz (71 kg)   10/31/18 156 lb (70.8 kg)   09/17/18 156 lb 8 oz (71 kg)       General: Alert and oriented, in no acute distress  Jody has mild Erythema.    Treatment Summary to Date    Aria chart and setup information reviewed    IJanine MD personally performed the services described in this documentation, as scribed by Harshad Agarwal in my presence, and it is both accurate and complete.    Signed by: Janine Barrera MD, MPH

## 2021-06-23 NOTE — PROGRESS NOTES
[facility] Radiation Oncology Follow Up Note    Patient: Jody Ch  MRN: [id number]  Date of Service: 02/04/2019    Assessment / Impression     1. Malignant neoplasm of upper-inner quadrant of right breast in female, 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)        Cancer Staging  No matching staging information was found for the patient.  ECOG Peformance Status  ECOG Performance Status: 1  Distress Assessment Score  Distress Assessment Score: 2  Interventions  Distress Assessment Intervention: Provider Notified  Body site: Breast     Plan:   1. Continue ROM exercises as she is really tight and stop ROM due to recent presumed pulmonary abscess.     2. Continue antibiotics and prescribed f/u per pulmonary   3 Return to myself in 3 months for reevaluation of ROM.   Keep appointment with Dr. Jaramillo for reinforcement of importance of ROM. And lyphmedema prevention.     Face to face time  25 minutes with > 75% spent on consultation, education and coordination of care.    Subjective:      HPI: Jody Ch is a 78 y.o. female   who was treated 3D conformal radiation therapy for right sided breast cancer, s/p right mastectomy and left mastectomy (2006).     The patient was originally diagnosed with left breast cancer in 2006, IDC grade II, DCIS, ER/KS pos, HER-2 neg. She subsequently underwent radical mastectomy and completed 5 years of Arimidex. No radiation at that time.     Her most recent screening mammogram in May 2018 showed an abnormality in the upper quadrant of the right breast. She subsequently underwent US left breast biopsy which showed IDC grade I, ER/KS neg. She then had right mastectomy with SLN biopsy on 6/1/2018, final pathology returned with 2.5 cm IDC grade II, lymphovascular invasion present, 4/13 LN pos with largest being 1.2 cm, HER-2 negative by FISH.      The patient has underwent PET CT without evidence of metastatic disease. A left thyroid nodule has been biopsied which was negative. CT  chest showing stable lung nodules. She has completed 4 cycles ddAC + Neulasta support chemotherapy. She started Taxol on 9/04/2018 and finished C4D1 on 10/16/2018. She required PT for ROM and lymphedema.     SITE TREATED: right CW  TOTAL DOSE: 6040  NUMBER OF FRACTIONS: 33  DATES COMPLETED: 11/08/2018 - 12/27/2018  CONCURRENT CHEMOTHERAPY: No  ADJUVANT THERAPY:Yes     She tolerated the treatment without unexpected side effects.     The patient presents today for routine office visit. Patient did develop a cough and was originally treated for pneumonia. She was then hospitalized on 1/21/2019 for worsening cough and fever, there was some concern for abscess rather than pneumonia and she was started on a long course of Augmentin. Since then has developed some back pain. Patient admits to getting behind on ROM exercises, otherwise doing well.    Current Outpatient Medications   Medication Sig Dispense Refill     acetaminophen (TYLENOL) 500 MG tablet Take 1,000 mg by mouth every 6 (six) hours as needed.       amoxicillin-clavulanate (AUGMENTIN) 875-125 mg per tablet Take 1 tablet by mouth.       azelastine (ASTELIN) 137 mcg (0.1 %) nasal spray 1 spray into each nostril.       benzonatate (TESSALON) 100 MG capsule Take 100 mg by mouth.       calcium carbonate (OS-PENNY) 500 mg calcium (1,250 mg) chewable tablet Chew 500 mg 3 (three) times a day.       cholecalciferol, vitamin D3, (VITAMIN D3) 400 unit cap Take 2,000 Units by mouth daily.       codeine-guaiFENesin (GUAIFENESIN AC)  mg/5 mL liquid Take 5 mL by mouth.       diphenhydrAMINE (BENADRYL) 25 mg tablet Take 12.5 mg by mouth at bedtime as needed.       escitalopram oxalate (LEXAPRO) 10 MG tablet Take 10 mg by mouth daily.       Current Facility-Administered Medications   Medication Dose Route Frequency Provider Last Rate Last Dose     heparin 100 unit/mL lockflush (PF) porcine 300-600 Units  300-600 Units Intravenous PRN Janine Barrera MD   600  Units at 10/31/18 1200       The following portions of the patient's history were reviewed and updated as appropriate: allergies, current medications, past family history, past medical history, past social history, past surgical history and problem list.    Review of Systems    General  Constitutional (WDL): Exceptions to WDL  Fatigue: Fatigue relieved by rest  EENT  Eye Disorder (WDL): Exceptions to WDL(wears glasses)  Dry Eye: Asymptomatic, clinical or diagnostic observations only, mild symptoms relieved by lubricants  Ear Disorder (WDL): All ear disorder elements are within defined limits  Respiratory       Respiratory (WDL): Exceptions to WDL  Cough: Mild symptoms, nonprescription intervention indicated  Cardiovascular  Cardiovascular (WDL): All cardiovascular elements are within defined limits  Endocrine     Gastrointestinal  Gastrointestinal (WDL): Exceptions to WDL  Anorexia: Loss of appetite without alteration in eating habits  Dysgeusia: Altered taste but no change in diet  Musculoskeletal  Musculoskeletal and Connetive Tissue Disorders (WDL): Exceptions to WDL  Arthralgia: Mild pain(back)  Integumentary               Integumentary (WDL): All integumentary elements are within defined limits  Neurological  Neurosensory (WDL): Exceptions to WDL  Peripheral Motor Neuropathy: Moderate symptoms, limiting instrumental ADL(legs, feet, tongue)  Ataxia: Asymptomatic, clinical or diagnostic observations only, intervention not indicated(occ with feet neuropathies)  Peripheral Sensory Neuropathy: Moderate symptoms, limiting instrumental ADL(legs, feet, tongue)  Psychological/Emotional   Patient Coping: Accepting  Hematological/Lymphatic  Lymph (WDL): All lymph disorder elements are within defined limits  Dermatologic     Genitourinary/Reproductive  Genitourinary (WDL): All genitourinary elements are within defined limits  Reproductive     Pain              Currently in Pain: Yes  Pain Score (Initial OR Reassessment):  5  Pain Frequency: Intermittent  Location: back   Pain Intervention(s): Home medication;Repositioned;Rest(advil)  Response to Interventions: some  AUA Assessment                                  Accompanied by  Accompanied by: Family Member      Objective:     Physical Exam    Vitals:    02/04/19 1030   BP: 163/68   Pulse: 95   Temp: 98.4  F (36.9  C)   TempSrc: Oral   SpO2: 94%   Weight: 151 lb 11.2 oz (68.8 kg)     Head: Normocephalic, without obvious abnormality, atraumatic  Lungs: Normal respiratory effort. Breath sounds clear throughout, no wheezing or rhonchi present.   Chest wall: Radiation skin reaction resolved.   Skin: Skin color, texture, turgor normal. No rashes or lesions  Extremities: No lymphedema, full ROM UE.   Neurologic: Grossly normal  Psych: affect normal, thought content appropriate.     Recent Labs: No results found for this or any previous visit (from the past 168 hour(s)).    Imaging: Imaging results 30 days: No results found.    I, Janine Barrera MD personally performed the services described in this documentation, as scribed by Harshad Agarwal in my presence, and it is both accurate and complete.    Signed by: Janine Barrera MD, MPH

## 2021-06-23 NOTE — PROGRESS NOTES
I met with Mikala and her  and presented her SCP. I reviewed the contents as well as the treatment summary in its' entirety. I offered support services here at  and she expressed interest in the [facility] program so I confirmed I would put her on the April list. I asked if she could look over the plan as I would like to call her for feedback in a week or so. She agreed. She was happy to have the plan, nice that it was all on one spot. I advised that it is only her treatment at  that she would likely get something from [facility] but she said she had not and that she is disappointed that her MO is there as she feels like the staff at  are so much nicer. I confirmed that we have MO's upstairs so easy to make a switch. I congratulated her on her survivorship and thanked her for her time. Everardo

## 2021-06-23 NOTE — PROGRESS NOTES
Patient here ambulatory accompanied by  for follow up s/p radiation for her breast cancer.  Patient states her skin has healed.  Has had problems with pneumonia in January 2019 and is still on antibiotics.  Seen by Dr. Barrera.  Plan RTC for follow up as directed by physician.

## 2021-06-23 NOTE — TELEPHONE ENCOUNTER
Patient called to reschedule her 1/28/19 follow-up w/Dr. Barrera.  It is rescheduled for 2/4/19 @ 10:30 am.  Patient also asked for information to be relayed to Dr. Barrera.  She stated that about a week after completion of her radiation therapy, she was hospitalitzed for the first time with pneumonia.  Then hospitalized for a second time with DVT.  She was discharged on 1/20/19 from [facility].  All records are viewable in Care Everywhere.  She asked for this message to be conveyed to Dr. Barrera.

## 2021-06-24 NOTE — PROGRESS NOTES
Pt completed radiotion in December. She worked with PT/OT; states that it helped a little' she has one session left; she is wondering if she needs to do it?. Pt was hospitalized x2 in las months due to Pneumonia; she is on ABX Augmentin 125 mg two times a day. She continues to wear compression sleeve. No current pain.

## 2021-06-24 NOTE — TELEPHONE ENCOUNTER
"I was able to speak to Mikala regarding her SCP. She felt the information was very useful and she was \"glad to have it.\". I asked about her care and she said, \"It was much better than [facility].... Much more personal.\" I thanked her for that. I reminded her of my contact information and congratulated her on her survivorship. Everardo  "

## 2021-06-24 NOTE — PROGRESS NOTES
Date Of Service: 02/18/2019    Date last Seen:  11/12/2018    PCP: Helga Olmedo MD    Impression:  1. Bilateral post mastectomy lymphedema  2. Right shoulder contracture with fibrosis and scarring improved  3. 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 Questions were answered.   present.  2. We discussed how to increase the stretch of the right shoulder and how it is important to do it regularly.  She was very open to learning.  3. She will continue her exercises.  She knows how to wear her compression appropriately.  4. Patient will follow up in 4 months or when needed.    Time spent with patient 15 minutes, with greater than 50% time in consultation, education and coordination of care, excluding procedures.  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 She underwent left modified radical mastectomy with 5 years of Arimidex.  No radiation was required.  In May 2018 she was diagnosed with grade 1 ER CA negative breast carcinoma treated with right mastectomy with lymph node dissection done with final pathology being a 2.5 cm grade 2 here to negative ER CA negative breast carcinoma.  PET scan showed no evidence of metastatic disease.  She had 4 cycles of Adriamycin and Cytoxan with Neulasta support.  She then had Taxol.  She completed her chemotherapy on 10/16/2018 and then started radiation.  She developed swelling and pain in the right wrist soon after the right mastectomy.  When I saw her in clinic she was having right arm and shoulder tightness with  the postmastectomy lymphedema.  Questions were answered.  I sent her to physical therapy for the swelling and tightness with significant work on the shoulder contracture.  We discussed the appropriate wearing of a compression sleeve and how to exercise safely.  She is now here for follow-up.  She attended therapy and feels the right shoulder is not as tight.  She was hospitalized for pneumonia, though there is concern for abscess or neoplasm.  There has been otherwise no new numbness, tingling or weakness.  There have been no new masses, rashes, or swellings of any other joints. There have been no infections.  There has been no new unexplained weight loss, loss of appetite, nausea and/or vomiting, shortness of breath, weight loss and chest pain.       Past Medical History:   Diagnosis Date     Anxiety state 1/31/2017     Atrophic vaginitis 5/8/2014    Overview:  UPDATE: Followed-up with Dr. Cassie Arteaga on 5-8-14. Noted that patient did not want to pay for the estrogen cream. Impression female stress incontinence and atrophic vaginitis.     Bilateral leg paresthesia 5/13/2014    Overview:  US FRANCISCO W EXERCISE BILATERAL 5-14-14: IMPRESSION: RIGHT LOWER EXTREMITY: FRANCISCO at rest is normal with a normal response to exercise. These findings would not be consistent with symptoms of arterial claudication. LEFT LOWER EXTREMITY: FRANCISCO at rest is normal with a normal response to exercise. These findings would not be consistent with symptoms of arterial claudication. US VENOUS LOWER EXTREMITY LEFT 5-14-14: Left leg veins are negative for DVT. EMG 5-19-14: Borderline abnormal EMG due to low amplitude compound motor action potential in the motor nerves with normal conduction velocities. This could suggest early axonal polyneuropathy. Such changes can be seen in patient's with prediabetes.     Bone pain 7/24/2018     Breast cancer (H)      Breast cancer, stage 2, right (H) 5/16/2018    Overview:  Added automatically from request for  surgery [id number]     Chronic cough 2/12/2018    Overview:  XR CHEST 2 VIEWS PA AND LATERAL 12-4-17: Normal heart size and pulmonary vascularity. Tiny granulomas with some fibrosis in the right lung base. The left lung is clear. Slight deviation of the upper trachea from left to right presumably due to thyroid enlargement stable. No change from previous. No acute process is identified. No focal pulmonary infiltrates.     Chronic diarrhea 5/12/2017    Overview:  UPDATE: COLONOSCOPY DIAGNOSTIC 7-25-17: Aphthous ulcer of ileum. Diverticulosis of colon without diverticulitis. Pathology Results: NEOTERMINAL ILEUM,  BIOPSY: Nonspecific chronic active ileitis. No dysplasia or malignancy. COLON, RANDOM, BIOPSY: Normal colonic mucosa STOOL TESTS on 5-12-17 for culture, Clostridium dificile toxin, WBC, Giardia antigen, Campylobacter, Shiga toxin, Stool for ova and parasite were negative       Chronic pain of right knee 10/23/2017    Overview:  XR KNEE 3 VIEWS RIGHT 10-23-17: Negative knee. No fracture or dislocation. No joint effusion.     Chronic rhinitis 6/8/2017     Chronic right hip pain 10/23/2017    Overview:  XR HIP 2 OR 3 VIEWS W PELVIS RIGHT 10-23-17: Arthritic change right hip. Pelvis otherwise negative.     Decreased range of motion of right shoulder 10/8/2018     Diarrhea 7/24/2018     Difficult or painful urination 12/8/2009    Overview:  UPDATE: Followed-up with Dr. Cassie Arteaga on 5-8-14. Noted that patient did not want to pay for the estrogen cream. Impression female stress incontinence and atrophic vaginitis. Exacerbation of her dysuria symptoms. She has not used her estrace vaginal cream due to cost noted 9-10-13. Urinalysis negative on 9-10-13. Urinalysis and urine microscopy showed some signs of urinary tract infection 9-12-13 . Prescription for ciprofloxacin 250 mg twice daily for 7 days sent to pharmacy. Followed-up with Dr. Bayron eSymour 11-5-13. Rx ciprofloxacin 500 mg twice daily (#30 tablets) so she can  self-treat as needed for signs of UTI. Estrace not covered by insurance and so not refilled. Consider Premarin cream.  Dysuria 12-8-09 with negative UA and urine culture. Dysuria 9-28-10 with negative UA and urine microscopy. Advised urology consult 12-8-09 and 9-28-10 if problem recurrent. Advised Pyridium prn on 9-28-10.Previous biofeedback treatment for urge incontinence, urinary frequency and dysuria. Followed-up M     Drug-induced nausea and vomiting 7/17/2018     Dry mouth 5/24/2016     Gallbladder polyp 4/14/2012    Overview:  UPDATE:US ABDOMEN LIMITED RUQ 4-18-16: 5 mm stone versus polyp in the gallbladder, decreased in size since comparison study where measured 7 mm. The gallbladder is contracted despite being NPO, which limits evaluation. Benign parapelvic cyst in the lower pole of the right kidney measuring 2.8 cm, is unchanged. US ABDOMEN LIMITED RUQ on 2-3-14:  Stable appearance of a 8 mm cholesterol polyp within the medial wall of the gallbladder. Incidentally noted is a small 7 mm hyperechoic focus within the right hepatic lobe. This likely reflects an incidental small cavernous hemangioma. US ABDOMEN LIMITED 12-3-12: Stable appearance of a 7 mm cholesterol polyp within the gallbladder.  CT ABD/PELVIS W/WO IV CONTRAST 3-23-12: Bilateral renal parapelvic cysts. Urinary bladder appears normal. Atrophic uterus and right adnexa. Either bilobed cyst or 2 separate cysts on atrophic left adnexa. Nodular foci in gallbladder suggestive of stones or polyps. Recommend gallbladder sonogram. US ABDOMEN LIMITED 4-6-12: Mul     Ganglion cyst of flexor tendon sheath of finger of right hand 5/24/2016     History of breast cancer 11/21/2006    Overview:  UPDATE: XR MAMMO UNI SCREEN FFDM RIGHT 4-14-14: ACR 1 Negative. Followed-up with oncologist Dr. Phyllis Colon on 4-29-14. Stable.    XR MAMMO UNI SCREEN FFDM RIGHT: 4-4-12, 4-5-13: ACR 2 Benign Finding. Followed-up with Dr. Phyllis Colon 5-23-13. CBC and CMP normal  except for low glucose of 55 . CA 27-29 normal.   Followed-up with Dr. Phyllis Colon 11-16-12. CBC and CMP normal except for low glucose of 67 . CA 27-29 normal. stage I infiltrating ductal carcinoma, s/p left radical mastectomy, T1c N0 M0, ER/MD (+), on Arimedex started 2-2007 and needed for 5 years (until 2-2012). Oncologist [facility] (Dr. Louise Burns). Follow-up oncologist 11-9-11. Discontinue Arimidex in  per patient request due to muscle ache. Discontinue Fosamax once current prescription is done as bone density should improve once off Arimidex. .     Hypercholesteremia 12/8/2014    Overview:  UPDATE: Rx atorvastatin (Lipitor) 1-22-15. She sent medical message on 3-18-15 requesting to take atorvastatin (Lipitor) 20 mg tablet every other day. This would not work for atorvastatin (Lipitor) . I advised to take half tablet (10 mg) daily rather than taking one tablet every other day. ASCVD Risk  10 year risk 7.9 % calculated on 12/8/2014.  Recommendation moderate to high - intensity statin.      IC (interstitial cystitis) 12/2/2010    Overview:  UPDATE: UPDATE: Followed-up with Dr. Cassie Arteaga on 5-8-14. Noted that patient did not want to pay for the estrogen cream. Impression female stress incontinence and atrophic vaginitis. Exacerbation of her dysuria symptoms. She has not used her estrace vaginal cream due to cost noted 9-10-13. Urinalysis negative on 9-10-13. Urinalysis and urine microscopy showed some signs of urinary tract infection 9-12-13 . Prescription for ciprofloxacin 250 mg twice daily for 7 days sent to pharmacy. Followed-up with Dr. Bayron Seymour 11-5-13. Rx ciprofloxacin 500 mg twice daily (#30 tablets) so she can self-treat as needed for signs of UTI. Estrace not covered by insurance and so not refilled. Consider Premarin cream.  Previous biofeedback treatment for urge incontinence, urinary frequency and dysuria. Followed-up [facility] since 12-2-10 for interstitial cystitis.  Work-up by urologist, Dr. Cassie Arteaga, since 2-27-12 regarding urinary urge incontinence and chronic interstitial cystitis. Rx estrace vaginal c     Ileitis 7/25/2017    Overview:  COLONOSCOPY DIAGNOSTIC 7-25-17: Aphthous ulcer of ileum. Diverticulosis of colon without diverticulitis. Pathology Results: NEOTERMINAL ILEUM,  BIOPSY: Nonspecific chronic active ileitis. No dysplasia or malignancy. COLON, RANDOM, BIOPSY: Normal colonic mucosa . Advised to follow-up with [facility] regarding chronic active ileitis.     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     Left thyroid nodule 4/30/2018     Lymphedema of extremity 10/8/2018     Major depression, recurrent, full remission (H) 1/31/2017    Overview:  Inpatient treatment for depression after divorce in 1983. Major depression diagnosed due to financial worries diagnosed 11-23-10 by oncologist, Louise Burns. Citalopram 20 mg daily started. Citalopram discontinued on 5-19-11 per patient request.     Malignant neoplasm of upper-inner quadrant of right breast in female, 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) 10/8/2018     Mixed stress and urge urinary incontinence 7/24/2006    Overview:  UPDATE: Followed-up with Dr. Cassie Arteaga on 5-8-14. Noted that patient did not want to pay for the estrogen cream. Impression female stress incontinence and atrophic vaginitis.  Exacerbation of her dysuria symptoms. She has not used her estrace vaginal cream due to cost noted 9-10-13. Urinalysis negative on 9-10-13. Urinalysis and urine microscopy showed some signs of urinary tract infection 9-12-13 . Prescription for ciprofloxacin 250 mg twice daily for 7 days sent to pharmacy. Followed-up with Dr. Bayron Seymour 11-5-13. Rx ciprofloxacin 500 mg twice daily (#30 tablets) so she can self-treat as needed for signs of UTI. Estrace  not covered by insurance and so not refilled. Consider Premarin cream. Previous biofeedback treatment for urge incontinence, urinary frequency and dysuria. Followed-up [facility] since 12-2-10 for interstitial cystitis. Work-up by urologist, Dr. Cassie Arteaga, since 2-27-12 regarding urinary urge incontinence and chronic interstitial cystitis. Rx estrace vaginal cream wit     Nasal congestion 6/5/2014     Osteopenia 11/1/2006    Overview:  UPDATE:  XR DXA BONE DENSITY 2 SITES AXIAL 5/16/2018: Osteopenia. Lumbar Spine L1-L4 T-score -1.8 . Mean Bilateral Femoral Neck T-score -1.4 . FRAX Results: 10-year probability of major osteoporotic fracture is 10.8%, and of hip fracture is 2%, based on left femoral neck BMD. Basic bone health recommended.  XR DXA BONE DENSITY 2 SITES AXIAL 4-18-16: T score AP spine -2.1, Left femoral neck -1.1, R femoral neck -1.5. FRAX major osteoporotic fracture 11% and FRAX hip fracture score 2.0 %. Basic bone health recommended.  DEXA scan 4-14-14 T score -2.1 AP spine, -1.4 left femoral neck and -1.5 on right femoral neck. FRAX score not calculated. Bone density test stable. Recheck 4-2016. DEXA scan 4-4-12 T score -2.1 AP spine, -1.3 left femoral neck and -1.2 on right femoral neck. FRAX major osteoporotic score 9.6% and FRXA hip fracture score 1.3%. Recheck 4-2014 DEXA scan 3-20-08 T score -2.2 spine, -1.0 left femoral neck, -1.2 right femoral neck. DEXA 3-2-10: T score -1.7 AP spine, -1.0 L femoral ne     Other emphysema (H) 4/30/2018     Other specified disorders of nose and nasal sinuses 7/24/2018     Peripheral axonal neuropathy 11/22/2011    Overview:  UPDATE:  She thinks gabapentin (Neurontin) has been helpful without side effect of leg swelling discussed on 11-20-14. She agreed to increase dose to 300 mg at bedtime. She called on 10-30-14 requesting for gabapentin (Neurontin) as nortriptyline not helpful. Reminded her that she complained of leg swelling with gabapentin (Neurontin)  in the past.  EMG 5-19-14: Borderline abnormal EMG due to low amplitude compound motor action potential in the motor nerves with normal conduction velocities. This could suggest early axonal polyneuropathy. Such changes can be seen in patient's with prediabetes. Neurology follow-up on 4-2-13. Advised to give nortriptyline 25 mg daily another try. If unable to tolerate, consider duloxetine (Cymbalta) . Neurology consult Dr. Luther Armenta on 3-26-12. Impression essential tremor. MRI brain. Neurontin 100 mg bid started to help tremors and bilateral lower extremity paresthesia thought due to impaired FG or prediabetes. Normal EMG of lower extremity on 4-11-12 but comp     Personal history of tobacco use, presenting hazards to health 10/22/2018    Overview:  20 pack years     Pharyngeal dysphagia 12/8/2009     Recurrent UTI 9/12/2013    Overview:  UPDATE: Followed-up with Dr. Cassie Arteaga on 5-8-14. Noted that patient did not want to pay for the estrogen cream. Impression female stress incontinence and atrophic vaginitis.  Exacerbation of her dysuria symptoms. She has not used her estrace vaginal cream due to cost noted 9-10-13. Urinalysis negative on 9-10-13. Urinalysis and urine microscopy showed some signs of urinary tract infection 9-12-13 . Prescription for ciprofloxacin 250 mg twice daily for 7 days sent to pharmacy. Followed-up with Dr. Bayron Seymour 11-5-13. Rx ciprofloxacin 500 mg twice daily (#30 tablets) so she can self-treat as needed for signs of UTI. Estrace not covered by insurance and so not refilled. Consider Premarin cream.  Recheck Urinalysis and urine microscopy showed some signs of urinary tract infection 9-12-13 . Prescription for ciprofloxacin 250 mg twice daily for 7 days sent to pharmacy.     Salivation excessive 12/17/2008    Overview:  may be due to postnasal drip causing excessive salivation as she tends to swallow the postnasal drainage.She is not candidate for tricyclic antidepressant like  nortriptyline (can cause dry mouth) to lessen salivary secretions as this would excerbate her dry lips.     SOB (shortness of breath) 5/13/2014    Overview:  XR CHEST 2 VIEWS PA AND LATERAL 5-13-14: Impression: On the lateral view, a small patchy opacity is again visualized and has a few linear markings. This may be chronic, it is suggested on the prior exam slightly more prominent but this is probably due to technique, could represent chronic areas of subsegmental volume loss or previous consolidation. It is not well visualized on the PA view. Overall heart size and pulmonary vascular are normal. No pleural abnormalities. Stable appearance of somewhat confluent markings in the right apex as well as superimposition with the right 1st costochondral junction. No additional areas of significant consolidation.     Tremor, essential 11/22/2011    Overview:  Neurology consult Dr. Luther Armenta on 3-26-12. Impression essential tremor. MRI brain. Neurontin 100 mg bid started to help tremors and bilateral lower extremity paresthesia thought due to impaired FG or prediabetes. Normal EMG of lower extremity on 4-11-12 but compound motor action potentials low which can be seen in early axonal neuropathy. Developed swelling with gabapentin (Neurontin) . MR SPINE LUMBAR WITHOUT 8-17-12: L5-S1 marked charley. facet arthrosis causing degenerative grade I L5 anterolisthesis but causing only mild up-down foraminal stenosis and no compression of adjacent nerves. Tapering disc degeneration. No fracture, neoplasm or infection. US ANKLE BRACHIAL INDEX  8-17-12: mild athrosclerotic disease both lower extremities. Comprehensive lab tests ordered 9-17-12 by Dr. Armenta regarding neuropathy. Rx changed to nortriptyline which was increased to 50 mg daily at bedtime on 9-17-12. Impression was likely axonal neuropathy due to prediabetes.     Vitamin D insufficiency 12/3/2012    Overview:  Vitamin D was low at 28.1 on 12-3-12. Take vitamin D 3000 units  daily for 8 weeks, then take vitamin D 2000 units indefinitely. Vitamin D was normal at 40.8 on 3-6-13. Continue vitamin D 2000 units indefinitely.     Vitreous degeneration 10/22/2018    Overview:  Right eye       Past Surgical History:   Procedure Laterality Date     APPENDECTOMY       BLEPHAROPLASTY Bilateral 11/07/2013     BREAST LUMPECTOMY  11/21/2006    Infiltrating ductal carcinoma, micropapillary variant, Janey     FINGER GANGLION CYST EXCISION Right 06/23/2016    right ring finger     MASTECTOMY MODIFIED RADICAL Left 12/06/2006     RI REMOVE EYELID LESN (NOT CHALAZION) Right 11/07/2013     RIGHT SIMPLE MASTECTOMY WITH RIGHT SENTINAL LYMPH NODE STTL8IL AND RIGHT AXILLARY NODE DISSECTION Right 06/01/2018     US BIOPSY BREAST NEEDLE W BIBIANA W GUIDE RIGHT Right 05/18/2018         Current Outpatient Medications:      acetaminophen (TYLENOL) 500 MG tablet, Take 1,000 mg by mouth every 6 (six) hours as needed., Disp: , Rfl:      amoxicillin-clavulanate (AUGMENTIN) 875-125 mg per tablet, Take 1 tablet by mouth., Disp: , Rfl:      calcium carbonate (OS-PENNY) 500 mg calcium (1,250 mg) chewable tablet, Chew 500 mg 3 (three) times a day., Disp: , Rfl:      cholecalciferol, vitamin D3, (VITAMIN D3) 400 unit cap, Take 2,000 Units by mouth daily., Disp: , Rfl:      codeine-guaiFENesin (GUAIFENESIN AC)  mg/5 mL liquid, Take 5 mL by mouth., Disp: , Rfl:      diphenhydrAMINE (BENADRYL) 25 mg tablet, Take 12.5 mg by mouth at bedtime as needed., Disp: , Rfl:      escitalopram oxalate (LEXAPRO) 10 MG tablet, Take 10 mg by mouth daily., Disp: , Rfl:      azelastine (ASTELIN) 137 mcg (0.1 %) nasal spray, 1 spray into each nostril., Disp: , Rfl:      benzonatate (TESSALON) 100 MG capsule, Take 100 mg by mouth., Disp: , Rfl:     Current Facility-Administered Medications:      heparin 100 unit/mL lockflush (PF) porcine 300-600 Units, 300-600 Units, Intravenous, PRN, Janine Barrera MD, 600 Units at 10/31/18  1200    Allergies   Allergen Reactions     Gabapentin Swelling     Latex Other (See Comments)     Sulfa (Sulfonamide Antibiotics) Other (See Comments)     Tetracycline Other (See Comments)       Social History     Socioeconomic History     Marital status:      Spouse name: Not on file     Number of children: Not on file     Years of education: Not on file     Highest education level: Not on file   Social Needs     Financial resource strain: Not on file     Food insecurity - worry: Not on file     Food insecurity - inability: Not on file     Transportation needs - medical: Not on file     Transportation needs - non-medical: Not on file   Occupational History     Not on file   Tobacco Use     Smoking status: Former Smoker     Packs/day: 0.50     Years: 40.00     Pack years: 20.00     Types: Cigarettes     Last attempt to quit: 2005     Years since quittin.0     Smokeless tobacco: Never Used   Substance and Sexual Activity     Alcohol use: No     Comment: occassionaly      Drug use: No     Sexual activity: Not on file   Other Topics Concern     Not on file   Social History Narrative     Not on file       Family History   Problem Relation Age of Onset     Diabetes Brother      Hypertension Brother      Prostate cancer Father      Kidney disease Father         One kidney does not function     Seizures Father      Cancer Maternal Grandmother         bladder cancer     Arthritis Maternal Grandmother      Hypertension Maternal Grandmother      Osteoporosis Maternal Grandmother      Alcohol abuse Mother      Drug abuse Mother      Ulcers Mother      Hypertension Mother      Diabetes Brother      Hypertension Brother      Allergic rhinitis Sister      Asthma Sister      Breast cancer Maternal cousin         DCIS       Review of Systems:  Review of systems is otherwise negative, except as noted above.  Full 12 point review of systems was completed.    Imaging:  I personally reviewed the following imaging today  and those on care everywhere, if indicated    [facility] MEDICAL IMAGING  US VENOUS LOWER EXTREMITY BILATERAL  1/22/2019 4:15 PM    INDICATION: Pulmonary embolism. Assess for lower extremity deep vein thrombosis.  TECHNIQUE: Routine exam with compression, augmentation, and duplex utilizing 2D  gray-scale imaging, Doppler interrogation with color-flow and spectral waveform  analysis.  COMPARISON: CTA chest 01/21/2019    FINDINGS: The common femoral, femoral, popliteal, and segmentally visualized  calf veins were evaluated.    Right leg veins are negative for deep venous thrombosis.  Left leg veins are negative for deep venous thrombosis.    No popliteal cysts.    CONCLUSION:  1.  Bilateral leg veins are negative for DVT.    [facility] RADIOLOGY    EXAM: CT CHEST PE STUDY  LOCATION: [facility] MEDICAL IMAGING  DATE/TIME: 1/21/2019 8:38 PM    INDICATION: Cough. Recurrent pneumonia. History of breast cancer. Radiation  therapy.  COMPARISON: CT chest, abdomen and pelvis 06/13/2018  TECHNIQUE: Helical acquisition through the chest was performed during the  arterial phase of contrast enhancement using IV contrast. 2D and 3D  reconstructions were performed by the CT technologist. Dose reduction techniques  were used.   IV CONTRAST: Omnipaque 350 70 mL    FINDINGS:  ANGIOGRAM CHEST: Tiny embolus within the proximal segmental branch vessel to the  right lower lobe on image 100 of series 5. No evidence for emboli to the left  lung.    RV/LV RATIO: Greater than 1.1 indicating right heart strain.    LUNGS AND PLEURA: Masslike consolidation within the right middle lobe extending  to the right hilum, new from the prior exam, with a few tiny air pockets within  the consolidated lung. 4 mm nodule medial aspect right lower lobe on image 119  is unchanged. The left lung is clear. No effusions. Anterior left chest wall  Port-A-Cath tip at the cavoatrial junction.    MEDIASTINUM: No mediastinal or hilar adenopathy. Coronary artery  calcifications.  Atherosclerotic disease thoracic aorta. 4.8 x 3.7 cm heterogeneous left thyroid  lobe mass is unchanged. Small esophageal hiatal hernia.    LIMITED UPPER ABDOMEN: Negative.    MUSCULOSKELETAL: Mild degenerative changes thoracic spine.    CONCLUSION:  1.  Tiny embolus segmental branch vessel to the right lower lobe.  2.  Extensive masslike consolidation within the right middle lobe may represent  pneumonia. Follow-up exam after therapy recommended to ensure resolution and  exclude neoplasm.  3.  No change large left thyroid lobe mass.  4.  Results discussed with Dr. David on 01/21/2019 at 10:40 PM.    Labs:  I personally reviewed the following labs today and those on care everywhere, if indicated    No results found for: SEDRATE      No results found for: CRP        Lab Results   Component Value Date    CREATININE 0.69 10/31/2018      1/21/19 Glu 117  Cr 0.78  BUN 13  Alb 3.6  1/24/19 Hgb 11.3    Physical Exam:  Vitals:    02/18/19 1016   BP: 118/72   Pulse: 92   Resp: 20   Temp: 98.5  F (36.9  C)     BMI 27.65     Circumferential measures:    Vasc Edema 11/12/2018 2/18/2019   Right-just above MCP 18.8 19.5   Right Wrist 16.5 16   Right Up 10cm 23 23.5   Right Up 10cm From Elbow 33.4 32.2   Left-just above MCP 18.5 19.8   Left Wrist 16.2 15.8   Left Up 10cm 22.8 22.8   Left Up 10cm From Elbow 34.5 33       General:  78 y.o. female in no apparent distress.      Psych: Alert and oriented x 3.  Cooperative. Affect normal.    HEENT: Atraumatic, normocephalic.  Alopecia.    Range of Motion:  Range of motion of shoulders, elbows, wrists is normal bilaterally without active joint synovitis, erythema, joint swelling, crepitus or joint laxity except for tightness in the anterior axilla on the right shoulder at end-stage forward flexion and abduction. This has decreased.   Slight cording is noted.     Sensation: Intact to pinprick and light touch throughout the upper extremities bilaterally.       Strength:  Normal strength testing in shoulder abduction, elbow flexion, elbow extension, wrist extension, forearm supination, forearm pronation, hand intrinsics, internal rotation and external rotation of the shoulder shoulders bilaterally.      Lymph nodes: No cervical, supraclavicular, infraclavicular, or axillary lymphadenopathy palpated.    Vascular: No unusual venous distention.  Radial arterial pulses strong and equal at the wrists bilaterally.     Skin: No unusual rubor, calor, masses or rashes along the skin in either arm.  No significant fibrosity or scarring.  No pain to palpation.       Preeti Jaramillo MD, ABWMS, FACCWS, [facility]  Medical Director Wound Care and Lymphedema  [facility]  [phone]

## 2021-08-08 ENCOUNTER — HEALTH MAINTENANCE LETTER (OUTPATIENT)
Age: 81
End: 2021-08-08

## 2021-08-23 ENCOUNTER — TELEPHONE (OUTPATIENT)
Dept: VASCULAR SURGERY | Facility: CLINIC | Age: 81
End: 2021-08-23

## 2021-08-23 DIAGNOSIS — Z85.3 HISTORY OF LEFT BREAST CANCER: ICD-10-CM

## 2021-08-23 DIAGNOSIS — Z17.1 MALIGNANT NEOPLASM OF UPPER-INNER QUADRANT OF RIGHT BREAST IN FEMALE, ESTROGEN RECEPTOR NEGATIVE (H): ICD-10-CM

## 2021-08-23 DIAGNOSIS — C50.211 MALIGNANT NEOPLASM OF UPPER-INNER QUADRANT OF RIGHT BREAST IN FEMALE, ESTROGEN RECEPTOR NEGATIVE (H): ICD-10-CM

## 2021-08-23 DIAGNOSIS — I97.2 POST-MASTECTOMY LYMPHEDEMA SYNDROME: Primary | ICD-10-CM

## 2021-08-23 NOTE — TELEPHONE ENCOUNTER
Patient requesting orders for compression sleeve, she would like this sent to [facility]  Patient would like a call when orders have been sent: [phone]  Patient was provided with  [facility] ph#: [phone]

## 2021-08-26 ENCOUNTER — TELEPHONE (OUTPATIENT)
Dept: ORTHOPEDICS | Facility: CLINIC | Age: 81
End: 2021-08-26

## 2021-08-26 NOTE — TELEPHONE ENCOUNTER
Jody was called in regards to Dr. Jaramillo's referral for a compression arm sleeve. Jody set up an evaluation for next Thursday in [place] on 9/2.

## 2021-09-02 ENCOUNTER — DOCUMENTATION ONLY (OUTPATIENT)
Dept: ORTHOPEDICS | Facility: CLINIC | Age: 81
End: 2021-09-02

## 2021-09-02 NOTE — PROGRESS NOTES
S: Patient was seen in [place] to be measured for a new Medi [place] arm sleeve with an attached gauntlet 20-30 mmHg like she has been wearing. RX on-file is current from Dr. Jaramillo for both arms but Mikala denies wanting a sleeve for her left arm, just her right side.    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 Observations show there is swelling present from lymphatic fluid. The expected outcome with compliant use is to reduce swelling and control the patient s condition.     A: New measurements were taken today of Mikala's right arm. She still sizes into a 3 which is what she has been wearing. This size was pulled from the  stock supply in [place]. Mikala insisted that her insurance covered last time and that she would like me to call to check coverage. [employer] was called and they follow medicare guidelines so will deny coverage. Mikala stated that she got her sleeve two years ago from [facility] and it was covered by insurance. In [employer] it shows that the sleeve with an attached gauntlet was coded incorrectly as  and was denied by Medicare on 08/08/2018. Mikala will be called to collect payment over the phone to have the sleeve shipped out to her home address as verified today during the appnt.    P: Mikala will be called to collect payment and take delivery of the item to ship out.

## 2021-09-07 ENCOUNTER — DOCUMENTATION ONLY (OUTPATIENT)
Dept: ORTHOPEDICS | Facility: CLINIC | Age: 81
End: 2021-09-07

## 2021-09-07 NOTE — PROGRESS NOTES
S: Mikala was called on 9/7 and informed that her sleeve would not be covered by insurance. RX on-file is current form her ordering provider.    A: Assessment was last week in-person. Mikala provided a CC # to pay OOP with the 20% discount for the Medi [place] arm sleeve with attached gauntlet 20-30 mmHg. Item was packaged up to ship to Mikala's home address. Mikala has been wearing this garment and is familiar with the use and care, as well as how it should fit and feel while donning.    P: Mikala is to call with any further needs.  Goal is to maintain a home program.

## 2021-09-30 ENCOUNTER — IMMUNIZATION (OUTPATIENT)
Dept: NURSING | Facility: CLINIC | Age: 81
End: 2021-09-30
Payer: MEDICARE

## 2021-09-30 PROCEDURE — 0004A PR COVID VAC PFIZER DIL RECON 30 MCG/0.3 ML IM: CPT

## 2021-09-30 PROCEDURE — 91300 PR COVID VAC PFIZER DIL RECON 30 MCG/0.3 ML IM: CPT

## 2022-03-30 ENCOUNTER — HOSPITAL ENCOUNTER (OUTPATIENT)
Dept: CT IMAGING | Facility: CLINIC | Age: 82
Discharge: HOME | End: 2022-03-30
Attending: INTERNAL MEDICINE | Admitting: INTERNAL MEDICINE

## 2022-03-30 DIAGNOSIS — Z13.9 ENCOUNTER FOR SCREENING: ICD-10-CM

## 2022-03-30 LAB
CV CALCIUM SCORE AGATSTON LM: 0
CV CALCIUM SCORING AGATSON LAD: 67
CV CALCIUM SCORING AGATSTON CX: 3
CV CALCIUM SCORING AGATSTON RCA: 7
CV CALCIUM SCORING AGATSTON TOTAL: 77

## 2022-03-30 PROCEDURE — 75571 CT HRT W/O DYE W/CA TEST: CPT

## 2022-03-30 PROCEDURE — 75571 CT HRT W/O DYE W/CA TEST: CPT | Mod: 26 | Performed by: INTERNAL MEDICINE

## 2022-09-11 ENCOUNTER — HEALTH MAINTENANCE LETTER (OUTPATIENT)
Age: 82
End: 2022-09-11

## 2022-10-04 ENCOUNTER — TRANSFERRED RECORDS (OUTPATIENT)
Dept: MULTI SPECIALTY CLINIC | Facility: CLINIC | Age: 82
End: 2022-10-04

## 2023-01-22 ENCOUNTER — HEALTH MAINTENANCE LETTER (OUTPATIENT)
Age: 83
End: 2023-01-22

## 2023-05-19 ENCOUNTER — TRANSCRIBE ORDERS (OUTPATIENT)
Dept: OTHER | Age: 83
End: 2023-05-19

## 2023-05-19 DIAGNOSIS — H53.2 TRANSIENT DIPLOPIA: Primary | ICD-10-CM

## 2023-06-06 ENCOUNTER — TELEPHONE (OUTPATIENT)
Dept: OPHTHALMOLOGY | Facility: CLINIC | Age: 83
End: 2023-06-06
Payer: MEDICARE

## 2023-06-06 NOTE — TELEPHONE ENCOUNTER
[facility] Call Center    Phone Message    May a detailed message be left on voicemail: yes     Reason for Call: Other: pt requesting a call back to get a code for her upcoming appt with Dr Fong please call pt to discuss thank you! Cell phone in chart please    Action Taken: Message routed to:  [facility] eye    Travel Screening: Not Applicable

## 2023-06-09 ENCOUNTER — TELEPHONE (OUTPATIENT)
Dept: OPHTHALMOLOGY | Facility: CLINIC | Age: 83
End: 2023-06-09
Payer: MEDICARE

## 2023-06-09 NOTE — TELEPHONE ENCOUNTER
[facility] Call Center    Phone Message    May a detailed message be left on voicemail: yes     Reason for Call: Other: Patient states she really needs the ICD code, so she can plan for surgery. Please call and ok to leave a detailed message.       Action Taken: Message routed to:  [facility] Ophthalmology    Travel Screening: Not Applicable

## 2023-06-09 NOTE — TELEPHONE ENCOUNTER
Called and lvm explaining DR. Fong is not a surgeon and her appointment is just a consult .     Provided clinic number if she has questions     Sharda Medina Communication Facilitator on 6/9/2023 at 12:52 PM

## 2023-06-09 NOTE — TELEPHONE ENCOUNTER
Called and Left a voicemail     Mikala is coming in for a consult and does not need a surgery code as Dr. Fong does not due surgery.     We will give her the code at her consult if surgery is recommend    Sharda Medina Communication Facilitator on 6/9/2023 at 1:32 PM

## 2023-06-25 PROBLEM — K21.9 GERD (GASTROESOPHAGEAL REFLUX DISEASE): Status: ACTIVE | Noted: 2023-06-25

## 2023-06-25 PROBLEM — M81.0 OSTEOPOROSIS, SENILE: Status: ACTIVE | Noted: 2020-09-10

## 2023-06-25 PROBLEM — E53.8 VITAMIN B12 DEFICIENCY: Status: ACTIVE | Noted: 2019-07-22

## 2023-08-05 NOTE — PROGRESS NOTES
"       Jody Ch is a pleasant 82 year old White female who presents to my neuro-ophthalmology clinic today having been referred by Dr. Shell for intermittent binocular diplopia.         History of Present Illness   HPI:  Per chart review, patient was seen by Dr. Shell on 02/16/23 for symptoms of double vision. She was seen back on 05/16/23 for continued symptoms of intermittent double vision.    She has had 3 episodes in the past three years. All of a sudden her vision goes \"goofy\" which she thinks is double. She thinks it is double vision because the images do not connect. She shuts both eyes to make it go away. The longest it has occurred for is one minute or less. The first time it occurred she tried closing each eye individually which improved the vision. The second and third time when she closed each eye individually there was blurry/\"double\" vision in each eye. The most recent episode was Spring 2023. Denies headache, scalp tenderness, jaw claudication, weight loss, ptosis. She does have dysphagia on liquids since she was hospitalized with bacterial pneumonia.    She had cataract surgery 2-3 years ago and has struggled with dry eye since surgery. Her first episode of double vision occurred after cataract surgery.    She has not tried using eye drops to see if this improves her symptoms. She has crusting of eyelids in the morning and uses Systane artificial tears before bed. Her eyes are always burning. She does not do warm compresses.    Independent historians:  Patient      Review of outside testing:  None relevant      Data   Review of outside clinical notes:    Optometry - Dr. Shell - 05/16/23  HPI: Jody Crawford" is a 82 y.o. female She is here for follow up on intermittent diplopia.   It only occurred once since I last saw her. It was about 5 minutes. She got a warm washcloth and closed her eyes; it lasted a little longer than the first few episodes. Binocular diplopa; she closes one of " "her eyes to resolve the double vision. Horizontal in orientation.   She was initially seen by me on 2/16/23 for evaluation of double vision. At that time, she had experienced it a couple times.   Eyes itch a lot; in the am feels like is stuff in the eyes.   She is having a hard time with the reading glasses. Which are +2.25; she has PALs.     ASSESSMENT  ICD-10-CM   1. Transient diplopia H53.2 AMB CONSULT TO OPTOMETRY/OPHTHALMOLOGY   2. Tremor, essential G25.0   3. Dry eyes H04.123   4. Pseudophakia of both eyes Z96.1   5. Floater, vitreous, bilateral H43.393   6. Double vision H53.2     PLAN:  She has had intermittent binocular diplopia with no other symptoms intermittently over the past 3-4 months of unknown etiology. Recommend neuro ophthalmology evaluation/ work up.       Optometry - Dr. Shell - 02/16/23  HPI: Jody \"Mikala\" is a 82 y.o. female   Eyes are scratchy in the am. Uses Refresh at night.   Has gotten double vision a couple times. Does not last a long time. Happened a couple days ago. Reads a lot; has been under a lot of stress.   Often uses +2.25 or 2.00 for reading.   She has hx of cataract surgery with positive dysphotopsia, which she reports is better than it had been.   Sometimes she sees better without her glasses on.   Plan:   1. Spectacle prescription was provided and pt was advised on its use.  2. Patient Instructions   Orders Placed This Encounter     fluorometholone (FML) 0.1 % ophthalmic suspension   Sig: Place 1 Drop into both eyes four times daily. X 5 day, 3 times per day x 5 days, twice daily x 5 days and once daily x 5 days   Dispense: 10 mL   Refill: 0   PLAN for treatment of Dry Eye Condition:  Lid hygiene: Apply warm compresses to the closed eyelid for several minutes; then gently scrub or massage the eyelid margin. Do this twice daily  Artificial tears (over-the-counter eye drops): use them 2 to 4 times per day  3. Continue to wear reading glasses PRN  4. Monitor double vision. " follow up in 2-3 months.     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)     ACP (advance care planning)     Anxiety state     Atrophic vaginitis     Bilateral leg paresthesia     Bone pain     Breast cancer, stage 2, right (H)     Chronic cough     Chronic diarrhea     Chronic pain of right knee     Chronic rhinitis     Chronic right hip pain     Drug-induced nausea and vomiting     Dry mouth     Difficult or painful urination     Gallbladder polyp     Ganglion cyst of flexor tendon sheath of finger of right hand     History of left breast cancer     Hypercholesteremia     IC (interstitial cystitis)     Ileitis     Impaired fasting glucose     Insomnia secondary to depression with anxiety     Left leg swelling     Left thyroid nodule     Major depression, recurrent, full remission (H)     Mixed stress and urge urinary incontinence     Nasal congestion     Osteopenia     Other emphysema (H)     Diarrhea     Peripheral axonal neuropathy     Personal history of tobacco use, presenting hazards to health     Pharyngeal dysphagia     Other specified disorders of nose and nasal sinuses     Salivation excessive     SOB (shortness of breath)     Tremor, essential     Vitamin D insufficiency     Vitreous degeneration     Post-mastectomy lymphedema syndrome     Contracture of right shoulder     S/P bilateral cataract extraction     Recurrent aspiration pneumonia (H)     Other pulmonary embolism without acute cor pulmonale (H)     Oral phase dysphagia     ADAN (generalized anxiety disorder)     Depression, major, recurrent, mild (H)     Diverticulitis of gastrointestinal tract     GERD (gastroesophageal reflux disease)     Osteoporosis, senile     Vitamin B12 deficiency       Medications   Patient has a current medication list  which includes the following prescription(s): albuterol, alendronate, cholecalciferol, b-12, escitalopram, fluticasone-umeclidin-vilant, hypromellose, aspirin, atorvastatin calcium, and calcium carbonate.    Family History   Patient's family history includes Alcoholism in her mother; Allergic rhinitis in her sister; Arthritis in her maternal grandmother; Asthma in her sister; Breast Cancer in her maternal cousin; Cancer in her maternal grandmother; Diabetes in her brother and brother; Hypertension in her brother, brother, maternal grandmother, and mother; Kidney Disease in her father; Osteoporosis in her maternal grandmother; Prostate Cancer in her father; Seizure Disorder in her father; Substance Abuse in her mother; Thyroid Disease in her brother; Ulcers in her mother.     Social History   Patient  reports that she quit smoking about 18 years ago. Her smoking use included cigarettes. She has a 20.00 pack-year smoking history. She has never used smokeless tobacco. She reports that she does not drink alcohol and does not use drugs.     Physical Exam   Exam:  Visual acuity today is 20/20 in the right eye, 20/20 in the left eye. Color vision is 11 in the right eye, 11 in the left eye. Intraocular pressure is 18 in the right eye and 16 in the left eye.  Pupils are isocoric and normally reactive. Anterior segment exam today notable for decreased tear breakup time and meibomian gland dysfunction. Fundus exam today unremarkable.     Tests ordered and interpreted today:  Sensorimotor exam with full extraocular movements. Ortho in all gaze directions. She has intermittent monocular right double vision throughout exam.        Discussion of management / interpretation with another provider:  None      1. Monocular diplopia  2. Dry eyes    This patient developed a transient disturbance in her vision on 3 occasions within the past 3 years.  It is unclear whether she actually had double vision or not however she noted on 1  occasion that the vision improved with covering either eye.  On the other 2 occasions the vision did not improve with covering either eye.  She is not quite certain that she actually experienced double vision.  On today's visit, she endorsed double vision coming out of the right eye only.  She has struggled with dry eye ever since cataract surgery 3 years ago.  Certainly dry eye syndrome can cause monocular double vision and blurred vision intermittently. I asked the patient to use artificial tears as well as warm compresses to the eyelid margin  on a regular basis.  I asked the patient to take fish oil capsules 2x/day for a month and then 1x/d thereafter.   If that is not beneficial we could consider punctal plugs, corticosteroid eye drops and Restasis.  At this point, given how normal her eye exam appears and the significant duration of being symptom-free between episodes, I would defer any further work-up until we have more information.  Of asked her to clearly identify whether she has double vision or not.  We discussed that she could consider an evaluation with a cornea specialist regarding her dry eye.        Attending Physician Attestation:  Complete documentation of historical and exam elements from today's encounter can be found in the full encounter summary report (not reduplicated in this progress note).  I personally obtained the chief complaint(s) and history of present illness.  I confirmed and edited as necessary the review of systems, past medical/surgical history, family history, social history, and examination findings as documented by others; and I examined the patient myself.  I personally reviewed the relevant tests, images, and reports as documented above.  I formulated and edited as necessary the assessment and plan and discussed the findings and management plan with the patient and family. - Geo Taveras MD  PGY3 Ophthalmology    Precharting:  Geo Schulte,  MS4  [facility]

## 2023-08-14 ENCOUNTER — OFFICE VISIT (OUTPATIENT)
Dept: OPHTHALMOLOGY | Facility: CLINIC | Age: 83
End: 2023-08-14
Attending: OPTOMETRIST
Payer: MEDICARE

## 2023-08-14 DIAGNOSIS — H53.2 MONOCULAR DIPLOPIA: Primary | ICD-10-CM

## 2023-08-14 DIAGNOSIS — Z96.1 PSEUDOPHAKIA OF BOTH EYES: ICD-10-CM

## 2023-08-14 DIAGNOSIS — H04.123 DRY EYES, BILATERAL: ICD-10-CM

## 2023-08-14 DIAGNOSIS — H53.10 SUBJECTIVE VISUAL DISTURBANCE: Primary | ICD-10-CM

## 2023-08-14 PROCEDURE — 99203 OFFICE O/P NEW LOW 30 MIN: CPT | Mod: GC | Performed by: OPHTHALMOLOGY

## 2023-08-14 PROCEDURE — G0463 HOSPITAL OUTPT CLINIC VISIT: HCPCS | Performed by: OPHTHALMOLOGY

## 2023-08-14 RX ORDER — ALENDRONATE SODIUM 70 MG/1
70 TABLET ORAL
COMMUNITY
End: 2023-10-13

## 2023-08-14 RX ORDER — ALBUTEROL SULFATE 1.25 MG/3ML
1.25 SOLUTION RESPIRATORY (INHALATION) 2 TIMES DAILY PRN
COMMUNITY

## 2023-08-14 RX ORDER — ESCITALOPRAM OXALATE 10 MG/1
10 TABLET ORAL DAILY
COMMUNITY
End: 2023-10-13

## 2023-08-14 ASSESSMENT — CONF VISUAL FIELD
OD_INFERIOR_TEMPORAL_RESTRICTION: 0
OD_SUPERIOR_TEMPORAL_RESTRICTION: 0
OD_SUPERIOR_NASAL_RESTRICTION: 0
OS_SUPERIOR_NASAL_RESTRICTION: 0
OS_SUPERIOR_TEMPORAL_RESTRICTION: 0
OS_INFERIOR_NASAL_RESTRICTION: 0
OS_INFERIOR_TEMPORAL_RESTRICTION: 0
OD_NORMAL: 1
OD_INFERIOR_NASAL_RESTRICTION: 0
OS_NORMAL: 1
METHOD: COUNTING FINGERS

## 2023-08-14 ASSESSMENT — VISUAL ACUITY
METHOD: SNELLEN - LINEAR
CORRECTION_TYPE: GLASSES
OS_CC: 20/20
OD_CC: 20/20

## 2023-08-14 ASSESSMENT — SLIT LAMP EXAM - LIDS
COMMENTS: NORMAL, MGD
COMMENTS: NORMAL, MGD

## 2023-08-14 ASSESSMENT — CUP TO DISC RATIO
OD_RATIO: 0.1
OS_RATIO: 0.1

## 2023-08-14 ASSESSMENT — REFRACTION_WEARINGRX
OS_SPHERE: PLANO
OS_CYLINDER: +1.00
SPECS_TYPE: PAL
OS_AXIS: 019
OD_ADD: +2.75
OD_SPHERE: -0.50
OD_CYLINDER: +1.00
OD_AXIS: 019
OS_ADD: +2.75

## 2023-08-14 ASSESSMENT — TONOMETRY
OD_IOP_MMHG: 18
OS_IOP_MMHG: 16
IOP_METHOD: ICARE

## 2023-08-14 ASSESSMENT — EXTERNAL EXAM - LEFT EYE: OS_EXAM: NORMAL

## 2023-08-14 ASSESSMENT — EXTERNAL EXAM - RIGHT EYE: OD_EXAM: NORMAL

## 2023-08-14 NOTE — LETTER
"2023         RE:  :  MRN: Jody Ch  1940  [id number]     Dear Dr. Shell,    Thank you for asking me to see your very pleasant patient, Jody Ch, in neuro-ophthalmic consultation.  I would like to thank you for sending your records and I have summarized them in the history of present illness.   My assessment and plan are below.  For further details, please see my attached clinic note.      Jody Ch is a pleasant 82 year old White female who presents to my neuro-ophthalmology clinic today having been referred by Dr. Shell for intermittent binocular diplopia.       History of Present Illness   HPI:  Per chart review, patient was seen by Dr. Shell on 23 for symptoms of double vision. She was seen back on 23 for continued symptoms of intermittent double vision.    She has had 3 episodes in the past three years. All of a sudden her vision goes \"goofy\" which she thinks is double. She thinks it is double vision because the images do not connect. She shuts both eyes to make it go away. The longest it has occurred for is one minute or less. The first time it occurred she tried closing each eye individually which improved the vision. The second and third time when she closed each eye individually there was blurry/\"double\" vision in each eye. The most recent episode was Spring 2023. Denies headache, scalp tenderness, jaw claudication, weight loss, ptosis. She does have dysphagia on liquids since she was hospitalized with bacterial pneumonia.    She had cataract surgery 2-3 years ago and has struggled with dry eye since surgery. Her first episode of double vision occurred after cataract surgery.    She has not tried using eye drops to see if this improves her symptoms. She has crusting of eyelids in the morning and uses Systane artificial tears before bed. Her eyes are always burning. She does not do warm compresses.    Independent historians:  Patient      Review of " "outside testing: None relevant    Data   Review of outside clinical notes:  Optometry - Dr. Shell - 05/16/23  HPI: Jody Crawford" is a 82 y.o. female She is here for follow up on intermittent diplopia.   It only occurred once since I last saw her. It was about 5 minutes. She got a warm washcloth and closed her eyes; it lasted a little longer than the first few episodes. Binocular diplopa; she closes one of her eyes to resolve the double vision. Horizontal in orientation.   She was initially seen by me on 2/16/23 for evaluation of double vision. At that time, she had experienced it a couple times.   Eyes itch a lot; in the am feels like is stuff in the eyes.   She is having a hard time with the reading glasses. Which are +2.25; she has PALs.     ASSESSMENT  ICD-10-CM   1. Transient diplopia H53.2 AMB CONSULT TO OPTOMETRY/OPHTHALMOLOGY   2. Tremor, essential G25.0   3. Dry eyes H04.123   4. Pseudophakia of both eyes Z96.1   5. Floater, vitreous, bilateral H43.393   6. Double vision H53.2     PLAN:  She has had intermittent binocular diplopia with no other symptoms intermittently over the past 3-4 months of unknown etiology. Recommend neuro ophthalmology evaluation/ work up.     Optometry - Dr. Shell - 02/16/23  HPI: Jody Crawford" is a 82 y.o. female   Eyes are scratchy in the am. Uses Refresh at night.   Has gotten double vision a couple times. Does not last a long time. Happened a couple days ago. Reads a lot; has been under a lot of stress.   Often uses +2.25 or 2.00 for reading.   She has hx of cataract surgery with positive dysphotopsia, which she reports is better than it had been.   Sometimes she sees better without her glasses on.   Plan:   1. Spectacle prescription was provided and pt was advised on its use.  2. Patient Instructions   Orders Placed This Encounter    fluorometholone (FML) 0.1 % ophthalmic suspension   Sig: Place 1 Drop into both eyes four times daily. X 5 day, 3 times per day x 5 days, " twice daily x 5 days and once daily x 5 days   Dispense: 10 mL   Refill: 0   PLAN for treatment of Dry Eye Condition:  Lid hygiene: Apply warm compresses to the closed eyelid for several minutes; then gently scrub or massage the eyelid margin. Do this twice daily  Artificial tears (over-the-counter eye drops): use them 2 to 4 times per day  3. Continue to wear reading glasses PRN  4. Monitor double vision. follow up in 2-3 months.     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)    ACP (advance care planning)    Anxiety state    Atrophic vaginitis    Bilateral leg paresthesia    Bone pain    Breast cancer, stage 2, right (H)    Chronic cough    Chronic diarrhea    Chronic pain of right knee    Chronic rhinitis    Chronic right hip pain    Drug-induced nausea and vomiting    Dry mouth    Difficult or painful urination    Gallbladder polyp    Ganglion cyst of flexor tendon sheath of finger of right hand    History of left breast cancer    Hypercholesteremia    IC (interstitial cystitis)    Ileitis    Impaired fasting glucose    Insomnia secondary to depression with anxiety    Left leg swelling    Left thyroid nodule    Major depression, recurrent, full remission (H)    Mixed stress and urge urinary incontinence    Nasal congestion    Osteopenia    Other emphysema (H)    Diarrhea    Peripheral axonal neuropathy    Personal history of tobacco use, presenting hazards to health    Pharyngeal dysphagia    Other specified disorders of nose and nasal sinuses    Salivation excessive    SOB (shortness of breath)    Tremor, essential    Vitamin D insufficiency    Vitreous degeneration    Post-mastectomy lymphedema syndrome    Contracture of right shoulder    S/P bilateral cataract extraction    Recurrent aspiration pneumonia (H)     Other pulmonary embolism without acute cor pulmonale (H)    Oral phase dysphagia    ADAN (generalized anxiety disorder)    Depression, major, recurrent, mild (H)    Diverticulitis of gastrointestinal tract    GERD (gastroesophageal reflux disease)    Osteoporosis, senile    Vitamin B12 deficiency       Medications   Patient has a current medication list which includes the following prescription(s): albuterol, alendronate, cholecalciferol, b-12, escitalopram, fluticasone-umeclidin-vilant, hypromellose, aspirin, atorvastatin calcium, and calcium carbonate.    Family History   Patient's family history includes Alcoholism in her mother; Allergic rhinitis in her sister; Arthritis in her maternal grandmother; Asthma in her sister; Breast Cancer in her maternal cousin; Cancer in her maternal grandmother; Diabetes in her brother and brother; Hypertension in her brother, brother, maternal grandmother, and mother; Kidney Disease in her father; Osteoporosis in her maternal grandmother; Prostate Cancer in her father; Seizure Disorder in her father; Substance Abuse in her mother; Thyroid Disease in her brother; Ulcers in her mother.     Social History   Patient  reports that she quit smoking about 18 years ago. Her smoking use included cigarettes. She has a 20.00 pack-year smoking history. She has never used smokeless tobacco. She reports that she does not drink alcohol and does not use drugs.     Physical Exam   Exam:  Visual acuity today is 20/20 in the right eye, 20/20 in the left eye. Color vision is 11 in the right eye, 11 in the left eye. Intraocular pressure is 18 in the right eye and 16 in the left eye.  Pupils are isocoric and normally reactive. Anterior segment exam today notable for decreased tear breakup time and meibomian gland dysfunction. Fundus exam today unremarkable.     Tests ordered and interpreted today:  Sensorimotor exam with full extraocular movements. Ortho in all gaze directions. She has intermittent  monocular right double vision throughout exam.      Discussion of management / interpretation with another provider: None      1. Monocular diplopia  2. Dry eyes    This patient developed a transient disturbance in her vision on 3 occasions within the past 3 years.  It is unclear whether she actually had double vision or not however she noted on 1 occasion that the vision improved with covering either eye.  On the other 2 occasions the vision did not improve with covering either eye.  She is not quite certain that she actually experienced double vision.  On today's visit, she endorsed double vision coming out of the right eye only.  She has struggled with dry eye ever since cataract surgery 3 years ago.  Certainly dry eye syndrome can cause monocular double vision and blurred vision intermittently. I asked the patient to use artificial tears as well as warm compresses to the eyelid margin  on a regular basis.  I asked the patient to take fish oil capsules 2x/day for a month and then 1x/d thereafter.   If that is not beneficial we could consider punctal plugs, corticosteroid eye drops and Restasis.  At this point, given how normal her eye exam appears and the significant duration of being symptom-free between episodes, I would defer any further work-up until we have more information.  If asked her to clearly identify whether she has double vision or not, we discussed that she could consider an evaluation with a cornea specialist regarding her dry eye.      Again, thank you for allowing me to participate in the care of your patient.      Sincerely,    Geo Fong MD  Professor  Ophthalmology Residency   Director of Neuro-Ophthalmology  [facility] Endowed Chair  Departments of Ophthalmology, Neurology, and Neurosurgery  [facility] [address] [place] [address]  [address]  T - [phone]  F - [phone]  ROBIN [email or web address]      CC: Erika Shell MD  [id number]  [facility] [address]  Via Fax: [phone]

## 2023-08-14 NOTE — NURSING NOTE
Chief Complaints and History of Present Illnesses   Patient presents with    Diplopia Evaluation     Chief Complaint(s) and History of Present Illness(es)       Diplopia Evaluation              Laterality: both eyes    Frequency: intermittently    Associated symptoms: blurred vision.  Negative for droopy eyelid, headaches, dizziness, numbness and tingling              Comments    Double vision occurs only intermittently since approximately 3 years ago.  She has been having head tremor.  Horizontal in nature.  She is not sure if it goes away with closing either eye. It is more of a blur.  She usually closes both eyes and waits a few moments and then it will get better.  Her eyes will just all of a sudden weird feeling.  Denies dizziness.  Denies ptosis.      Dejah Gimenez on 8/14/2023 at 1:12 PM

## 2023-10-01 PROBLEM — K57.92 DIVERTICULITIS OF INTESTINE, PART UNSPECIFIED, WITHOUT PERFORATION OR ABSCESS WITHOUT BLEEDING: Status: ACTIVE | Noted: 2022-01-14

## 2023-10-06 ASSESSMENT — ENCOUNTER SYMPTOMS
PALPITATIONS: 0
SHORTNESS OF BREATH: 0
SORE THROAT: 0
NAUSEA: 0
WEAKNESS: 0
DIZZINESS: 0
HEADACHES: 0
COUGH: 0
FREQUENCY: 0
EYE PAIN: 0
FEVER: 0
ABDOMINAL PAIN: 0
CHILLS: 0
ARTHRALGIAS: 0
HEMATURIA: 0
DIARRHEA: 0
JOINT SWELLING: 0
PARESTHESIAS: 0
CONSTIPATION: 0
HEMATOCHEZIA: 0
NERVOUS/ANXIOUS: 0
BREAST MASS: 0
DYSURIA: 0
HEARTBURN: 0

## 2023-10-06 ASSESSMENT — ACTIVITIES OF DAILY LIVING (ADL): CURRENT_FUNCTION: NO ASSISTANCE NEEDED

## 2023-10-12 ASSESSMENT — PATIENT HEALTH QUESTIONNAIRE - PHQ9
10. IF YOU CHECKED OFF ANY PROBLEMS, HOW DIFFICULT HAVE THESE PROBLEMS MADE IT FOR YOU TO DO YOUR WORK, TAKE CARE OF THINGS AT HOME, OR GET ALONG WITH OTHER PEOPLE: NOT DIFFICULT AT ALL
SUM OF ALL RESPONSES TO PHQ QUESTIONS 1-9: 1
SUM OF ALL RESPONSES TO PHQ QUESTIONS 1-9: 1

## 2023-10-13 ENCOUNTER — OFFICE VISIT (OUTPATIENT)
Dept: FAMILY MEDICINE | Facility: CLINIC | Age: 83
End: 2023-10-13
Payer: MEDICARE

## 2023-10-13 ENCOUNTER — PATIENT OUTREACH (OUTPATIENT)
Dept: ONCOLOGY | Facility: CLINIC | Age: 83
End: 2023-10-13

## 2023-10-13 VITALS
WEIGHT: 160 LBS | TEMPERATURE: 98.5 F | DIASTOLIC BLOOD PRESSURE: 64 MMHG | RESPIRATION RATE: 20 BRPM | HEIGHT: 61 IN | SYSTOLIC BLOOD PRESSURE: 114 MMHG | OXYGEN SATURATION: 95 % | HEART RATE: 86 BPM | BODY MASS INDEX: 30.21 KG/M2

## 2023-10-13 DIAGNOSIS — E04.1 THYROID NODULE: ICD-10-CM

## 2023-10-13 DIAGNOSIS — C50.911 BREAST CANCER, STAGE 2, RIGHT (H): ICD-10-CM

## 2023-10-13 DIAGNOSIS — J43.9 PULMONARY EMPHYSEMA, UNSPECIFIED EMPHYSEMA TYPE (H): Chronic | ICD-10-CM

## 2023-10-13 DIAGNOSIS — J43.8 OTHER EMPHYSEMA (H): ICD-10-CM

## 2023-10-13 DIAGNOSIS — K21.9 GASTROESOPHAGEAL REFLUX DISEASE, UNSPECIFIED WHETHER ESOPHAGITIS PRESENT: ICD-10-CM

## 2023-10-13 DIAGNOSIS — I26.99 OTHER ACUTE PULMONARY EMBOLISM WITHOUT ACUTE COR PULMONALE (H): ICD-10-CM

## 2023-10-13 DIAGNOSIS — F33.42 MAJOR DEPRESSION, RECURRENT, FULL REMISSION (H): ICD-10-CM

## 2023-10-13 DIAGNOSIS — Z00.00 ENCOUNTER FOR MEDICARE ANNUAL WELLNESS EXAM: Primary | ICD-10-CM

## 2023-10-13 DIAGNOSIS — M85.80 OSTEOPENIA, UNSPECIFIED LOCATION: ICD-10-CM

## 2023-10-13 DIAGNOSIS — F33.0 DEPRESSION, MAJOR, RECURRENT, MILD (H): ICD-10-CM

## 2023-10-13 PROBLEM — M79.2 NEUROPATHIC PAIN: Status: RESOLVED | Noted: 2023-10-13 | Resolved: 2023-10-13

## 2023-10-13 PROBLEM — T50.905A DRUG-INDUCED NAUSEA AND VOMITING: Status: RESOLVED | Noted: 2018-07-17 | Resolved: 2023-10-13

## 2023-10-13 PROBLEM — Z17.1 ESTROGEN RECEPTOR NEGATIVE STATUS (ER-): Status: ACTIVE | Noted: 2023-10-13

## 2023-10-13 PROBLEM — Z90.49 HISTORY OF COLECTOMY: Status: ACTIVE | Noted: 2023-10-13

## 2023-10-13 PROBLEM — F41.1 ANXIETY STATE: Status: RESOLVED | Noted: 2017-01-31 | Resolved: 2023-10-13

## 2023-10-13 PROBLEM — M89.8X9 BONE PAIN: Status: RESOLVED | Noted: 2018-07-24 | Resolved: 2023-10-13

## 2023-10-13 PROBLEM — R11.2 DRUG-INDUCED NAUSEA AND VOMITING: Status: RESOLVED | Noted: 2018-07-17 | Resolved: 2023-10-13

## 2023-10-13 PROBLEM — Z90.49 HISTORY OF COLECTOMY: Status: RESOLVED | Noted: 2023-10-13 | Resolved: 2023-10-13

## 2023-10-13 PROBLEM — M79.2 NEUROPATHIC PAIN: Status: ACTIVE | Noted: 2023-10-13

## 2023-10-13 PROBLEM — K57.92 DIVERTICULITIS: Chronic | Status: RESOLVED | Noted: 2021-11-10 | Resolved: 2023-10-13

## 2023-10-13 PROBLEM — F32.A DEPRESSIVE DISORDER: Status: RESOLVED | Noted: 2023-10-13 | Resolved: 2023-10-13

## 2023-10-13 PROBLEM — I70.90 ATHEROSCLEROTIC VASCULAR DISEASE: Chronic | Status: ACTIVE | Noted: 2021-10-25

## 2023-10-13 PROBLEM — Z87.891 PERSONAL HISTORY OF TOBACCO USE, PRESENTING HAZARDS TO HEALTH: Status: RESOLVED | Noted: 2018-10-22 | Resolved: 2023-10-13

## 2023-10-13 PROBLEM — G89.29 CHRONIC RIGHT HIP PAIN: Status: RESOLVED | Noted: 2017-10-23 | Resolved: 2023-10-13

## 2023-10-13 PROBLEM — Z87.891 PERSONAL HISTORY OF TOBACCO USE, PRESENTING HAZARDS TO HEALTH: Status: ACTIVE | Noted: 2018-10-22

## 2023-10-13 PROBLEM — G62.9 PERIPHERAL NERVE DISEASE: Status: RESOLVED | Noted: 2023-10-13 | Resolved: 2023-10-13

## 2023-10-13 PROBLEM — G62.9 PERIPHERAL NERVE DISEASE: Status: ACTIVE | Noted: 2023-10-13

## 2023-10-13 PROBLEM — J34.89 OTHER SPECIFIED DISORDERS OF NOSE AND NASAL SINUSES: Status: RESOLVED | Noted: 2018-07-24 | Resolved: 2023-10-13

## 2023-10-13 PROBLEM — M25.611 DECREASED RANGE OF MOTION OF RIGHT SHOULDER: Status: RESOLVED | Noted: 2018-10-08 | Resolved: 2023-10-13

## 2023-10-13 PROBLEM — K12.1 STOMATITIS: Status: RESOLVED | Noted: 2023-10-13 | Resolved: 2023-10-13

## 2023-10-13 PROBLEM — M25.551 CHRONIC RIGHT HIP PAIN: Status: RESOLVED | Noted: 2017-10-23 | Resolved: 2023-10-13

## 2023-10-13 PROBLEM — K12.1 STOMATITIS: Status: ACTIVE | Noted: 2023-10-13

## 2023-10-13 PROBLEM — F32.A DEPRESSIVE DISORDER: Status: ACTIVE | Noted: 2023-10-13

## 2023-10-13 PROBLEM — I89.0 LYMPHEDEMA OF EXTREMITY: Status: RESOLVED | Noted: 2018-10-08 | Resolved: 2023-10-13

## 2023-10-13 PROBLEM — G89.29 CHRONIC PAIN OF RIGHT KNEE: Status: RESOLVED | Noted: 2017-10-23 | Resolved: 2023-10-13

## 2023-10-13 PROBLEM — M54.50 ACUTE MIDLINE LOW BACK PAIN WITHOUT SCIATICA: Status: ACTIVE | Noted: 2022-04-07

## 2023-10-13 PROBLEM — F41.8 INSOMNIA SECONDARY TO DEPRESSION WITH ANXIETY: Status: RESOLVED | Noted: 2017-01-31 | Resolved: 2023-10-13

## 2023-10-13 PROBLEM — F51.05 INSOMNIA SECONDARY TO DEPRESSION WITH ANXIETY: Status: RESOLVED | Noted: 2017-01-31 | Resolved: 2023-10-13

## 2023-10-13 PROBLEM — M25.561 CHRONIC PAIN OF RIGHT KNEE: Status: RESOLVED | Noted: 2017-10-23 | Resolved: 2023-10-13

## 2023-10-13 PROBLEM — K57.92 DIVERTICULITIS: Chronic | Status: ACTIVE | Noted: 2021-11-10

## 2023-10-13 PROBLEM — R19.7 DIARRHEA: Status: RESOLVED | Noted: 2018-07-24 | Resolved: 2023-10-13

## 2023-10-13 LAB — TSH SERPL DL<=0.005 MIU/L-ACNC: 0.81 UIU/ML (ref 0.3–4.2)

## 2023-10-13 PROCEDURE — 36415 COLL VENOUS BLD VENIPUNCTURE: CPT | Performed by: FAMILY MEDICINE

## 2023-10-13 PROCEDURE — 84443 ASSAY THYROID STIM HORMONE: CPT | Performed by: FAMILY MEDICINE

## 2023-10-13 PROCEDURE — 99204 OFFICE O/P NEW MOD 45 MIN: CPT | Mod: 25 | Performed by: FAMILY MEDICINE

## 2023-10-13 PROCEDURE — G0439 PPPS, SUBSEQ VISIT: HCPCS | Performed by: FAMILY MEDICINE

## 2023-10-13 RX ORDER — IBUPROFEN 200 MG
1 CAPSULE ORAL 2 TIMES DAILY
Qty: 180 TABLET | Refills: 3 | Status: SHIPPED | OUTPATIENT
Start: 2023-10-13

## 2023-10-13 RX ORDER — ESCITALOPRAM OXALATE 10 MG/1
10 TABLET ORAL DAILY
Qty: 90 TABLET | Refills: 3 | Status: SHIPPED | OUTPATIENT
Start: 2023-10-13

## 2023-10-13 RX ORDER — ALENDRONATE SODIUM 70 MG/1
70 TABLET ORAL
Qty: 12 TABLET | Refills: 3 | Status: SHIPPED | OUTPATIENT
Start: 2023-10-13

## 2023-10-13 ASSESSMENT — ANXIETY QUESTIONNAIRES
7. FEELING AFRAID AS IF SOMETHING AWFUL MIGHT HAPPEN: SEVERAL DAYS
GAD7 TOTAL SCORE: 3
GAD7 TOTAL SCORE: 3
5. BEING SO RESTLESS THAT IT IS HARD TO SIT STILL: NOT AT ALL
1. FEELING NERVOUS, ANXIOUS, OR ON EDGE: SEVERAL DAYS
IF YOU CHECKED OFF ANY PROBLEMS ON THIS QUESTIONNAIRE, HOW DIFFICULT HAVE THESE PROBLEMS MADE IT FOR YOU TO DO YOUR WORK, TAKE CARE OF THINGS AT HOME, OR GET ALONG WITH OTHER PEOPLE: NOT DIFFICULT AT ALL
3. WORRYING TOO MUCH ABOUT DIFFERENT THINGS: NOT AT ALL
2. NOT BEING ABLE TO STOP OR CONTROL WORRYING: NOT AT ALL
6. BECOMING EASILY ANNOYED OR IRRITABLE: SEVERAL DAYS

## 2023-10-13 ASSESSMENT — ENCOUNTER SYMPTOMS
DIZZINESS: 0
NAUSEA: 0
PARESTHESIAS: 0
ARTHRALGIAS: 0
JOINT SWELLING: 0
DIARRHEA: 0
COUGH: 0
HEARTBURN: 0
SORE THROAT: 0
PALPITATIONS: 0
HEMATOCHEZIA: 0
HEADACHES: 0
SHORTNESS OF BREATH: 0
CHILLS: 0
CONSTIPATION: 0
FEVER: 0
NERVOUS/ANXIOUS: 0
FREQUENCY: 0
WEAKNESS: 0
EYE PAIN: 0
HEMATURIA: 0
BREAST MASS: 0
DYSURIA: 0
ABDOMINAL PAIN: 0

## 2023-10-13 ASSESSMENT — ACTIVITIES OF DAILY LIVING (ADL): CURRENT_FUNCTION: NO ASSISTANCE NEEDED

## 2023-10-13 ASSESSMENT — PATIENT HEALTH QUESTIONNAIRE - PHQ9: 5. POOR APPETITE OR OVEREATING: NOT AT ALL

## 2023-10-13 NOTE — PATIENT INSTRUCTIONS
Patient Education   Personalized Prevention Plan  You are due for the preventive services outlined below.  Your care team is available to assist you in scheduling these services.  If you have already completed any of these items, please share that information with your care team to update in your medical record.  Health Maintenance Due   Topic Date Due     Osteoporosis Screening  Never done     Breathing Capacity Test  Never done     ANNUAL REVIEW OF HM ORDERS  Never done     COPD Action Plan  Never done     Depression Action Plan  Never done     RSV VACCINE 60+ (1 - 1-dose 60+ series) Never done     Discuss Advance Care Planning  11/22/2016     Flu Vaccine (1) 09/01/2023     COVID-19 Vaccine (6 - 2023-24 season) 09/01/2023     Annual Wellness Visit  10/27/2023

## 2023-10-13 NOTE — PROGRESS NOTES
"SUBJECTIVE:   Mikala is a 83 year old who presents for Preventive Visit.      10/13/2023     2:24 PM   Additional Questions   Roomed by RADHA Chirinos   Accompanied by self       Are you in the first 12 months of your Medicare coverage?  No    Healthy Habits:     In general, how would you rate your overall health?  Good    Frequency of exercise:  2-3 days/week    Duration of exercise:  15-30 minutes    Do you usually eat at least 4 servings of fruit and vegetables a day, include whole grains    & fiber and avoid regularly eating high fat or \"junk\" foods?  No    Taking medications regularly:  Yes    Medication side effects:  Muscle aches    Ability to successfully perform activities of daily living:  No assistance needed    Home Safety:  No safety concerns identified    Hearing Impairment:  Difficulty understanding soft or whispered speech    In the past 6 months, have you been bothered by leaking of urine?  No    In general, how would you rate your overall mental or emotional health?  Good    Additional concerns today:  Yes    New patient to , moved to [place] from [place].   Previous care was through [facility].  Would like to establish care.   She sees a pulmonologist that prescribes her inhalers but would like to request refills on her medications including for calcium supplement .      has moderate dementia and ms, they had to move to a facility and so this is why they are in [place]     She is scheduled to receive both covid and flu vaccine at her senior living center together    Dexa scan done 9/29/22- sent to be abstracted  Spirometry done 10/4/22- sent to be abstracted    Thyroid nodule - needs follow-up     Bilateral breast cancer   Double mastectomy  Chemo and radiation   Still has her port - 5 years ago   Requesting onc consult     On fosamax : not sure how long she has been on it.   Most recent dexa :  FINDINGS:     Lumbar Spine: L1-L3: BMD: 0.957 g/cm2. T-score: -1.8. Z-score: 0.1   RIGHT Hip Total: BMD: " 0.912 g/cm2. T-score: -0.8. Z-score: 1.2   RIGHT Hip Femoral neck: BMD: 0.837 g/cm2. T-score: -1.4. Z-score: 0.7   LEFT Hip Total: BMD: 0.967 g/cm2. T-score: -0.3. Z-score: 1.7   LEFT Hip Femoral neck: BMD: 0.850 g/cm2. T-score: -1.4. Z-score: 0.8       Today's PHQ-9 Score:       10/12/2023     2:44 PM   PHQ-9 SCORE   PHQ-9 Total Score MyChart 1 (Minimal depression)   PHQ-9 Total Score 1         Have you ever done Advance Care Planning? (For example, a Health Directive, POLST, or a discussion with a medical provider or your loved ones about your wishes): Yes, patient states has an Advance Care Planning document and will bring a copy to the clinic.       Fall risk  Fallen 2 or more times in the past year?: No  Any fall with injury in the past year?: No    Cognitive Screening   1) Repeat 3 items (Leader, Season, Table)    2) Clock draw: NORMAL  3) 3 item recall: Recalls 3 objects  Results: 3 items recalled: COGNITIVE IMPAIRMENT LESS LIKELY    Mini-CogTM Copyright S Ji. Licensed by the author for use in [facility]; reprinted with permission (edgar@[facility]). All rights reserved.      Do you have sleep apnea, excessive snoring or daytime drowsiness? : yes snoring.      Reviewed and updated as needed this visit by clinical staff                  Reviewed and updated as needed this visit by Provider                 Social History     Tobacco Use    Smoking status: Former     Packs/day: 0.50     Years: 40.00     Additional pack years: 0.00     Total pack years: 20.00     Types: Cigarettes     Quit date: 2005     Years since quittin.7    Smokeless tobacco: Never   Substance Use Topics    Alcohol use: No     Comment: Alcoholic Drinks/day: occassionaly              10/6/2023     9:56 AM   Alcohol Use   Prescreen: >3 drinks/day or >7 drinks/week? No          No data to display              Do you have a current opioid prescription? No  Do you use any other controlled substances or medications that are  not prescribed by a provider? None              Current providers sharing in care for this patient include:   Patient Care Team:  Carlin Tello MD as PCP - General (Internal Medicine)  Janine Barrera MD as MD (Hematology & Oncology)  Geo Fong MD as MD (Ophthalmology)  Geo Fong MD as Assigned Surgical Provider    The following health maintenance items are reviewed in Epic and correct as of today:  Health Maintenance   Topic Date Due    DEXA  Never done    SPIROMETRY  Never done    ANNUAL REVIEW OF HM ORDERS  Never done    COPD ACTION PLAN  Never done    DEPRESSION ACTION PLAN  Never done    RSV VACCINE 60+ (1 - 1-dose 60+ series) Never done    ADVANCE CARE PLANNING  11/22/2016    INFLUENZA VACCINE (1) 09/01/2023    COVID-19 Vaccine (6 - 2023-24 season) 09/01/2023    MEDICARE ANNUAL WELLNESS VISIT  10/27/2023    PHQ-9  04/13/2024    FALL RISK ASSESSMENT  10/13/2024    DTAP/TDAP/TD IMMUNIZATION (2 - Td or Tdap) 01/22/2025    Pneumococcal Vaccine: 65+ Years  Completed    ZOSTER IMMUNIZATION  Completed    IPV IMMUNIZATION  Aged Out    HPV IMMUNIZATION  Aged Out    MENINGITIS IMMUNIZATION  Aged Out    LUNG CANCER SCREENING  Discontinued             Pertinent mammograms are reviewed under the imaging tab.    Review of Systems   Constitutional:  Negative for chills and fever.   HENT:  Negative for congestion, ear pain, hearing loss and sore throat.    Eyes:  Negative for pain and visual disturbance.   Respiratory:  Negative for cough and shortness of breath.    Cardiovascular:  Negative for chest pain, palpitations and peripheral edema.   Gastrointestinal:  Negative for abdominal pain, constipation, diarrhea, heartburn, hematochezia and nausea.   Breasts:  Negative for tenderness, breast mass and discharge.   Genitourinary:  Negative for dysuria, frequency, genital sores, hematuria, pelvic pain, urgency, vaginal bleeding and vaginal discharge.   Musculoskeletal:  Negative for arthralgias and  "joint swelling.   Skin:  Negative for rash.   Neurological:  Negative for dizziness, weakness, headaches and paresthesias.   Psychiatric/Behavioral:  Negative for mood changes. The patient is not nervous/anxious.      Constitutional, HEENT, cardiovascular, pulmonary, gi and gu systems are negative, except as otherwise noted.    OBJECTIVE:   /64   Pulse 86   Temp 98.5  F (36.9  C) (Tympanic)   Resp 20   Ht 1.555 m (5' 1.22\")   Wt 72.6 kg (160 lb)   SpO2 95%   BMI 30.01 kg/m   Estimated body mass index is 29.54 kg/m  as calculated from the following:    Height as of 6/12/19: 1.575 m (5' 2\").    Weight as of 6/21/21: 73.3 kg (161 lb 8 oz).  Physical Exam  GENERAL: healthy, alert and no distress  EYES: Eyes grossly normal to inspection, PERRL and conjunctivae and sclerae normal  HENT: ear canals and TM's normal, nose and mouth without ulcers or lesions  NECK: no adenopathy, no asymmetry, masses, or scars and thyroid normal to palpation  RESP: lungs clear to auscultation - no rales, rhonchi or wheezes  CV: regular rate and rhythm, normal S1 S2, no S3 or S4, no murmur, click or rub, no peripheral edema and peripheral pulses strong  MS: no gross musculoskeletal defects noted, no edema  NEURO: Normal strength and tone, mentation intact and speech normal  PSYCH: mentation appears normal, affect normal/bright    Diagnostic Test Results:  Labs reviewed in Epic    ASSESSMENT / PLAN:   (Z00.00) Encounter for Medicare annual wellness exam  (primary encounter diagnosis)  Comment: discussed screenings, labs, vaccines. Reviewed problem list and meds.  The patient indicates understanding of these issues and agrees with the plan.   Plan: calcium 600 MG tablet            (M81.0) Osteoporosis, senile  Comment: repeat dexa. If bone density is stable, discontinue fosamax.   Plan: DEXA HIP/PELVIS/SPINE - Future, alendronate         (FOSAMAX) 70 MG tablet            (J43.8) Other emphysema (H)  Comment: seeing pulm.   Plan: " COPD ACTION PLAN            (F33.0) Depression, major, recurrent, mild (H24)  Comment: doing well on the lexapro   Plan: escitalopram (LEXAPRO) 10 MG tablet            (C50.911) Breast cancer, stage 2, right (H)  Comment: referral made and discussed   Plan: Adult Oncology/Hematology  Referral            (I26.99) Other acute pulmonary embolism without acute cor pulmonale (H)  Comment: history of PE   Plan:     (J43.9) Pulmonary emphysema, unspecified emphysema type (H)  Comment: quit smoking   Plan:     (F33.42) Major depression, recurrent, full remission (H24)  Comment: doing ok, on lexapro   Plan:     (K21.9) Gastroesophageal reflux disease, unspecified whether esophagitis present  Comment: daily PPI   Plan: omeprazole (PRILOSEC) 20 MG DR capsule            (M85.80) Osteopenia, unspecified location  Comment: on fosamax   Plan:     (E04.1) Thyroid nodule  Comment: recheck ultrasound and nodule. The patient indicates understanding of these issues and agrees with the plan.   Plan: TSH with free T4 reflex, US Head Neck Soft         Tissue            Patient has been advised of split billing requirements and indicates understanding: Yes      COUNSELING:  Reviewed preventive health counseling, as reflected in patient instructions       Regular exercise       Healthy diet/nutrition      She reports that she quit smoking about 18 years ago. Her smoking use included cigarettes. She has a 20.00 pack-year smoking history. She has never used smokeless tobacco.      Appropriate preventive services were discussed with this patient, including applicable screening as appropriate for fall prevention, nutrition, physical activity, Tobacco-use cessation, weight loss and cognition.  Checklist reviewing preventive services available has been given to the patient.    Reviewed patients plan of care and provided an AVS. The Basic Care Plan (routine screening as documented in Health Maintenance) for Jody meets the Care Plan  requirement. This Care Plan has been established and reviewed with the Patient.      Talisha Richard MD  [facility]    Identified Health Risks:  I have reviewed Opioid Use Disorder and Substance Use Disorder risk factors and made any needed referrals.

## 2023-10-13 NOTE — PROGRESS NOTES
New Patient Oncology Nurse Navigator Note     Referring provider: Talisha Richard MD     Referring Clinic/Organization: [facility]     Referred to (specialty:) Medical Oncology      Date Referral Received: October 13, 2023     Evaluation for:  C50.911 (ICD-10-CM) - Breast cancer, stage 2, right (H)     Clinical History (per Nurse review of records provided):      2006  The patient was originally diagnosed with left breast cancer in 2006, IDC grade II, DCIS, ER/AR pos, HER-2 neg. She subsequently underwent radical mastectomy and completed 5 years of Arimidex. No radiation at that time.  Mikala has a history of left breast cancer diagnosed and treated in 2006. She underwent left modified radical mastectomy due to a diagnosis of stage 1 micropapillary breast cancer. She did not receive any chemotherapy due to a low oncotype score (RS = 10), and did not receive radiation. Started on anastrozole February 2007 and completed February 2012. Dr. Phyllis Colon has been her medical oncologist.     2018  Screening mammogram in May 2018 showed an abnormality in the upper quadrant of the right breast. She subsequently underwent US left breast biopsy which showed IDC grade I, ER/AR neg.     5/18/2018 - Case: [id number]  [facility]  A) RIGHT BREAST, 1:00, ZONE 1, ULTRASOUND-GUIDED CORE BIOPSY:   1. Invasive ductal carcinoma      a. [place] grade: II of III; [place] score: 7 of 9      b. Angio-lymphatic invasion: Absent      c. Associated DCIS: Present      d. Subtype: Solid with focal necrosis      e. Grade of DCIS: 3 of 3   2. Breast Ancillary Testing:        a. Hormone Receptors:             Estrogen receptor: Negative             Progesterone receptor: Negative       b. HER2 by IHC: Equivocal (2+ by manual morphometry)          HER2 by FISH: Negative            HER2/CEP17 ratio: 1.04            HER2 signals/cell: 1.86            CEP17 signals/cell: 1.78                                   She then had  right mastectomy with SLN biopsy on 6/1/2018, final pathology returned with 2.5 cm IDC grade II, lymphovascular invasion present, 4/13 LN pos with largest being 1.2 cm, HER-2 negative by FISH.   6/1/2018 - Case: [id number]  Dr. Cassie Jansen                                A) RIGHT BREAST, MASTECTOMY:   1. Invasive ductal carcinoma with apocrine features, [place] grade II, located at 1 o'clock      a. Invasive carcinoma measures 2.5 cm      b. Associated DCIS, cribriform type, nuclear grade 3      c. Previous core biopsy site changes   2. Invasive carcinoma and DCIS are located more than 1 cm from all margins   3. Background proliferative fibrocystic changes including nodular adenosis measuring 0.5 cm at 11 o'clock   4. Atypical lobular hyperplasia   5. Benign nipple   6. Breast Ancillary Testing: Performed on prior case ([id number])       a. Estrogen receptor: Negative       b. Progesterone receptor: Negative       c. HER2 by IHC: Equivocal (2+ by manual morphometry)       d. HER2 by FISH: Negative            HER2/CEP17 ratio: 1.04            HER2 signals/cell: 1.86   B) RIGHT AXILLARY SENTINEL LYMPH NODE BIOPSY:   One lymph node with metastatic carcinoma (1/1)      a. Metastasis measures 1.2 cm      b. Extranodal invasion is not identified   C) RIGHT AXILLARY SENTINEL LYMPH NODE BIOPSY:   One lymph node negative for carcinoma (0/1)   D) RIGHT AXILLARY SENTINEL LYMPH NODE BIOPSY:   One lymph node with metastatic carcinoma (1/1)      a. Metastasis measures 0.3 cm      b. Extranodal invasion is not identified   E) RIGHT AXILLARY CONTENTS, REGIONAL DISSECTION:   Two of ten lymph nodes positive for micrometastatic carcinoma (2/10)      a. Both micrometastases measure 0.08 cm      b. Extranodal invasion is not identified     [facility] - [facility] Phyllis Colon    The patient has underwent PET CT without evidence of metastatic disease. A left thyroid nodule has been biopsied which was negative. CT chest  showing stable lung nodules.     She has completed 4 cycles ddAC + Neulasta support chemotherapy. She started Taxol on 9/04/2018 and last received C4D1 on 10/16/2018. Also has been seeing PT for ROM and lymphedema.     She was in the care of Dr. Janine Barrera for radiation oncology in 2018:  SITE TREATED: right CW  TOTAL DOSE: 6040  NUMBER OF FRACTIONS: 33  DATES COMPLETED: 11/08/2018 - 12/27/2018 2023  New patient to , moved to [place] from [place].   Still has her port - 5 years ago   Requesting onc consult   On fosamax : not sure how long she has been on it.   Most recent DEXA appears to be 9/29/22     Records Location: Care Everywhere, Media, and See Bookmarked material     Records Needed:   2006 records needed:  Diagnostic imaging and reports for workup of left breast cancer  Core pathology report  Surgery consult, operative, and progress notes Dr. Julio Murrell  Mastectomy pathology report  Medical oncology consult and progress notes  Oncotype report    10/16 15:13 - Telephoned and left generic message referencing referral from Dr. Richard and requesting call back at [facility]. (Resides in [place], [place])    10/24 15:12 - Telephoned home number and left voice message requesting call back. Then telephoned patient's mobile number. She is working with [facility] for port flush and oncologist to determine follow up. Referral to [facility] NOT needed.

## 2023-10-15 ENCOUNTER — MYC MEDICAL ADVICE (OUTPATIENT)
Dept: FAMILY MEDICINE | Facility: CLINIC | Age: 83
End: 2023-10-15
Payer: MEDICARE

## 2023-10-15 DIAGNOSIS — Z13.6 SCREENING FOR CARDIOVASCULAR CONDITION: ICD-10-CM

## 2023-10-15 DIAGNOSIS — Z13.1 SCREENING FOR DIABETES MELLITUS: Primary | ICD-10-CM

## 2023-10-17 ENCOUNTER — PRE VISIT (OUTPATIENT)
Dept: ONCOLOGY | Facility: CLINIC | Age: 83
End: 2023-10-17
Payer: MEDICARE

## 2023-10-17 NOTE — TELEPHONE ENCOUNTER
RECORDS STATUS - BREAST    Breast cancer, stage 2, right (H)   [facility]    Appt Date: TBD NN WQ    Action    Action Taken 10/17/2023 8:51AM PAULETTE     I called pt Mikala - unavailable. I called again- she was treated at [facility]     I called [facility] Ph: [phone] - they don't have any mammos from 2006. They will push breast scans from [id number].     I called [facility] Med Recs Dept Ph: [phone] #1- I requested the pt's Oncotype from 2006. They tried to locate the oncotype. They found some geonomic reference in the pt's chart from 2007. They submitted a formal request.     10/17/2023 3:21pm PAULETTE   I received some records from [facility] and faxed them off to HIM and the NN Team.     I called our internal film file room to have breast scans resolved in PACS Ph: [phone]- Nyasia will work on the scans.       NOTES STATUS DETAILS   OFFICE NOTE from referring provider  Talisha Richard MD    OFFICE NOTE from medical oncologist [facility] - historical notes from 2006 are in EPIC/CE    The records include notes from Dr. Julio Murrell   DISCHARGE SUMMARY from hospital     DISCHARGE REPORT from the ER     OPERATIVE REPORT     MEDICATION LIST Complete [facility]   CLINICAL TRIAL TREATMENTS TO DATE     LABS     PATHOLOGY REPORTS External biopsy  12/6/2006 Breast Biopsy - [facility]- In EPIC   ANYTHING RELATED TO DIAGNOSIS     GENONOMIC TESTING     TYPE:     IMAGING (NEED IMAGES & REPORT)     CT SCANS     MRI     MAMMO Complete - [facility]  2008, 2012- 2018 2006 Mammo report is in CE but [facility] doesn't have access to the image.    ULTRASOUND     PET

## 2023-10-18 ENCOUNTER — MYC MEDICAL ADVICE (OUTPATIENT)
Dept: FAMILY MEDICINE | Facility: CLINIC | Age: 83
End: 2023-10-18
Payer: MEDICARE

## 2023-10-27 ENCOUNTER — HOSPITAL ENCOUNTER (OUTPATIENT)
Dept: ULTRASOUND IMAGING | Facility: HOSPITAL | Age: 83
Discharge: HOME | End: 2023-10-27
Attending: FAMILY MEDICINE | Admitting: FAMILY MEDICINE
Payer: MEDICARE

## 2023-10-27 DIAGNOSIS — E04.1 THYROID NODULE: ICD-10-CM

## 2023-10-27 PROCEDURE — 76536 US EXAM OF HEAD AND NECK: CPT

## 2023-10-30 ENCOUNTER — MYC MEDICAL ADVICE (OUTPATIENT)
Dept: FAMILY MEDICINE | Facility: CLINIC | Age: 83
End: 2023-10-30
Payer: MEDICARE

## 2023-10-30 DIAGNOSIS — E04.1 THYROID NODULE: Primary | ICD-10-CM

## 2023-11-02 ENCOUNTER — LAB (OUTPATIENT)
Dept: LAB | Facility: CLINIC | Age: 83
End: 2023-11-02
Attending: FAMILY MEDICINE
Payer: MEDICARE

## 2023-11-02 DIAGNOSIS — Z13.6 SCREENING FOR CARDIOVASCULAR CONDITION: ICD-10-CM

## 2023-11-02 DIAGNOSIS — Z13.1 SCREENING FOR DIABETES MELLITUS: ICD-10-CM

## 2023-11-02 LAB
CHOLEST SERPL-MCNC: 182 MG/DL
HBA1C MFR BLD: 5.5 % (ref 0–5.6)
HDLC SERPL-MCNC: 62 MG/DL
LDLC SERPL CALC-MCNC: 102 MG/DL
NONHDLC SERPL-MCNC: 120 MG/DL
TRIGL SERPL-MCNC: 89 MG/DL

## 2023-11-02 PROCEDURE — 83036 HEMOGLOBIN GLYCOSYLATED A1C: CPT | Mod: GZ

## 2023-11-02 PROCEDURE — 36415 COLL VENOUS BLD VENIPUNCTURE: CPT

## 2023-11-02 PROCEDURE — 80061 LIPID PANEL: CPT

## 2023-12-14 ENCOUNTER — HOSPITAL ENCOUNTER (OUTPATIENT)
Dept: ULTRASOUND IMAGING | Facility: HOSPITAL | Age: 83
Discharge: HOME | End: 2023-12-14
Attending: FAMILY MEDICINE | Admitting: FAMILY MEDICINE
Payer: MEDICARE

## 2023-12-14 DIAGNOSIS — E04.1 THYROID NODULE: ICD-10-CM

## 2023-12-14 PROCEDURE — 88173 CYTOPATH EVAL FNA REPORT: CPT | Mod: TC | Performed by: FAMILY MEDICINE

## 2023-12-14 PROCEDURE — 272N000710 US BIOPSY THYROID FINE NEEDLE ASPIRATION

## 2023-12-15 LAB
PATH REPORT.COMMENTS IMP SPEC: ABNORMAL
PATH REPORT.COMMENTS IMP SPEC: YES
PATH REPORT.FINAL DX SPEC: ABNORMAL
PATH REPORT.GROSS SPEC: ABNORMAL
PATH REPORT.MICROSCOPIC SPEC OTHER STN: ABNORMAL
PATH REPORT.RELEVANT HX SPEC: ABNORMAL

## 2023-12-15 PROCEDURE — 88173 CYTOPATH EVAL FNA REPORT: CPT | Mod: 26 | Performed by: PATHOLOGY

## 2023-12-15 PROCEDURE — 88172 CYTP DX EVAL FNA 1ST EA SITE: CPT | Mod: 26 | Performed by: PATHOLOGY

## 2023-12-18 DIAGNOSIS — E04.1 THYROID NODULE: Primary | ICD-10-CM

## 2023-12-18 DIAGNOSIS — E04.1 COLLOID THYROID NODULE: ICD-10-CM

## 2024-01-02 LAB — SCANNED LAB RESULT: NORMAL

## 2024-01-10 ENCOUNTER — MYC MEDICAL ADVICE (OUTPATIENT)
Dept: FAMILY MEDICINE | Facility: CLINIC | Age: 84
End: 2024-01-10
Payer: MEDICARE

## 2024-01-10 DIAGNOSIS — E04.1 THYROID NODULE: Primary | ICD-10-CM

## 2024-01-10 DIAGNOSIS — E04.1 COLLOID THYROID NODULE: Primary | ICD-10-CM

## 2024-01-10 PROCEDURE — 99207 E-CONSULT TO ENDOCRINOLOGY (ADULT OUTPT PROVIDER TO SPECIALIST WRITTEN QUESTION & RESPONSE): CPT | Performed by: FAMILY MEDICINE

## 2024-01-11 ENCOUNTER — E-CONSULT (OUTPATIENT)
Dept: ENDOCRINOLOGY | Facility: CLINIC | Age: 84
End: 2024-01-11
Payer: MEDICARE

## 2024-01-11 PROCEDURE — 99207 PR NO BILLABLE SERVICE THIS VISIT: CPT

## 2024-01-12 ENCOUNTER — TELEPHONE (OUTPATIENT)
Dept: ENDOCRINOLOGY | Facility: CLINIC | Age: 84
End: 2024-01-12

## 2024-01-12 ENCOUNTER — VIRTUAL VISIT (OUTPATIENT)
Dept: ENDOCRINOLOGY | Facility: CLINIC | Age: 84
End: 2024-01-12
Attending: FAMILY MEDICINE
Payer: MEDICARE

## 2024-01-12 ENCOUNTER — MYC MEDICAL ADVICE (OUTPATIENT)
Dept: FAMILY MEDICINE | Facility: CLINIC | Age: 84
End: 2024-01-12

## 2024-01-12 DIAGNOSIS — E04.1 THYROID NODULE: ICD-10-CM

## 2024-01-12 PROCEDURE — 99204 OFFICE O/P NEW MOD 45 MIN: CPT | Mod: 95 | Performed by: INTERNAL MEDICINE

## 2024-01-12 ASSESSMENT — PAIN SCALES - GENERAL: PAINLEVEL: NO PAIN (0)

## 2024-01-12 NOTE — PROGRESS NOTES
"    1/11/2024     E-Consult has been denied due to: Doesn't meet criteria for E-Consult - Did not include a specific clinical question, or no question provided.    Interprofessional consultation requested by:  Talisha Richard MD      Clinical Question/Purpose: Mikala had thyroid FNA and AUS result. Afirma testing was reported out as \"suspicious\" with a cancer risk of 50%.  I referred her to endo but she cannot get in until April and is very concerned.     Patient assessment and information reviewed:  Currently scheduled for 10/4/203 endocrine appt    10/13/23 TSH0.81   10/27/23 thyroid US:   Right sup nodule 0.5 x 0.4 x 0.7 cm hyperechoic  Left nodule 4.1 x 4.1 x 5.1 cm (42.9 cm3) ; was 4.7 cm (11/8/22); was 4.4  x 3.8 x 3.4 cm (28.4 cm3) 7/3/2018;  12/14/23 FNAB AUS , Afirma GSC \"suspicious\" negative MTC and parathyroid; Negative BRAF, RET/PTC1, RET/PTC3, TERT; Xpression atlas: no variant/fusion detected;     Impression  5.1 cm left thyroid nodule with AUS cytology, Afirma GSC suspicious, negative Xpression atlas. The nodule has increased 50% by volume since 2018.      Recommendations:   Neck soft tissue ultrasound level 2-7 with cine for preoperative preparation  Refer to thyroid surgeon Dr Juan Perea or Eryn Jose   Get the past thyroid imaging pushed to PACS - from [facility] 11/8/22 thyroid US, 10/4/22 chest CT, 12/29/2020 chest CT       The recommendations provided in this E-Consult are based on a review of clinical data pertinent to the clinical question presented, without a review of the patient's complete medical record or, the benefit of a comprehensive in-person or virtual patient evaluation. This consultation should not replace the clinical judgement and evaluation of the provider ordering this E-Consult. Any new clinical issues, or changes in patient status since the filing of this E-Consult will need to be taken into account when assessing these recommendations. Please contact me if you have " further questions.    My total time spent reviewing clinical information and formulating assessment was 15 minutes.        Linette Collier MD

## 2024-01-12 NOTE — PROGRESS NOTES
Video-Visit Details    Type of service:  Video Visit  Video Start Time: 1002  Video End Time:1053  Originating Location (pt. Location): [place], [place]  Distant Location (provider location):  Home  Platform used for Video Visit: Rochelle Wallace MD  Endocrinology Clinic Visit 1/12/2024    NAME:  Jody Ch  PCP:  Talisha Richard  MRN:  [id number]  Reason for Consult:  thyroid nodule  Requesting Provider:  Referred Self       HISTORY OF PRESENT ILLNESS  Jody Ch is a 83 year old female with h/o breast cancer, chronic cough, pharyngeal dysphagia, MDD who is here for initial evaluation and management of thyroid nodule.    Pt has breast cancer and had a scan and incidentally found of thyroid nodule.  FNA 2018.  Pt moved and re-established  care with PCP, got US, nodule has grown and got a repeat FNA.  New hoarsness for >1 year, comes and goes  Choking with swallowing, got test at [facility]. Swallowing problem with liquid, and solid food. Eg. Hard boil egg, feeling like food stuck and need to drink water to follow  No changes in breathing    Brother s/p thyroid surgery  Cousin with thyroid problems    Currently breast cancer, 5 years in remission    REVIEW OF SYSTEMS  10 point negative except as mentioned in HPI    Past Medical/Surgical History:  Past Medical History:   Diagnosis Date    Anxiety state 01/31/2017    Arthritis     Atrophic vaginitis 05/08/2014    Overview:  UPDATE: Followed-up with Dr. Cassie Arteaga on 5-8-14. Noted that patient did not want to pay for the estrogen cream. Impression female stress incontinence and atrophic vaginitis.    Bilateral leg paresthesia 05/13/2014    Overview:  US FRANCISCO W EXERCISE BILATERAL 5-14-14: IMPRESSION: RIGHT LOWER EXTREMITY: FRANCISCO at rest is normal with a normal response to exercise. These findings would not be consistent with symptoms of arterial claudication. LEFT LOWER EXTREMITY: FRANCISCO at rest is normal with a normal response to exercise. These findings  would not be consistent with symptoms of arterial claudication. US VENOUS LOWER EXTREM    Bone pain 07/24/2018    Breast cancer (H)     Breast cancer, stage 2, right (H) 05/16/2018    Overview:  Added automatically from request for surgery [id number]    Chronic cough 02/12/2018    Overview:  XR CHEST 2 VIEWS PA AND LATERAL 12-4-17: Normal heart size and pulmonary vascularity. Tiny granulomas with some fibrosis in the right lung base. The left lung is clear. Slight deviation of the upper trachea from left to right presumably due to thyroid enlargement stable. No change from previous. No acute process is identified. No focal pulmonary infiltrates.    Chronic diarrhea 05/12/2017    Overview:  UPDATE: COLONOSCOPY DIAGNOSTIC 7-25-17: Aphthous ulcer of ileum. Diverticulosis of colon without diverticulitis. Pathology Results: NEOTERMINAL ILEUM,  BIOPSY: Nonspecific chronic active ileitis. No dysplasia or malignancy. COLON, RANDOM, BIOPSY: Normal colonic mucosa STOOL TESTS on 5-12-17 for culture, Clostridium dificile toxin, WBC, Giardia antigen, Campylobacter, Shiga toxin, Stool f    Chronic pain of right knee 10/23/2017    Overview:  XR KNEE 3 VIEWS RIGHT 10-23-17: Negative knee. No fracture or dislocation. No joint effusion.    Chronic rhinitis 06/08/2017    Chronic right hip pain 10/23/2017    Overview:  XR HIP 2 OR 3 VIEWS W PELVIS RIGHT 10-23-17: Arthritic change right hip. Pelvis otherwise negative.    Decreased range of motion of right shoulder 10/08/2018    Depressive disorder     Diarrhea 07/24/2018    Difficult or painful urination 12/08/2009    Overview:  UPDATE: Followed-up with Dr. Cassie Arteaga on 5-8-14. Noted that patient did not want to pay for the estrogen cream. Impression female stress incontinence and atrophic vaginitis. Exacerbation of her dysuria symptoms. She has not used her estrace vaginal cream due to cost noted 9-10-13. Urinalysis negative on 9-10-13. Urinalysis and urine microscopy showed some signs  of urinary tract infecti    Drug-induced nausea and vomiting 07/17/2018    Dry mouth 05/24/2016    Gallbladder polyp 04/14/2012    Overview:  UPDATE:US ABDOMEN LIMITED RUQ 4-18-16: 5 mm stone versus polyp in the gallbladder, decreased in size since comparison study where measured 7 mm. The gallbladder is contracted despite being NPO, which limits evaluation. Benign parapelvic cyst in the lower pole of the right kidney measuring 2.8 cm, is unchanged. US ABDOMEN LIMITED RUQ on 2-3-14:  Stable appearance of a 8 mm cholesterol tristin    Ganglion cyst of flexor tendon sheath of finger of right hand 05/24/2016    History of breast cancer 11/21/2006    Overview:  UPDATE: XR MAMMO UNI SCREEN FFDM RIGHT 4-14-14: ACR 1 Negative. Followed-up with oncologist Dr. Phyllis Colon on 4-29-14. Stable.    XR MAMMO UNI SCREEN FFDM RIGHT: 4-4-12, 4-5-13: ACR 2 Benign Finding. Followed-up with Dr. Phyllis Colon 5-23-13. CBC and CMP normal except for low glucose of 55 . CA 27-29 normal.   Followed-up with Dr. Phyllis Colon 11-16-12. CBC and CMP normal exce    Hypercholesteremia 12/08/2014    Overview:  UPDATE: Rx atorvastatin (Lipitor) 1-22-15. She sent medical message on 3-18-15 requesting to take atorvastatin (Lipitor) 20 mg tablet every other day. This would not work for atorvastatin (Lipitor) . I advised to take half tablet (10 mg) daily rather than taking one tablet every other day. ASCVD Risk  10 year risk 7.9 % calculated on 12/8/2014.  Recommendation moderate to high -    IC (interstitial cystitis) 12/02/2010    Overview:  UPDATE: UPDATE: Followed-up with Dr. Cassie Arteaga on 5-8-14. Noted that patient did not want to pay for the estrogen cream. Impression female stress incontinence and atrophic vaginitis. Exacerbation of her dysuria symptoms. She has not used her estrace vaginal cream due to cost noted 9-10-13. Urinalysis negative on 9-10-13. Urinalysis and urine microscopy showed some signs of urinary tract    Ileitis  07/25/2017    Overview:  COLONOSCOPY DIAGNOSTIC 7-25-17: Aphthous ulcer of ileum. Diverticulosis of colon without diverticulitis. Pathology Results: NEOTERMINAL ILEUM,  BIOPSY: Nonspecific chronic active ileitis. No dysplasia or malignancy. COLON, RANDOM, BIOPSY: Normal colonic mucosa . Advised to follow-up with [facility] regarding chronic active ileitis.    Impaired fasting glucose 12/01/2006    Insomnia secondary to depression with anxiety 01/31/2017    Left leg swelling 05/13/2014    Overview:  US VENOUS LOWER EXTREMITY LEFT 5-14-14: Left leg veins are negative for DVT.    Left thyroid nodule 04/30/2018    Lymphedema of extremity 10/08/2018    Major depression, recurrent, full remission (H24) 01/31/2017    Overview:  Inpatient treatment for depression after divorce in 1983. Major depression diagnosed due to financial worries diagnosed 11-23-10 by oncologist, Louise Burns. Citalopram 20 mg daily started. Citalopram discontinued on 5-19-11 per patient request.    Malignant neoplasm of upper-inner quadrant of right breast in female, 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) 10/08/2018    Mixed stress and urge urinary incontinence 07/24/2006    Overview:  UPDATE: Followed-up with Dr. Cassie Arteaga on 5-8-14. Noted that patient did not want to pay for the estrogen cream. Impression female stress incontinence and atrophic vaginitis.  Exacerbation of her dysuria symptoms. She has not used her estrace vaginal cream due to cost noted 9-10-13. Urinalysis negative on 9-10-13. Urinalysis and urine microscopy showed some signs of urinary tract infect    Nasal congestion 06/05/2014    Osteopenia 11/01/2006    Overview:  UPDATE:  XR DXA BONE DENSITY 2 SITES AXIAL 5/16/2018: Osteopenia. Lumbar Spine L1-L4 T-score -1.8 . Mean Bilateral Femoral Neck T-score -1.4 . FRAX Results: 10-year probability of major osteoporotic fracture is 10.8%, and of hip fracture is 2%, based on left femoral neck BMD. Basic bone health recommended.  XR DXA BONE DENSITY  2 SITES AXIAL 4-18-16: T score AP spine -2.1, Left femoral ne    Other emphysema (H) 04/30/2018    Other specified disorders of nose and nasal sinuses 07/24/2018    Peripheral axonal neuropathy 11/22/2011    Overview:  UPDATE:  She thinks gabapentin (Neurontin) has been helpful without side effect of leg swelling discussed on 11-20-14. She agreed to increase dose to 300 mg at bedtime. She called on 10-30-14 requesting for gabapentin (Neurontin) as nortriptyline not helpful. Reminded her that she complained of leg swelling with gabapentin (Neurontin) in the past.  EMG 5-19-14: Borderline abnormal EMG du    Personal history of tobacco use, presenting hazards to health 10/22/2018    Overview:  20 pack years    Pharyngeal dysphagia 12/08/2009    Recurrent UTI 09/12/2013    Overview:  UPDATE: Followed-up with Dr. Cassie Arteaga on 5-8-14. Noted that patient did not want to pay for the estrogen cream. Impression female stress incontinence and atrophic vaginitis.  Exacerbation of her dysuria symptoms. She has not used her estrace vaginal cream due to cost noted 9-10-13. Urinalysis negative on 9-10-13. Urinalysis and urine microscopy showed some signs of urinary tract infect    Salivation excessive 12/17/2008    Overview:  may be due to postnasal drip causing excessive salivation as she tends to swallow the postnasal drainage.She is not candidate for tricyclic antidepressant like nortriptyline (can cause dry mouth) to lessen salivary secretions as this would excerbate her dry lips.    SOB (shortness of breath) 05/13/2014    Overview:  XR CHEST 2 VIEWS PA AND LATERAL 5-13-14: Impression: On the lateral view, a small patchy opacity is again visualized and has a few linear markings. This may be chronic, it is suggested on the prior exam slightly more prominent but this is probably due to technique, could represent chronic areas of subsegmental volume loss or previous consolidation. It is not well visualized on the PA vie     Tremor, essential 11/22/2011    Overview:  Neurology consult Dr. Luther Armenta on 3-26-12. Impression essential tremor. MRI brain. Neurontin 100 mg bid started to help tremors and bilateral lower extremity paresthesia thought due to impaired FG or prediabetes. Normal EMG of lower extremity on 4-11-12 but compound motor action potentials low which can be seen in early axonal neuropathy. Developed swelling with gabapentin (Neurontin    Vitamin D insufficiency 12/03/2012    Overview:  Vitamin D was low at 28.1 on 12-3-12. Take vitamin D 3000 units daily for 8 weeks, then take vitamin D 2000 units indefinitely. Vitamin D was normal at 40.8 on 3-6-13. Continue vitamin D 2000 units indefinitely.    Vitreous degeneration 10/22/2018    Overview:  Right eye     Past Surgical History:   Procedure Laterality Date    ABDOMEN SURGERY      APPENDECTOMY      BLEPHAROPLASTY Bilateral 11/07/2013    FINGER GANGLION CYST EXCISION Right 06/23/2016    right ring finger    LUMPECTOMY BREAST  11/21/2006    Infiltrating ductal carcinoma, micropapillary variant, [place]    MASTECTOMY MODIFIED RADICAL Left 12/06/2006    OTHER SURGICAL HISTORY Right 11/07/2013    GA REMOVE EYELID LESN (NOT CHALAZION)    OTHER SURGICAL HISTORY Right 05/18/2018    US BIOPSY BREAST NEEDLE W BIBIANA W GUIDE RIGHT    OTHER SURGICAL HISTORY Right 06/01/2018    RIGHT SIMPLE MASTECTOMY WITH RIGHT SENTINAL LYMPH NODE [id number] AND RIGHT AXILLARY NODE DISSECTION       Medications  Current Outpatient Medications   Medication    albuterol (ACCUNEB) 1.25 MG/3ML neb solution    alendronate (FOSAMAX) 70 MG tablet    calcium 600 MG tablet    Cholecalciferol (VITAMIN D3 PO)    Cyanocobalamin (B-12) 1000 MCG TBCR    escitalopram (LEXAPRO) 10 MG tablet    Fluticasone-Umeclidin-Vilant (TRELEGY ELLIPTA) 100-62.5-25 MCG/ACT oral inhaler    omeprazole (PRILOSEC) 20 MG DR capsule     No current facility-administered medications for this visit.       Allergies  Allergies   Allergen  Reactions    Cephalexin Shortness Of Breath     Bladder burning    Amitriptyline Unknown    Gabapentin Swelling     Shortness of breath    Latex Swelling and Other (See Comments)    Sulfa Antibiotics Unknown    Tetracyclines & Related Unknown         Family History  family history includes Alcoholism in her mother; Allergic rhinitis in her sister; Arthritis in her maternal grandmother; Asthma in her sister; Breast Cancer in her maternal cousin; Cancer in her maternal grandmother; Diabetes in her brother and brother; Hypertension in her brother, brother, maternal grandmother, and mother; Kidney Disease in her father; Osteoporosis in her maternal grandmother; Prostate Cancer in her father; Seizure Disorder in her father; Substance Abuse in her mother; Thyroid Disease in her brother; Ulcers in her mother.    Social History  Social History     Tobacco Use    Smoking status: Former     Packs/day: 0.50     Years: 40.00     Additional pack years: 0.00     Total pack years: 20.00     Types: Cigarettes     Quit date: 2005     Years since quittin.9    Smokeless tobacco: Never   Substance Use Topics    Alcohol use: No     Comment: Alcoholic Drinks/day: occassionaly        Physical Exam  There were no vitals taken for this visit.  There is no height or weight on file to calculate BMI.  GENERAL :  In no apparent distress  NEURO: awake, alert, responds appropriately to questions.      DATA REVIEW  Labs/Imaging  NA Cytology APN (2018 10:44 AM CDT)  Lab Results - FNA Cytology APN (2018 10:44 AM CDT)  Component Value Ref Range Test Method Analysis Time Performed At Pathologist Signature   Case Report Medical Cytology Report                           Case: [id number]                                  Authorizing Provider:  Phyllis Colon MD Collected:           2018 1044              Ordering Location:     [facility]    Received:            2018 1044                                     Imaging                                                                      Pathologist:           Sultana Levin MD                                                        Specimen:    Left Thyroid                                                                             07/05/2018 11:19 AM [facility]     Final Diagnosis A) THYROID, LEFT, ULTRASOUND-GUIDED FINE NEEDLE ASPIRATION:  1. Benign colloid nodule  2. Negative for malignancy in this sample  3. See microscopic description and comment          [facility] MEDICAL IMAGING   US THYROID/PARATHYROID   7/3/2018 10:47 AM     INDICATION: Thyroid nodule.   TECHNIQUE: Routine.   COMPARISON: CT 6/13/2018.     FINDINGS:   RIGHT thyroid lobe measures 3.6 x 1.3 x 1.2 cm. Normal echotexture with no focal   nodule.    LEFT thyroid lobe measures 5.0 x 4.1 x 3.9 cm. Solid nodule involving most of   the mid left lobe of the thyroid.   Nodule: 4.4 x 3.8 x 3.4 cm   Composition: Solid or almost completely solid 2   Echogenicity: Hyperechoic or isoechoic 1   Shape: Wider-than-tall 0   Margin: Smooth 0   Echogenic Foci: None, or large comet-tail artifacts 0   Point Total: 3 points. TI-RADS 3. FNA if >=2.5 cm. Follow if >=1.5 cm.     Isthmus measures 3 mm. No additional findings.     No cervical lymphadenopathy.     CONCLUSION:   1. Nodule in the left lobe of the thyroid meets criteria at which fine-needle   aspiration is suggested. This corresponds to the CT and PET scan findings. See   biopsy report.     Study Result    Narrative & Impression   EXAM: US THYROID  LOCATION: [facility]  DATE: 10/27/2023     INDICATION:  Thyroid nodule  COMPARISON: 11/8/2022  TECHNIQUE: Thyroid ultrasound.      FINDINGS:  RIGHT lobe: 3.4 x 1.4 x 1.4 cm. Homogeneous echotexture.  Isthmus: 0.4 mm.  LEFT lobe: 5.9 x 4.5 x 3.9 cm. Homogeneous echotexture.     NECK: No cervical lymphadenopathy.     NODULES:     Nodule 1: Right superior, 0.7 x  "0.5 x 0.4 cm, previously 0.4 x 0.5 x 0.5 cm   Composition: Solid or almost completely solid, 2 points   Echogenicity: Hyperechoic or isoechoic, 1 point   Shape: Wider-than-tall, 0 points   Margin: Smooth, 0 points   Echogenic Foci: None, or large comet-tail artifacts, 0 points   Point Total: 3 points. TI-RADS 3. If 2.5 cm or larger, recommend FNA; if 1.5 cm or larger, recommend follow up US at 1, 3, and 5 years.      Nodule 2: Left lobe, 5.1 x 4.1 x 4.1 cm, previously 4.7 x 3.7 x 4.4 cm  Composition: Solid or almost completely solid, 2 points   Echogenicity: Hyperechoic or isoechoic, 1 point   Shape: Not taller than wide, 0 points   Margin: Smooth, 0 points   Echogenic Foci: None, or large comet-tail artifacts, 0 points   Point Total: 3 points. TI-RADS 3. If 2.5 cm or larger, recommend FNA; if 1.5 cm or larger, recommend follow up US at 1, 3, and 5 years.                                                                      IMPRESSION: Slight increase in size of dominant left thyroid nodule compared to 11/8/2022. Previous biopsy in 2018 demonstrated a benign colloid nodule. No other enlarging nodules.     TSH   Date Value Ref Range Status   10/13/2023 0.81 0.30 - 4.20 uIU/mL Final     No results found for: \"T4\"]  Lab Results   Component Value Date    A1C 5.5 11/02/2023 12/14/2023  2:02 PM       Status: Final result       Visible to patient: Yes (seen)       Dx: Thyroid nodule    4 Result Notes       2 Patient Communications       Component  Ref Range & Units  Resulting Agency   Final Diagnosis   Specimen A                 Interpretation:                   Atypia of Undetermined Significance (AUS)      ULTRASOUND-GUIDED NEEDLE ASPIRATION OF NODULE, LEFT LOBE OF THYROID GLAND:        -  ATYPICAL CELLS PRESENT (BETHESDA SYSTEM CATEGORY 3, ATYPIA OF UNDETERMINED SIGNIFICANCE)        -  NEGATIVE FOR CYTOLOGICALLY MALIGNANT CELLS                 Adequacy:                 Satisfactory for evaluation       "         ASSESSMENT/PLAN:   ## Thyroid nodules, 5.1 cm FNA AUS 12/14 23 (previously FNA benign 2018)  ## Mild obstructive symptoms  Pt is under significant stress (moving to assisted living).  She has mild obstructive symptoms which are tolerable for now  She is unclear if she wants surgery given her age  She has repeat FNA scheduled for 3/2024  Today we discussed about thyroid nodules, options for Indeterminate  -- ThyGeNEXT/ThyraMIR from FNA 12/14/23    Follow-up 2 months      INFORMATION FOR PATIENTS  THYROID NODULES AND   FINE NEEDLE ASPIRATION BIOPSY OF THE THYROID     The finding of a thyroid nodule is almost never an emergency.  Thyroid nodules are common, occurring in up to 50% of patients over the age of 50 years.       Innocent (not important enough to have been detected during life) thyroid cancer may be found in around 5% of people.   Likewise, about 5-15% of patients with demonstrated thyroid nodules will prove to have thyroid cancer if subjected to aggressive diagnostic measures.  As a rule, thyroid cancer has an excellent prognosis.  Therefore, in each patient with thyroid nodules, the decision has to be made about how important it is to identify and treat a cancer, if present.       When we find nodules on the thyroid we use ultrasound and either palpation or ultrasound guided biopsy to help determine which patients, from the large number with nodules on the thyroid, are in the group containing an important thyroid cancer.       Ultrasound Guided Fine Needle Aspiration Biopsy of the Thyroid uses the ultrasound eye to see the nodule and the needle during the biopsy procedure.  This procedure is performed in the radiology department.  The radiologist uses a small needle to remove cells from the specific area of the thyroid. The cells are then analyzed under the microscope to determine whether or not they might be cancer.       The results of thyroid biopsy come in 4 categories     Benign or negative for  cancer.  This is most common result, found in approximately 60-70% of biopsy specimens.  There remains a < 6 % chance that this result is wrong.     Positive for cancer.  This is found about 5 % of the time. There remains a  < 1% chance that cancer is not present when we make this diagnosis by biopsy. This result leads to a recommendation for surgery to make the final diagnosis of cancer.      Indeterminate, or the grey zone.  This is found in approximately 20-30% of patients.  This diagnosis identifies a higher risk group, often resulting in diagnostic surgery to establish the final diagnosis.  If all patients in this group go to surgery, only 10-30%, on average, prove to have cancer.  This group is subdivided into 3 subcategories: atypia of undetermined significance, follicular neoplasm and suspicious, with increasing risk.       Insufficient for diagnosis. This is found in 5-10% of biopsies. When this occurs we need to repeat the biopsy.       The greatest risk of thyroid biopsy is that the result is not clear (that it is in the indeterminate grey zone group), resulting in future thyroid surgery for what is likely to be benign thyroid disease.  There is a small risk of bleeding or infection.       If your doctor has recommended you have an ultrasound guided fine needle aspiration biopsy of the thyroid, your appointment may be scheduled by calling [phone].  Be sure to specify that the procedure is to be ultrasound-guided and that it is to be scheduled in radiology n      No diagnosis found.  Orders Placed This Encounter   Procedures    ThyGeNEXT     Jamison Wallace MD

## 2024-01-12 NOTE — LETTER
1/12/2024         RE: Jody Ch  [id number]  [address]  [place] [facility] [address]        Dear Colleague,    Thank you for referring your patient, Jody Ch, to the [facility] [place]. Please see a copy of my visit note below.    Video-Visit Details    Type of service:  Video Visit  Video Start Time: 1002  Video End Time:1053  Originating Location (pt. Location): [place], [place]  Distant Location (provider location):  Home  Platform used for Video Visit: Rochelle Wallace MD  Endocrinology Clinic Visit 1/12/2024    NAME:  Jody Ch  PCP:  Talisha Richard  MRN:  [id number]  Reason for Consult:  thyroid nodule  Requesting Provider:  Referred Self       HISTORY OF PRESENT ILLNESS  Jody Ch is a 83 year old female with h/o breast cancer, chronic cough, pharyngeal dysphagia, MDD who is here for initial evaluation and management of thyroid nodule.    Pt has breast cancer and had a scan and incidentally found of thyroid nodule.  FNA 2018.  Pt moved and re-established  care with PCP, got US, nodule has grown and got a repeat FNA.  New hoarsness for >1 year, comes and goes  Choking with swallowing, got test at [facility]. Swallowing problem with liquid, and solid food. Eg. Hard boil egg, feeling like food stuck and need to drink water to follow  No changes in breathing    Brother s/p thyroid surgery  Cousin with thyroid problems    Currently breast cancer, 5 years in remission    REVIEW OF SYSTEMS  10 point negative except as mentioned in HPI    Past Medical/Surgical History:  Past Medical History:   Diagnosis Date     Anxiety state 01/31/2017     Arthritis      Atrophic vaginitis 05/08/2014    Overview:  UPDATE: Followed-up with Dr. Cassie Arteaga on 5-8-14. Noted that patient did not want to pay for the estrogen cream. Impression female stress incontinence and atrophic vaginitis.     Bilateral leg paresthesia 05/13/2014    Overview:  US FRANCISCO W EXERCISE BILATERAL  5-14-14: IMPRESSION: RIGHT LOWER EXTREMITY: FRANCISCO at rest is normal with a normal response to exercise. These findings would not be consistent with symptoms of arterial claudication. LEFT LOWER EXTREMITY: FRANCISCO at rest is normal with a normal response to exercise. These findings would not be consistent with symptoms of arterial claudication. US VENOUS LOWER EXTREM     Bone pain 07/24/2018     Breast cancer (H)      Breast cancer, stage 2, right (H) 05/16/2018    Overview:  Added automatically from request for surgery [id number]     Chronic cough 02/12/2018    Overview:  XR CHEST 2 VIEWS PA AND LATERAL 12-4-17: Normal heart size and pulmonary vascularity. Tiny granulomas with some fibrosis in the right lung base. The left lung is clear. Slight deviation of the upper trachea from left to right presumably due to thyroid enlargement stable. No change from previous. No acute process is identified. No focal pulmonary infiltrates.     Chronic diarrhea 05/12/2017    Overview:  UPDATE: COLONOSCOPY DIAGNOSTIC 7-25-17: Aphthous ulcer of ileum. Diverticulosis of colon without diverticulitis. Pathology Results: NEOTERMINAL ILEUM,  BIOPSY: Nonspecific chronic active ileitis. No dysplasia or malignancy. COLON, RANDOM, BIOPSY: Normal colonic mucosa STOOL TESTS on 5-12-17 for culture, Clostridium dificile toxin, WBC, Giardia antigen, Campylobacter, Shiga toxin, Stool f     Chronic pain of right knee 10/23/2017    Overview:  XR KNEE 3 VIEWS RIGHT 10-23-17: Negative knee. No fracture or dislocation. No joint effusion.     Chronic rhinitis 06/08/2017     Chronic right hip pain 10/23/2017    Overview:  XR HIP 2 OR 3 VIEWS W PELVIS RIGHT 10-23-17: Arthritic change right hip. Pelvis otherwise negative.     Decreased range of motion of right shoulder 10/08/2018     Depressive disorder      Diarrhea 07/24/2018     Difficult or painful urination 12/08/2009    Overview:  UPDATE: Followed-up with Dr. Cassie Arteaga on 5-8-14. Noted that patient did not  want to pay for the estrogen cream. Impression female stress incontinence and atrophic vaginitis. Exacerbation of her dysuria symptoms. She has not used her estrace vaginal cream due to cost noted 9-10-13. Urinalysis negative on 9-10-13. Urinalysis and urine microscopy showed some signs of urinary tract infecti     Drug-induced nausea and vomiting 07/17/2018     Dry mouth 05/24/2016     Gallbladder polyp 04/14/2012    Overview:  UPDATE:US ABDOMEN LIMITED RUQ 4-18-16: 5 mm stone versus polyp in the gallbladder, decreased in size since comparison study where measured 7 mm. The gallbladder is contracted despite being NPO, which limits evaluation. Benign parapelvic cyst in the lower pole of the right kidney measuring 2.8 cm, is unchanged. US ABDOMEN LIMITED RUQ on 2-3-14:  Stable appearance of a 8 mm cholesterol tristin     Ganglion cyst of flexor tendon sheath of finger of right hand 05/24/2016     History of breast cancer 11/21/2006    Overview:  UPDATE: XR MAMMO UNI SCREEN FFDM RIGHT 4-14-14: ACR 1 Negative. Followed-up with oncologist Dr. Phyllis Colon on 4-29-14. Stable.    XR MAMMO UNI SCREEN FFDM RIGHT: 4-4-12, 4-5-13: ACR 2 Benign Finding. Followed-up with Dr. Phyllis Colon 5-23-13. CBC and CMP normal except for low glucose of 55 . CA 27-29 normal.   Followed-up with Dr. Phyllis Colon 11-16-12. CBC and CMP normal exce     Hypercholesteremia 12/08/2014    Overview:  UPDATE: Rx atorvastatin (Lipitor) 1-22-15. She sent medical message on 3-18-15 requesting to take atorvastatin (Lipitor) 20 mg tablet every other day. This would not work for atorvastatin (Lipitor) . I advised to take half tablet (10 mg) daily rather than taking one tablet every other day. ASCVD Risk  10 year risk 7.9 % calculated on 12/8/2014.  Recommendation moderate to high -     IC (interstitial cystitis) 12/02/2010    Overview:  UPDATE: UPDATE: Followed-up with Dr. Cassie Arteaga on 5-8-14. Noted that patient did not want to pay for the  estrogen cream. Impression female stress incontinence and atrophic vaginitis. Exacerbation of her dysuria symptoms. She has not used her estrace vaginal cream due to cost noted 9-10-13. Urinalysis negative on 9-10-13. Urinalysis and urine microscopy showed some signs of urinary tract     Ileitis 07/25/2017    Overview:  COLONOSCOPY DIAGNOSTIC 7-25-17: Aphthous ulcer of ileum. Diverticulosis of colon without diverticulitis. Pathology Results: NEOTERMINAL ILEUM,  BIOPSY: Nonspecific chronic active ileitis. No dysplasia or malignancy. COLON, RANDOM, BIOPSY: Normal colonic mucosa . Advised to follow-up with [facility] regarding chronic active ileitis.     Impaired fasting glucose 12/01/2006     Insomnia secondary to depression with anxiety 01/31/2017     Left leg swelling 05/13/2014    Overview:   VENOUS LOWER EXTREMITY LEFT 5-14-14: Left leg veins are negative for DVT.     Left thyroid nodule 04/30/2018     Lymphedema of extremity 10/08/2018     Major depression, recurrent, full remission (H24) 01/31/2017    Overview:  Inpatient treatment for depression after divorce in 1983. Major depression diagnosed due to financial worries diagnosed 11-23-10 by oncologist, Louise Burns. Citalopram 20 mg daily started. Citalopram discontinued on 5-19-11 per patient request.     Malignant neoplasm of upper-inner quadrant of right breast in female, 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) 10/08/2018     Mixed stress and urge urinary incontinence 07/24/2006    Overview:  UPDATE: Followed-up with Dr. Cassie Arteaga on 5-8-14. Noted that patient did not want to pay for the estrogen cream. Impression female stress incontinence and atrophic vaginitis.  Exacerbation of her dysuria symptoms. She has not used her estrace vaginal cream due to cost noted 9-10-13. Urinalysis negative on 9-10-13. Urinalysis and urine microscopy showed some signs of urinary tract infect     Nasal congestion 06/05/2014     Osteopenia 11/01/2006    Overview:  UPDATE:  XR DXA BONE  DENSITY 2 SITES AXIAL 5/16/2018: Osteopenia. Lumbar Spine L1-L4 T-score -1.8 . Mean Bilateral Femoral Neck T-score -1.4 . FRAX Results: 10-year probability of major osteoporotic fracture is 10.8%, and of hip fracture is 2%, based on left femoral neck BMD. Basic bone health recommended.  XR DXA BONE DENSITY 2 SITES AXIAL 4-18-16: T score AP spine -2.1, Left femoral ne     Other emphysema (H) 04/30/2018     Other specified disorders of nose and nasal sinuses 07/24/2018     Peripheral axonal neuropathy 11/22/2011    Overview:  UPDATE:  She thinks gabapentin (Neurontin) has been helpful without side effect of leg swelling discussed on 11-20-14. She agreed to increase dose to 300 mg at bedtime. She called on 10-30-14 requesting for gabapentin (Neurontin) as nortriptyline not helpful. Reminded her that she complained of leg swelling with gabapentin (Neurontin) in the past.  EMG 5-19-14: Borderline abnormal EMG du     Personal history of tobacco use, presenting hazards to health 10/22/2018    Overview:  20 pack years     Pharyngeal dysphagia 12/08/2009     Recurrent UTI 09/12/2013    Overview:  UPDATE: Followed-up with Dr. Cassie Arteaga on 5-8-14. Noted that patient did not want to pay for the estrogen cream. Impression female stress incontinence and atrophic vaginitis.  Exacerbation of her dysuria symptoms. She has not used her estrace vaginal cream due to cost noted 9-10-13. Urinalysis negative on 9-10-13. Urinalysis and urine microscopy showed some signs of urinary tract infect     Salivation excessive 12/17/2008    Overview:  may be due to postnasal drip causing excessive salivation as she tends to swallow the postnasal drainage.She is not candidate for tricyclic antidepressant like nortriptyline (can cause dry mouth) to lessen salivary secretions as this would excerbate her dry lips.     SOB (shortness of breath) 05/13/2014    Overview:  XR CHEST 2 VIEWS PA AND LATERAL 5-13-14: Impression: On the lateral view, a small  patchy opacity is again visualized and has a few linear markings. This may be chronic, it is suggested on the prior exam slightly more prominent but this is probably due to technique, could represent chronic areas of subsegmental volume loss or previous consolidation. It is not well visualized on the PA vie     Tremor, essential 11/22/2011    Overview:  Neurology consult Dr. Luther Armenta on 3-26-12. Impression essential tremor. MRI brain. Neurontin 100 mg bid started to help tremors and bilateral lower extremity paresthesia thought due to impaired FG or prediabetes. Normal EMG of lower extremity on 4-11-12 but compound motor action potentials low which can be seen in early axonal neuropathy. Developed swelling with gabapentin (Neurontin     Vitamin D insufficiency 12/03/2012    Overview:  Vitamin D was low at 28.1 on 12-3-12. Take vitamin D 3000 units daily for 8 weeks, then take vitamin D 2000 units indefinitely. Vitamin D was normal at 40.8 on 3-6-13. Continue vitamin D 2000 units indefinitely.     Vitreous degeneration 10/22/2018    Overview:  Right eye     Past Surgical History:   Procedure Laterality Date     ABDOMEN SURGERY       APPENDECTOMY       BLEPHAROPLASTY Bilateral 11/07/2013     FINGER GANGLION CYST EXCISION Right 06/23/2016    right ring finger     LUMPECTOMY BREAST  11/21/2006    Infiltrating ductal carcinoma, micropapillary variant, Janey     MASTECTOMY MODIFIED RADICAL Left 12/06/2006     OTHER SURGICAL HISTORY Right 11/07/2013    MO REMOVE EYELID LESN (NOT CHALAZION)     OTHER SURGICAL HISTORY Right 05/18/2018    US BIOPSY BREAST NEEDLE W BIBIANA W GUIDE RIGHT     OTHER SURGICAL HISTORY Right 06/01/2018    RIGHT SIMPLE MASTECTOMY WITH RIGHT SENTINAL LYMPH NODE [id number] AND RIGHT AXILLARY NODE DISSECTION       Medications  Current Outpatient Medications   Medication     albuterol (ACCUNEB) 1.25 MG/3ML neb solution     alendronate (FOSAMAX) 70 MG tablet     calcium 600 MG tablet      Cholecalciferol (VITAMIN D3 PO)     Cyanocobalamin (B-12) 1000 MCG TBCR     escitalopram (LEXAPRO) 10 MG tablet     Fluticasone-Umeclidin-Vilant (TRELEGY ELLIPTA) 100-62.5-25 MCG/ACT oral inhaler     omeprazole (PRILOSEC) 20 MG DR capsule     No current facility-administered medications for this visit.       Allergies  Allergies   Allergen Reactions     Cephalexin Shortness Of Breath     Bladder burning     Amitriptyline Unknown     Gabapentin Swelling     Shortness of breath     Latex Swelling and Other (See Comments)     Sulfa Antibiotics Unknown     Tetracyclines & Related Unknown         Family History  family history includes Alcoholism in her mother; Allergic rhinitis in her sister; Arthritis in her maternal grandmother; Asthma in her sister; Breast Cancer in her maternal cousin; Cancer in her maternal grandmother; Diabetes in her brother and brother; Hypertension in her brother, brother, maternal grandmother, and mother; Kidney Disease in her father; Osteoporosis in her maternal grandmother; Prostate Cancer in her father; Seizure Disorder in her father; Substance Abuse in her mother; Thyroid Disease in her brother; Ulcers in her mother.    Social History  Social History     Tobacco Use     Smoking status: Former     Packs/day: 0.50     Years: 40.00     Additional pack years: 0.00     Total pack years: 20.00     Types: Cigarettes     Quit date: 2005     Years since quittin.9     Smokeless tobacco: Never   Substance Use Topics     Alcohol use: No     Comment: Alcoholic Drinks/day: occassionaly        Physical Exam  There were no vitals taken for this visit.  There is no height or weight on file to calculate BMI.  GENERAL :  In no apparent distress  NEURO: awake, alert, responds appropriately to questions.      DATA REVIEW  Labs/Imaging  NA Cytology APN (2018 10:44 AM CDT)  Lab Results - FNA Cytology APN (2018 10:44 AM CDT)  Component Value Ref Range Test Method Analysis Time Performed At  Pathologist Signature   Case Report Medical Cytology Report                           Case: [id number]                                  Authorizing Provider:  Phyllis Colon MD Collected:           07/03/2018 1044              Ordering Location:     [facility]    Received:            07/03/2018 1044                                    Imaging                                                                      Pathologist:           Sultana Levin MD                                                        Specimen:    Left Thyroid                                                                             07/05/2018 11:19 AM CDT [facility] LABORATORY-CENTRAL LABORATORY     Final Diagnosis A) THYROID, LEFT, ULTRASOUND-GUIDED FINE NEEDLE ASPIRATION:  1. Benign colloid nodule  2. Negative for malignancy in this sample  3. See microscopic description and comment          [facility] MEDICAL IMAGING   US THYROID/PARATHYROID   7/3/2018 10:47 AM     INDICATION: Thyroid nodule.   TECHNIQUE: Routine.   COMPARISON: CT 6/13/2018.     FINDINGS:   RIGHT thyroid lobe measures 3.6 x 1.3 x 1.2 cm. Normal echotexture with no focal   nodule.    LEFT thyroid lobe measures 5.0 x 4.1 x 3.9 cm. Solid nodule involving most of   the mid left lobe of the thyroid.   Nodule: 4.4 x 3.8 x 3.4 cm   Composition: Solid or almost completely solid 2   Echogenicity: Hyperechoic or isoechoic 1   Shape: Wider-than-tall 0   Margin: Smooth 0   Echogenic Foci: None, or large comet-tail artifacts 0   Point Total: 3 points. TI-RADS 3. FNA if >=2.5 cm. Follow if >=1.5 cm.     Isthmus measures 3 mm. No additional findings.     No cervical lymphadenopathy.     CONCLUSION:   1. Nodule in the left lobe of the thyroid meets criteria at which fine-needle   aspiration is suggested. This corresponds to the CT and PET scan findings. See   biopsy report.     Study Result    Narrative & Impression   EXAM: US THYROID  LOCATION: [facility]  "[facility]  DATE: 10/27/2023     INDICATION:  Thyroid nodule  COMPARISON: 11/8/2022  TECHNIQUE: Thyroid ultrasound.      FINDINGS:  RIGHT lobe: 3.4 x 1.4 x 1.4 cm. Homogeneous echotexture.  Isthmus: 0.4 mm.  LEFT lobe: 5.9 x 4.5 x 3.9 cm. Homogeneous echotexture.     NECK: No cervical lymphadenopathy.     NODULES:     Nodule 1: Right superior, 0.7 x 0.5 x 0.4 cm, previously 0.4 x 0.5 x 0.5 cm   Composition: Solid or almost completely solid, 2 points   Echogenicity: Hyperechoic or isoechoic, 1 point   Shape: Wider-than-tall, 0 points   Margin: Smooth, 0 points   Echogenic Foci: None, or large comet-tail artifacts, 0 points   Point Total: 3 points. TI-RADS 3. If 2.5 cm or larger, recommend FNA; if 1.5 cm or larger, recommend follow up US at 1, 3, and 5 years.      Nodule 2: Left lobe, 5.1 x 4.1 x 4.1 cm, previously 4.7 x 3.7 x 4.4 cm  Composition: Solid or almost completely solid, 2 points   Echogenicity: Hyperechoic or isoechoic, 1 point   Shape: Not taller than wide, 0 points   Margin: Smooth, 0 points   Echogenic Foci: None, or large comet-tail artifacts, 0 points   Point Total: 3 points. TI-RADS 3. If 2.5 cm or larger, recommend FNA; if 1.5 cm or larger, recommend follow up US at 1, 3, and 5 years.                                                                      IMPRESSION: Slight increase in size of dominant left thyroid nodule compared to 11/8/2022. Previous biopsy in 2018 demonstrated a benign colloid nodule. No other enlarging nodules.     TSH   Date Value Ref Range Status   10/13/2023 0.81 0.30 - 4.20 uIU/mL Final     No results found for: \"T4\"]  Lab Results   Component Value Date    A1C 5.5 11/02/2023 12/14/2023  2:02 PM       Status: Final result       Visible to patient: Yes (seen)       Dx: Thyroid nodule    4 Result Notes       2 Patient Communications       Component  Ref Range & Units  Resulting Agency   Final Diagnosis   Specimen A                 Interpretation:                   Atypia " of Undetermined Significance (AUS)      ULTRASOUND-GUIDED NEEDLE ASPIRATION OF NODULE, LEFT LOBE OF THYROID GLAND:        -  ATYPICAL CELLS PRESENT (BETHESDA SYSTEM CATEGORY 3, ATYPIA OF UNDETERMINED SIGNIFICANCE)        -  NEGATIVE FOR CYTOLOGICALLY MALIGNANT CELLS                 Adequacy:                 Satisfactory for evaluation               ASSESSMENT/PLAN:   ## Thyroid nodules, 5.1 cm FNA AUS 12/14 23 (previously FNA benign 2018)  ## Mild obstructive symptoms  Pt is under significant stress (moving to assisted living).  She has mild obstructive symptoms which are tolerable for now  She is unclear if she wants surgery given her age  She has repeat FNA scheduled for 3/2024  Today we discussed about thyroid nodules, options for Indeterminate  -- ThyGeNEXT/ThyraMIR from FNA 12/14/23    Follow-up 2 months      INFORMATION FOR PATIENTS  THYROID NODULES AND   FINE NEEDLE ASPIRATION BIOPSY OF THE THYROID     The finding of a thyroid nodule is almost never an emergency.  Thyroid nodules are common, occurring in up to 50% of patients over the age of 50 years.       Innocent (not important enough to have been detected during life) thyroid cancer may be found in around 5% of people.   Likewise, about 5-15% of patients with demonstrated thyroid nodules will prove to have thyroid cancer if subjected to aggressive diagnostic measures.  As a rule, thyroid cancer has an excellent prognosis.  Therefore, in each patient with thyroid nodules, the decision has to be made about how important it is to identify and treat a cancer, if present.       When we find nodules on the thyroid we use ultrasound and either palpation or ultrasound guided biopsy to help determine which patients, from the large number with nodules on the thyroid, are in the group containing an important thyroid cancer.       Ultrasound Guided Fine Needle Aspiration Biopsy of the Thyroid uses the ultrasound eye to see the nodule and the needle during the biopsy  procedure.  This procedure is performed in the radiology department.  The radiologist uses a small needle to remove cells from the specific area of the thyroid. The cells are then analyzed under the microscope to determine whether or not they might be cancer.       The results of thyroid biopsy come in 4 categories     Benign or negative for cancer.  This is most common result, found in approximately 60-70% of biopsy specimens.  There remains a < 6 % chance that this result is wrong.     Positive for cancer.  This is found about 5 % of the time. There remains a  < 1% chance that cancer is not present when we make this diagnosis by biopsy. This result leads to a recommendation for surgery to make the final diagnosis of cancer.      Indeterminate, or the grey zone.  This is found in approximately 20-30% of patients.  This diagnosis identifies a higher risk group, often resulting in diagnostic surgery to establish the final diagnosis.  If all patients in this group go to surgery, only 10-30%, on average, prove to have cancer.  This group is subdivided into 3 subcategories: atypia of undetermined significance, follicular neoplasm and suspicious, with increasing risk.       Insufficient for diagnosis. This is found in 5-10% of biopsies. When this occurs we need to repeat the biopsy.       The greatest risk of thyroid biopsy is that the result is not clear (that it is in the indeterminate grey zone group), resulting in future thyroid surgery for what is likely to be benign thyroid disease.  There is a small risk of bleeding or infection.       If your doctor has recommended you have an ultrasound guided fine needle aspiration biopsy of the thyroid, your appointment may be scheduled by calling [phone].  Be sure to specify that the procedure is to be ultrasound-guided and that it is to be scheduled in radiology n      No diagnosis found.  Orders Placed This Encounter   Procedures     ThyGeNEXT     Jamison Wallace  MD        Again, thank you for allowing me to participate in the care of your patient.        Sincerely,        Jamison Wallace MD

## 2024-01-12 NOTE — TELEPHONE ENCOUNTER
Patient call:     Appointment type: New Endocrine   Provider: Eri   Return date: 1/12   Speciality phone number: [phone]  Additional appointment(s) needed: N/A  Additional notes: Spoke to pt cold not find in person at preferred location scheduled virtual today with eri per Dr. Collier triage notes     To schedulers : please offer to move forward on schedule with either  Charly Mathur, Jordyn, Eri Wilson, Jasmeet Keene Herrera, Amira Thompson, Yuridia using NON-CALL week open new/LA (60 minutes) or 2 back to back 30 minute LA spaces     Janine Sorenson on 1/12/2024 at 8:20 AM

## 2024-01-12 NOTE — NURSING NOTE
Is the patient currently in the state of [place]? YES    Visit mode:VIDEO    If the visit is dropped, the patient can be reconnected by: VIDEO VISIT: Text to cell phone:   Telephone Information:   Mobile [phone]       Will anyone else be joining the visit? NO  (If patient encounters technical issues they should call [phone])    How would you like to obtain your AVS? MyChart    Are changes needed to the allergy or medication list? No    Reason for visit: Consult    Shelby Kocher VVF

## 2024-01-15 ENCOUNTER — TELEPHONE (OUTPATIENT)
Dept: MULTI SPECIALTY CLINIC | Facility: CLINIC | Age: 84
End: 2024-01-15

## 2024-01-23 DIAGNOSIS — J44.9 COPD (CHRONIC OBSTRUCTIVE PULMONARY DISEASE) (H): Primary | ICD-10-CM

## 2024-02-18 ENCOUNTER — HEALTH MAINTENANCE LETTER (OUTPATIENT)
Age: 84
End: 2024-02-18

## 2024-03-18 ENCOUNTER — MYC MEDICAL ADVICE (OUTPATIENT)
Dept: ENDOCRINOLOGY | Facility: CLINIC | Age: 84
End: 2024-03-18
Payer: MEDICARE

## 2024-03-18 DIAGNOSIS — J43.9 PULMONARY EMPHYSEMA, UNSPECIFIED EMPHYSEMA TYPE (H): Primary | Chronic | ICD-10-CM

## 2024-03-18 DIAGNOSIS — C50.211 MALIGNANT NEOPLASM OF UPPER-INNER QUADRANT OF RIGHT BREAST IN FEMALE, ESTROGEN RECEPTOR NEGATIVE (H): ICD-10-CM

## 2024-03-18 DIAGNOSIS — E04.1 COLLOID THYROID NODULE: ICD-10-CM

## 2024-03-18 DIAGNOSIS — F33.42 MAJOR DEPRESSION, RECURRENT, FULL REMISSION (H): ICD-10-CM

## 2024-03-18 DIAGNOSIS — Z17.1 MALIGNANT NEOPLASM OF UPPER-INNER QUADRANT OF RIGHT BREAST IN FEMALE, ESTROGEN RECEPTOR NEGATIVE (H): ICD-10-CM

## 2024-03-18 DIAGNOSIS — M54.50 ACUTE MIDLINE LOW BACK PAIN WITHOUT SCIATICA: ICD-10-CM

## 2024-03-19 ENCOUNTER — TELEPHONE (OUTPATIENT)
Dept: ENDOCRINOLOGY | Facility: CLINIC | Age: 84
End: 2024-03-19
Payer: MEDICARE

## 2024-03-19 NOTE — TELEPHONE ENCOUNTER
Spoke to [employer]- and verified provider did want them to run the specimen. They will get the specimen and start the process.    Negative

## 2024-03-19 NOTE — TELEPHONE ENCOUNTER
[facility]    Phone Message    May a detailed message be left on voicemail: yes     Reason for Call: Other: [facility] received fax that included an order to run their molecular testing but also included another report for molecular testing from another entity. She's wanting to verify if you want them to run the molecular test with same specimen? It may not be covered by patient's insurance if it's ran again? Please call to discuss.

## 2024-03-20 NOTE — TELEPHONE ENCOUNTER
[facility]    Phone Message    May a detailed message be left on voicemail: yes     Reason for Call: Other: Pt requesting call back to verify if she should have her appt this coming Friday with Dr. Wallace or not due to pt not being able to get her testing done

## 2024-03-21 NOTE — TELEPHONE ENCOUNTER
Patient is calling in again requesting a call back to discuss if she needs to cancel her VV for 3/22. Stated she has not received a call back at this time. Please call her at [phone] Thank you

## 2024-03-21 NOTE — PROGRESS NOTES
Video-Visit Details    Type of service:  Video Visit  Video Start Time: 0905  Video End Time: 0940  Originating Location (pt. Location): Home, [place]  Distant Location (provider location):  Home  Platform used for Video Visit: Rochelle Wallace MD      HISTORY OF PRESENT ILLNESS  Jody Ch is a 83 year old female with h/o breast cancer, chronic cough, pharyngeal dysphagia, MDD who is here for initial evaluation and management of thyroid nodule.    Interval History  ThyGeNEXT/ThyraMIR from FNA 12/14/23, had issue with sending specimen.  Fax was sent for specimen to be processed on Tuesday 3/19/24  Pt has ongoing hoarseness  She has port on the left side and sometimes has discomfort, which she is not sure it it is from thyroid or port  Otherwise no issues.    Pt also takes care of her , and for both of them, navigating through health care appointments are very challenging      Initial visit  Pt has breast cancer and had a scan and incidentally found of thyroid nodule.  FNA 2018.  Pt moved and re-established  care with PCP, got US, nodule has grown and got a repeat FNA.  New hoarsness for >1 year, comes and goes  Choking with swallowing, got test at [facility]. Swallowing problem with liquid, and solid food. Eg. Hard boil egg, feeling like food stuck and need to drink water to follow  No changes in breathing    Brother s/p thyroid surgery  Cousin with thyroid problems    Currently breast cancer, 5 years in remission    REVIEW OF SYSTEMS  10 point negative except as mentioned in HPI    Past Medical/Surgical History:  Past Medical History:   Diagnosis Date    Anxiety state 01/31/2017    Arthritis     Atrophic vaginitis 05/08/2014    Overview:  UPDATE: Followed-up with Dr. Cassie Arteaga on 5-8-14. Noted that patient did not want to pay for the estrogen cream. Impression female stress incontinence and atrophic vaginitis.    Bilateral leg paresthesia 05/13/2014    Overview:  US FRANCISCO W EXERCISE BILATERAL  5-14-14: IMPRESSION: RIGHT LOWER EXTREMITY: FRANCISCO at rest is normal with a normal response to exercise. These findings would not be consistent with symptoms of arterial claudication. LEFT LOWER EXTREMITY: FRANCISCO at rest is normal with a normal response to exercise. These findings would not be consistent with symptoms of arterial claudication. US VENOUS LOWER EXTREM    Bone pain 07/24/2018    Breast cancer (H)     Breast cancer, stage 2, right (H) 05/16/2018    Overview:  Added automatically from request for surgery [id number]    Chronic cough 02/12/2018    Overview:  XR CHEST 2 VIEWS PA AND LATERAL 12-4-17: Normal heart size and pulmonary vascularity. Tiny granulomas with some fibrosis in the right lung base. The left lung is clear. Slight deviation of the upper trachea from left to right presumably due to thyroid enlargement stable. No change from previous. No acute process is identified. No focal pulmonary infiltrates.    Chronic diarrhea 05/12/2017    Overview:  UPDATE: COLONOSCOPY DIAGNOSTIC 7-25-17: Aphthous ulcer of ileum. Diverticulosis of colon without diverticulitis. Pathology Results: NEOTERMINAL ILEUM,  BIOPSY: Nonspecific chronic active ileitis. No dysplasia or malignancy. COLON, RANDOM, BIOPSY: Normal colonic mucosa STOOL TESTS on 5-12-17 for culture, Clostridium dificile toxin, WBC, Giardia antigen, Campylobacter, Shiga toxin, Stool f    Chronic pain of right knee 10/23/2017    Overview:  XR KNEE 3 VIEWS RIGHT 10-23-17: Negative knee. No fracture or dislocation. No joint effusion.    Chronic rhinitis 06/08/2017    Chronic right hip pain 10/23/2017    Overview:  XR HIP 2 OR 3 VIEWS W PELVIS RIGHT 10-23-17: Arthritic change right hip. Pelvis otherwise negative.    Decreased range of motion of right shoulder 10/08/2018    Depressive disorder     Diarrhea 07/24/2018    Difficult or painful urination 12/08/2009    Overview:  UPDATE: Followed-up with Dr. Cassie Arteaga on 5-8-14. Noted that patient did not want to pay  for the estrogen cream. Impression female stress incontinence and atrophic vaginitis. Exacerbation of her dysuria symptoms. She has not used her estrace vaginal cream due to cost noted 9-10-13. Urinalysis negative on 9-10-13. Urinalysis and urine microscopy showed some signs of urinary tract infecti    Drug-induced nausea and vomiting 07/17/2018    Dry mouth 05/24/2016    Gallbladder polyp 04/14/2012    Overview:  UPDATE:US ABDOMEN LIMITED RUQ 4-18-16: 5 mm stone versus polyp in the gallbladder, decreased in size since comparison study where measured 7 mm. The gallbladder is contracted despite being NPO, which limits evaluation. Benign parapelvic cyst in the lower pole of the right kidney measuring 2.8 cm, is unchanged. US ABDOMEN LIMITED RUQ on 2-3-14:  Stable appearance of a 8 mm cholesterol tristin    Ganglion cyst of flexor tendon sheath of finger of right hand 05/24/2016    History of breast cancer 11/21/2006    Overview:  UPDATE: XR MAMMO UNI SCREEN FFDM RIGHT 4-14-14: ACR 1 Negative. Followed-up with oncologist Dr. Phyllis Colon on 4-29-14. Stable.    XR MAMMO UNI SCREEN FFDM RIGHT: 4-4-12, 4-5-13: ACR 2 Benign Finding. Followed-up with Dr. Phyllis Colon 5-23-13. CBC and CMP normal except for low glucose of 55 . CA 27-29 normal.   Followed-up with Dr. Phyllis Colon 11-16-12. CBC and CMP normal exce    Hypercholesteremia 12/08/2014    Overview:  UPDATE: Rx atorvastatin (Lipitor) 1-22-15. She sent medical message on 3-18-15 requesting to take atorvastatin (Lipitor) 20 mg tablet every other day. This would not work for atorvastatin (Lipitor) . I advised to take half tablet (10 mg) daily rather than taking one tablet every other day. ASCVD Risk  10 year risk 7.9 % calculated on 12/8/2014.  Recommendation moderate to high -    IC (interstitial cystitis) 12/02/2010    Overview:  UPDATE: UPDATE: Followed-up with Dr. Cassie Arteaga on 5-8-14. Noted that patient did not want to pay for the estrogen cream.  Impression female stress incontinence and atrophic vaginitis. Exacerbation of her dysuria symptoms. She has not used her estrace vaginal cream due to cost noted 9-10-13. Urinalysis negative on 9-10-13. Urinalysis and urine microscopy showed some signs of urinary tract    Ileitis 07/25/2017    Overview:  COLONOSCOPY DIAGNOSTIC 7-25-17: Aphthous ulcer of ileum. Diverticulosis of colon without diverticulitis. Pathology Results: NEOTERMINAL ILEUM,  BIOPSY: Nonspecific chronic active ileitis. No dysplasia or malignancy. COLON, RANDOM, BIOPSY: Normal colonic mucosa . Advised to follow-up with [facility] regarding chronic active ileitis.    Impaired fasting glucose 12/01/2006    Insomnia secondary to depression with anxiety 01/31/2017    Left leg swelling 05/13/2014    Overview:  US VENOUS LOWER EXTREMITY LEFT 5-14-14: Left leg veins are negative for DVT.    Left thyroid nodule 04/30/2018    Lymphedema of extremity 10/08/2018    Major depression, recurrent, full remission (H24) 01/31/2017    Overview:  Inpatient treatment for depression after divorce in 1983. Major depression diagnosed due to financial worries diagnosed 11-23-10 by oncologist, Louise Burns. Citalopram 20 mg daily started. Citalopram discontinued on 5-19-11 per patient request.    Malignant neoplasm of upper-inner quadrant of right breast in female, 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) 10/08/2018    Mixed stress and urge urinary incontinence 07/24/2006    Overview:  UPDATE: Followed-up with Dr. Cassie Arteaga on 5-8-14. Noted that patient did not want to pay for the estrogen cream. Impression female stress incontinence and atrophic vaginitis.  Exacerbation of her dysuria symptoms. She has not used her estrace vaginal cream due to cost noted 9-10-13. Urinalysis negative on 9-10-13. Urinalysis and urine microscopy showed some signs of urinary tract infect    Nasal congestion 06/05/2014    Osteopenia 11/01/2006    Overview:  UPDATE:  XR DXA BONE DENSITY 2 SITES AXIAL  5/16/2018: Osteopenia. Lumbar Spine L1-L4 T-score -1.8 . Mean Bilateral Femoral Neck T-score -1.4 . FRAX Results: 10-year probability of major osteoporotic fracture is 10.8%, and of hip fracture is 2%, based on left femoral neck BMD. Basic bone health recommended.  XR DXA BONE DENSITY 2 SITES AXIAL 4-18-16: T score AP spine -2.1, Left femoral ne    Other emphysema (H) 04/30/2018    Other specified disorders of nose and nasal sinuses 07/24/2018    Peripheral axonal neuropathy 11/22/2011    Overview:  UPDATE:  She thinks gabapentin (Neurontin) has been helpful without side effect of leg swelling discussed on 11-20-14. She agreed to increase dose to 300 mg at bedtime. She called on 10-30-14 requesting for gabapentin (Neurontin) as nortriptyline not helpful. Reminded her that she complained of leg swelling with gabapentin (Neurontin) in the past.  EMG 5-19-14: Borderline abnormal EMG du    Personal history of tobacco use, presenting hazards to health 10/22/2018    Overview:  20 pack years    Pharyngeal dysphagia 12/08/2009    Recurrent UTI 09/12/2013    Overview:  UPDATE: Followed-up with Dr. Cassie Arteaga on 5-8-14. Noted that patient did not want to pay for the estrogen cream. Impression female stress incontinence and atrophic vaginitis.  Exacerbation of her dysuria symptoms. She has not used her estrace vaginal cream due to cost noted 9-10-13. Urinalysis negative on 9-10-13. Urinalysis and urine microscopy showed some signs of urinary tract infect    Salivation excessive 12/17/2008    Overview:  may be due to postnasal drip causing excessive salivation as she tends to swallow the postnasal drainage.She is not candidate for tricyclic antidepressant like nortriptyline (can cause dry mouth) to lessen salivary secretions as this would excerbate her dry lips.    SOB (shortness of breath) 05/13/2014    Overview:  XR CHEST 2 VIEWS PA AND LATERAL 5-13-14: Impression: On the lateral view, a small patchy opacity is again  visualized and has a few linear markings. This may be chronic, it is suggested on the prior exam slightly more prominent but this is probably due to technique, could represent chronic areas of subsegmental volume loss or previous consolidation. It is not well visualized on the PA vie    Tremor, essential 11/22/2011    Overview:  Neurology consult Dr. Luther Armenta on 3-26-12. Impression essential tremor. MRI brain. Neurontin 100 mg bid started to help tremors and bilateral lower extremity paresthesia thought due to impaired FG or prediabetes. Normal EMG of lower extremity on 4-11-12 but compound motor action potentials low which can be seen in early axonal neuropathy. Developed swelling with gabapentin (Neurontin    Vitamin D insufficiency 12/03/2012    Overview:  Vitamin D was low at 28.1 on 12-3-12. Take vitamin D 3000 units daily for 8 weeks, then take vitamin D 2000 units indefinitely. Vitamin D was normal at 40.8 on 3-6-13. Continue vitamin D 2000 units indefinitely.    Vitreous degeneration 10/22/2018    Overview:  Right eye     Past Surgical History:   Procedure Laterality Date    ABDOMEN SURGERY      APPENDECTOMY      BLEPHAROPLASTY Bilateral 11/07/2013    FINGER GANGLION CYST EXCISION Right 06/23/2016    right ring finger    LUMPECTOMY BREAST  11/21/2006    Infiltrating ductal carcinoma, micropapillary variant, [place]    MASTECTOMY MODIFIED RADICAL Left 12/06/2006    OTHER SURGICAL HISTORY Right 11/07/2013    FL REMOVE EYELID LESN (NOT CHALAZION)    OTHER SURGICAL HISTORY Right 05/18/2018    US BIOPSY BREAST NEEDLE W BIBIANA W GUIDE RIGHT    OTHER SURGICAL HISTORY Right 06/01/2018    RIGHT SIMPLE MASTECTOMY WITH RIGHT SENTINAL LYMPH NODE LIXC8EE AND RIGHT AXILLARY NODE DISSECTION       Medications  Current Outpatient Medications   Medication    albuterol (ACCUNEB) 1.25 MG/3ML neb solution    alendronate (FOSAMAX) 70 MG tablet    calcium 600 MG tablet    Cholecalciferol (VITAMIN D3 PO)    Cyanocobalamin  (B-12) 1000 MCG TBCR    escitalopram (LEXAPRO) 10 MG tablet    Fluticasone-Umeclidin-Vilant (TRELEGY ELLIPTA) 100-62.5-25 MCG/ACT oral inhaler    omeprazole (PRILOSEC) 20 MG DR capsule     No current facility-administered medications for this visit.       Allergies  Allergies   Allergen Reactions    Cephalexin Shortness Of Breath     Bladder burning    Amitriptyline Unknown    Gabapentin Swelling     Shortness of breath    Latex Swelling and Other (See Comments)    Sulfa Antibiotics Unknown    Tetracyclines & Related Unknown         Family History  family history includes Alcoholism in her mother; Allergic rhinitis in her sister; Arthritis in her maternal grandmother; Asthma in her sister; Breast Cancer in her maternal cousin; Cancer in her maternal grandmother; Diabetes in her brother and brother; Hypertension in her brother, brother, maternal grandmother, and mother; Kidney Disease in her father; Osteoporosis in her maternal grandmother; Prostate Cancer in her father; Seizure Disorder in her father; Substance Abuse in her mother; Thyroid Disease in her brother; Ulcers in her mother.    Social History  Social History     Tobacco Use    Smoking status: Former     Packs/day: 0.50     Years: 40.00     Additional pack years: 0.00     Total pack years: 20.00     Types: Cigarettes     Quit date: 2005     Years since quittin.1    Smokeless tobacco: Never   Substance Use Topics    Alcohol use: No     Comment: Alcoholic Drinks/day: occassionaly        Physical Exam  There were no vitals taken for this visit.  There is no height or weight on file to calculate BMI.  GENERAL :  In no apparent distress  NEURO: awake, alert, responds appropriately to questions.      DATA REVIEW  Labs/Imaging  NA Cytology APN (2018 10:44 AM CDT)  Lab Results - [facility] Cytology APN (2018 10:44 AM CDT)  Component Value Ref Range Test Method Analysis Time Performed At Pathologist Signature   Case Report Medical Cytology Report                            Case: [id number]                                  Authorizing Provider:  Phyllis Colon MD Collected:           07/03/2018 1044              Ordering Location:     [facility]    Received:            07/03/2018 1044                                    Imaging                                                                      Pathologist:           Sultana Levin MD                                                        Specimen:    Left Thyroid                                                                             07/05/2018 11:19 AM CDT [facility] LABORATORY-CENTRAL LABORATORY     Final Diagnosis A) THYROID, LEFT, ULTRASOUND-GUIDED FINE NEEDLE ASPIRATION:  1. Benign colloid nodule  2. Negative for malignancy in this sample  3. See microscopic description and comment          [facility] MEDICAL IMAGING   US THYROID/PARATHYROID   7/3/2018 10:47 AM     INDICATION: Thyroid nodule.   TECHNIQUE: Routine.   COMPARISON: CT 6/13/2018.     FINDINGS:   RIGHT thyroid lobe measures 3.6 x 1.3 x 1.2 cm. Normal echotexture with no focal   nodule.    LEFT thyroid lobe measures 5.0 x 4.1 x 3.9 cm. Solid nodule involving most of   the mid left lobe of the thyroid.   Nodule: 4.4 x 3.8 x 3.4 cm   Composition: Solid or almost completely solid 2   Echogenicity: Hyperechoic or isoechoic 1   Shape: Wider-than-tall 0   Margin: Smooth 0   Echogenic Foci: None, or large comet-tail artifacts 0   Point Total: 3 points. TI-RADS 3. FNA if >=2.5 cm. Follow if >=1.5 cm.     Isthmus measures 3 mm. No additional findings.     No cervical lymphadenopathy.     CONCLUSION:   1. Nodule in the left lobe of the thyroid meets criteria at which fine-needle   aspiration is suggested. This corresponds to the CT and PET scan findings. See   biopsy report.     Study Result    Narrative & Impression   EXAM: US THYROID  LOCATION: [facility]  DATE: 10/27/2023     INDICATION:  Thyroid  "nodule  COMPARISON: 11/8/2022  TECHNIQUE: Thyroid ultrasound.      FINDINGS:  RIGHT lobe: 3.4 x 1.4 x 1.4 cm. Homogeneous echotexture.  Isthmus: 0.4 mm.  LEFT lobe: 5.9 x 4.5 x 3.9 cm. Homogeneous echotexture.     NECK: No cervical lymphadenopathy.     NODULES:     Nodule 1: Right superior, 0.7 x 0.5 x 0.4 cm, previously 0.4 x 0.5 x 0.5 cm   Composition: Solid or almost completely solid, 2 points   Echogenicity: Hyperechoic or isoechoic, 1 point   Shape: Wider-than-tall, 0 points   Margin: Smooth, 0 points   Echogenic Foci: None, or large comet-tail artifacts, 0 points   Point Total: 3 points. TI-RADS 3. If 2.5 cm or larger, recommend FNA; if 1.5 cm or larger, recommend follow up US at 1, 3, and 5 years.      Nodule 2: Left lobe, 5.1 x 4.1 x 4.1 cm, previously 4.7 x 3.7 x 4.4 cm  Composition: Solid or almost completely solid, 2 points   Echogenicity: Hyperechoic or isoechoic, 1 point   Shape: Not taller than wide, 0 points   Margin: Smooth, 0 points   Echogenic Foci: None, or large comet-tail artifacts, 0 points   Point Total: 3 points. TI-RADS 3. If 2.5 cm or larger, recommend FNA; if 1.5 cm or larger, recommend follow up US at 1, 3, and 5 years.                                                                      IMPRESSION: Slight increase in size of dominant left thyroid nodule compared to 11/8/2022. Previous biopsy in 2018 demonstrated a benign colloid nodule. No other enlarging nodules.     TSH   Date Value Ref Range Status   10/13/2023 0.81 0.30 - 4.20 uIU/mL Final     No results found for: \"T4\"]  Lab Results   Component Value Date    A1C 5.5 11/02/2023 12/14/2023  2:02 PM       Status: Final result       Visible to patient: Yes (seen)       Dx: Thyroid nodule    4 Result Notes       2 Patient Communications       Component  Ref Range & Units  Resulting Agency   Final Diagnosis   Specimen A                 Interpretation:                   Atypia of Undetermined Significance (AUS)      ULTRASOUND-GUIDED " NEEDLE ASPIRATION OF NODULE, LEFT LOBE OF THYROID GLAND:        -  ATYPICAL CELLS PRESENT (B[place]ES[place] SYSTEM CATEGORY 3, ATYPIA OF UNDETERMINED SIGNIFICANCE)        -  NEGATIVE FOR CYTOLOGICALLY MALIGNANT CELLS                 Adequacy:                 Satisfactory for evaluation               ASSESSMENT/PLAN:   ## Thyroid nodules, 5.1 cm FNA AUS 12/2023, Afirma suspicious, ROM 50% (previously FNA benign 2018)  ## Mild obstructive symptoms  She has mild obstructive symptoms which are tolerable for now  She is still unclear if she wants surgery given her age  We discussed that in general, for Afirma suspicious, we recommend surgery.  Given her hesitant to surgery, will check ThyGeNEXT/ThyraMIR.  If result is benign, will continue to monitor.  If result is borderline, will repeat US and FNA  If result is concern for cancer, will proceed with surgery  Pt is agreeable with plan.  For her obstructive symptoms, if result supports benign, pt is interested in RFA.  -- ThyGeNEXT/ThyraMIR from [facility]NA 12/14/23, message sent to follow up on the process next week  -- will send a message to Dr. Richard, patient need  to help with her appointments    Follow-up 3 months        Jamison Wallace MD

## 2024-03-22 ENCOUNTER — VIRTUAL VISIT (OUTPATIENT)
Dept: ENDOCRINOLOGY | Facility: CLINIC | Age: 84
End: 2024-03-22
Payer: MEDICARE

## 2024-03-22 VITALS — HEIGHT: 62 IN | BODY MASS INDEX: 29.08 KG/M2 | WEIGHT: 158 LBS

## 2024-03-22 DIAGNOSIS — E04.1 THYROID NODULE: Primary | ICD-10-CM

## 2024-03-22 PROCEDURE — 99215 OFFICE O/P EST HI 40 MIN: CPT | Mod: 95 | Performed by: INTERNAL MEDICINE

## 2024-03-22 PROCEDURE — G2211 COMPLEX E/M VISIT ADD ON: HCPCS | Mod: 95 | Performed by: INTERNAL MEDICINE

## 2024-03-22 ASSESSMENT — PAIN SCALES - GENERAL: PAINLEVEL: NO PAIN (0)

## 2024-03-22 NOTE — LETTER
3/22/2024         RE: Jody Ch  [address]  [address]  [place] [address]        Dear Colleague,    Thank you for referring your patient, Jody Ch, to the [facility] [place]. Please see a copy of my visit note below.    Video-Visit Details    Type of service:  Video Visit  Video Start Time: 0905  Video End Time: 0940  Originating Location (pt. Location): Home, [place]  Distant Location (provider location):  Home  Platform used for Video Visit: Rochelle Wallace MD      HISTORY OF PRESENT ILLNESS  Jody Ch is a 83 year old female with h/o breast cancer, chronic cough, pharyngeal dysphagia, MDD who is here for initial evaluation and management of thyroid nodule.    Interval History  ThyGeNEXT/ThyraMIR from FNA 12/14/23, had issue with sending specimen.  Fax was sent for specimen to be processed on Tuesday 3/19/24  Pt has ongoing hoarseness  She has port on the left side and sometimes has discomfort, which she is not sure it it is from thyroid or port  Otherwise no issues.    Pt also takes care of her , and for both of them, navigating through health care appointments are very challenging      Initial visit  Pt has breast cancer and had a scan and incidentally found of thyroid nodule.  FNA 2018.  Pt moved and re-established  care with PCP, got US, nodule has grown and got a repeat FNA.  New hoarsness for >1 year, comes and goes  Choking with swallowing, got test at [facility]. Swallowing problem with liquid, and solid food. Eg. Hard boil egg, feeling like food stuck and need to drink water to follow  No changes in breathing    Brother s/p thyroid surgery  Cousin with thyroid problems    Currently breast cancer, 5 years in remission    REVIEW OF SYSTEMS  10 point negative except as mentioned in HPI    Past Medical/Surgical History:  Past Medical History:   Diagnosis Date     Anxiety state 01/31/2017     Arthritis      Atrophic vaginitis  05/08/2014    Overview:  UPDATE: Followed-up with Dr. Cassie Arteaga on 5-8-14. Noted that patient did not want to pay for the estrogen cream. Impression female stress incontinence and atrophic vaginitis.     Bilateral leg paresthesia 05/13/2014    Overview:  US FRANCISCO W EXERCISE BILATERAL 5-14-14: IMPRESSION: RIGHT LOWER EXTREMITY: FRANCISCO at rest is normal with a normal response to exercise. These findings would not be consistent with symptoms of arterial claudication. LEFT LOWER EXTREMITY: FRANCISCO at rest is normal with a normal response to exercise. These findings would not be consistent with symptoms of arterial claudication. US VENOUS LOWER EXTREM     Bone pain 07/24/2018     Breast cancer (H)      Breast cancer, stage 2, right (H) 05/16/2018    Overview:  Added automatically from request for surgery [id number]     Chronic cough 02/12/2018    Overview:  XR CHEST 2 VIEWS PA AND LATERAL 12-4-17: Normal heart size and pulmonary vascularity. Tiny granulomas with some fibrosis in the right lung base. The left lung is clear. Slight deviation of the upper trachea from left to right presumably due to thyroid enlargement stable. No change from previous. No acute process is identified. No focal pulmonary infiltrates.     Chronic diarrhea 05/12/2017    Overview:  UPDATE: COLONOSCOPY DIAGNOSTIC 7-25-17: Aphthous ulcer of ileum. Diverticulosis of colon without diverticulitis. Pathology Results: NEOTERMINAL ILEUM,  BIOPSY: Nonspecific chronic active ileitis. No dysplasia or malignancy. COLON, RANDOM, BIOPSY: Normal colonic mucosa STOOL TESTS on 5-12-17 for culture, Clostridium dificile toxin, WBC, Giardia antigen, Campylobacter, Shiga toxin, Stool f     Chronic pain of right knee 10/23/2017    Overview:  XR KNEE 3 VIEWS RIGHT 10-23-17: Negative knee. No fracture or dislocation. No joint effusion.     Chronic rhinitis 06/08/2017     Chronic right hip pain 10/23/2017    Overview:  XR HIP 2 OR 3 VIEWS W PELVIS RIGHT 10-23-17: Arthritic change  right hip. Pelvis otherwise negative.     Decreased range of motion of right shoulder 10/08/2018     Depressive disorder      Diarrhea 07/24/2018     Difficult or painful urination 12/08/2009    Overview:  UPDATE: Followed-up with Dr. Cassie Arteaga on 5-8-14. Noted that patient did not want to pay for the estrogen cream. Impression female stress incontinence and atrophic vaginitis. Exacerbation of her dysuria symptoms. She has not used her estrace vaginal cream due to cost noted 9-10-13. Urinalysis negative on 9-10-13. Urinalysis and urine microscopy showed some signs of urinary tract infecti     Drug-induced nausea and vomiting 07/17/2018     Dry mouth 05/24/2016     Gallbladder polyp 04/14/2012    Overview:  UPDATE:US ABDOMEN LIMITED RUQ 4-18-16: 5 mm stone versus polyp in the gallbladder, decreased in size since comparison study where measured 7 mm. The gallbladder is contracted despite being NPO, which limits evaluation. Benign parapelvic cyst in the lower pole of the right kidney measuring 2.8 cm, is unchanged. US ABDOMEN LIMITED RUQ on 2-3-14:  Stable appearance of a 8 mm cholesterol tristin     Ganglion cyst of flexor tendon sheath of finger of right hand 05/24/2016     History of breast cancer 11/21/2006    Overview:  UPDATE: XR MAMMO UNI SCREEN FFDM RIGHT 4-14-14: ACR 1 Negative. Followed-up with oncologist Dr. Phyllis Colon on 4-29-14. Stable.    XR MAMMO UNI SCREEN FFDM RIGHT: 4-4-12, 4-5-13: ACR 2 Benign Finding. Followed-up with Dr. Phyllis Colon 5-23-13. CBC and CMP normal except for low glucose of 55 . CA 27-29 normal.   Followed-up with Dr. Phyllis Colon 11-16-12. CBC and CMP normal exce     Hypercholesteremia 12/08/2014    Overview:  UPDATE: Rx atorvastatin (Lipitor) 1-22-15. She sent medical message on 3-18-15 requesting to take atorvastatin (Lipitor) 20 mg tablet every other day. This would not work for atorvastatin (Lipitor) . I advised to take half tablet (10 mg) daily rather than taking one  tablet every other day. ASCVD Risk  10 year risk 7.9 % calculated on 12/8/2014.  Recommendation moderate to high -     IC (interstitial cystitis) 12/02/2010    Overview:  UPDATE: UPDATE: Followed-up with Dr. Cassie Arteaga on 5-8-14. Noted that patient did not want to pay for the estrogen cream. Impression female stress incontinence and atrophic vaginitis. Exacerbation of her dysuria symptoms. She has not used her estrace vaginal cream due to cost noted 9-10-13. Urinalysis negative on 9-10-13. Urinalysis and urine microscopy showed some signs of urinary tract     Ileitis 07/25/2017    Overview:  COLONOSCOPY DIAGNOSTIC 7-25-17: Aphthous ulcer of ileum. Diverticulosis of colon without diverticulitis. Pathology Results: NEOTERMINAL ILEUM,  BIOPSY: Nonspecific chronic active ileitis. No dysplasia or malignancy. COLON, RANDOM, BIOPSY: Normal colonic mucosa . Advised to follow-up with [place] [facility] regarding chronic active ileitis.     Impaired fasting glucose 12/01/2006     Insomnia secondary to depression with anxiety 01/31/2017     Left leg swelling 05/13/2014    Overview:  US VENOUS LOWER EXTREMITY LEFT 5-14-14: Left leg veins are negative for DVT.     Left thyroid nodule 04/30/2018     Lymphedema of extremity 10/08/2018     Major depression, recurrent, full remission (H24) 01/31/2017    Overview:  Inpatient treatment for depression after divorce in 1983. Major depression diagnosed due to financial worries diagnosed 11-23-10 by oncologist, Louise Burns. Citalopram 20 mg daily started. Citalopram discontinued on 5-19-11 per patient request.     Malignant neoplasm of upper-inner quadrant of right breast in female, 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) 10/08/2018     Mixed stress and urge urinary incontinence 07/24/2006    Overview:  UPDATE: Followed-up with Dr. Cassie Arteaga on 5-8-14. Noted that patient did not want to pay for the estrogen cream. Impression female stress incontinence and atrophic vaginitis.  Exacerbation of her  dysuria symptoms. She has not used her estrace vaginal cream due to cost noted 9-10-13. Urinalysis negative on 9-10-13. Urinalysis and urine microscopy showed some signs of urinary tract infect     Nasal congestion 06/05/2014     Osteopenia 11/01/2006    Overview:  UPDATE:  XR DXA BONE DENSITY 2 SITES AXIAL 5/16/2018: Osteopenia. Lumbar Spine L1-L4 T-score -1.8 . Mean Bilateral Femoral Neck T-score -1.4 . FRAX Results: 10-year probability of major osteoporotic fracture is 10.8%, and of hip fracture is 2%, based on left femoral neck BMD. Basic bone health recommended.  XR DXA BONE DENSITY 2 SITES AXIAL 4-18-16: T score AP spine -2.1, Left femoral ne     Other emphysema (H) 04/30/2018     Other specified disorders of nose and nasal sinuses 07/24/2018     Peripheral axonal neuropathy 11/22/2011    Overview:  UPDATE:  She thinks gabapentin (Neurontin) has been helpful without side effect of leg swelling discussed on 11-20-14. She agreed to increase dose to 300 mg at bedtime. She called on 10-30-14 requesting for gabapentin (Neurontin) as nortriptyline not helpful. Reminded her that she complained of leg swelling with gabapentin (Neurontin) in the past.  EMG 5-19-14: Borderline abnormal EMG du     Personal history of tobacco use, presenting hazards to health 10/22/2018    Overview:  20 pack years     Pharyngeal dysphagia 12/08/2009     Recurrent UTI 09/12/2013    Overview:  UPDATE: Followed-up with Dr. Cassie Arteaga on 5-8-14. Noted that patient did not want to pay for the estrogen cream. Impression female stress incontinence and atrophic vaginitis.  Exacerbation of her dysuria symptoms. She has not used her estrace vaginal cream due to cost noted 9-10-13. Urinalysis negative on 9-10-13. Urinalysis and urine microscopy showed some signs of urinary tract infect     Salivation excessive 12/17/2008    Overview:  may be due to postnasal drip causing excessive salivation as she tends to swallow the postnasal drainage.She is not  candidate for tricyclic antidepressant like nortriptyline (can cause dry mouth) to lessen salivary secretions as this would excerbate her dry lips.     SOB (shortness of breath) 05/13/2014    Overview:  XR CHEST 2 VIEWS PA AND LATERAL 5-13-14: Impression: On the lateral view, a small patchy opacity is again visualized and has a few linear markings. This may be chronic, it is suggested on the prior exam slightly more prominent but this is probably due to technique, could represent chronic areas of subsegmental volume loss or previous consolidation. It is not well visualized on the PA vie     Tremor, essential 11/22/2011    Overview:  Neurology consult Dr. Luther Armenta on 3-26-12. Impression essential tremor. MRI brain. Neurontin 100 mg bid started to help tremors and bilateral lower extremity paresthesia thought due to impaired FG or prediabetes. Normal EMG of lower extremity on 4-11-12 but compound motor action potentials low which can be seen in early axonal neuropathy. Developed swelling with gabapentin (Neurontin     Vitamin D insufficiency 12/03/2012    Overview:  Vitamin D was low at 28.1 on 12-3-12. Take vitamin D 3000 units daily for 8 weeks, then take vitamin D 2000 units indefinitely. Vitamin D was normal at 40.8 on 3-6-13. Continue vitamin D 2000 units indefinitely.     Vitreous degeneration 10/22/2018    Overview:  Right eye     Past Surgical History:   Procedure Laterality Date     ABDOMEN SURGERY       APPENDECTOMY       BLEPHAROPLASTY Bilateral 11/07/2013     FINGER GANGLION CYST EXCISION Right 06/23/2016    right ring finger     LUMPECTOMY BREAST  11/21/2006    Infiltrating ductal carcinoma, micropapillary variant, Janey     MASTECTOMY MODIFIED RADICAL Left 12/06/2006     OTHER SURGICAL HISTORY Right 11/07/2013    GA REMOVE EYELID LESN (NOT CHALAZION)     OTHER SURGICAL HISTORY Right 05/18/2018    US BIOPSY BREAST NEEDLE W BIBIANA W GUIDE RIGHT     OTHER SURGICAL HISTORY Right 06/01/2018    RIGHT  SIMPLE MASTECTOMY WITH RIGHT SENTINAL LYMPH NODE AWFI1IK AND RIGHT AXILLARY NODE DISSECTION       Medications  Current Outpatient Medications   Medication     albuterol (ACCUNEB) 1.25 MG/3ML neb solution     alendronate (FOSAMAX) 70 MG tablet     calcium 600 MG tablet     Cholecalciferol (VITAMIN D3 PO)     Cyanocobalamin (B-12) 1000 MCG TBCR     escitalopram (LEXAPRO) 10 MG tablet     Fluticasone-Umeclidin-Vilant (TRELEGY ELLIPTA) 100-62.5-25 MCG/ACT oral inhaler     omeprazole (PRILOSEC) 20 MG DR capsule     No current facility-administered medications for this visit.       Allergies  Allergies   Allergen Reactions     Cephalexin Shortness Of Breath     Bladder burning     Amitriptyline Unknown     Gabapentin Swelling     Shortness of breath     Latex Swelling and Other (See Comments)     Sulfa Antibiotics Unknown     Tetracyclines & Related Unknown         Family History  family history includes Alcoholism in her mother; Allergic rhinitis in her sister; Arthritis in her maternal grandmother; Asthma in her sister; Breast Cancer in her maternal cousin; Cancer in her maternal grandmother; Diabetes in her brother and brother; Hypertension in her brother, brother, maternal grandmother, and mother; Kidney Disease in her father; Osteoporosis in her maternal grandmother; Prostate Cancer in her father; Seizure Disorder in her father; Substance Abuse in her mother; Thyroid Disease in her brother; Ulcers in her mother.    Social History  Social History     Tobacco Use     Smoking status: Former     Packs/day: 0.50     Years: 40.00     Additional pack years: 0.00     Total pack years: 20.00     Types: Cigarettes     Quit date: 2005     Years since quittin.1     Smokeless tobacco: Never   Substance Use Topics     Alcohol use: No     Comment: Alcoholic Drinks/day: occassionaly        Physical Exam  There were no vitals taken for this visit.  There is no height or weight on file to calculate BMI.  GENERAL :  In no  apparent distress  NEURO: awake, alert, responds appropriately to questions.      DATA REVIEW  Labs/Imaging  NA Cytology APN (07/03/2018 10:44 AM CDT)  Lab Results - FNA Cytology APN (07/03/2018 10:44 AM CDT)  Component Value Ref Range Test Method Analysis Time Performed At Pathologist Signature   Case Report Medical Cytology Report                           Case: [id number]                                  Authorizing Provider:  Phyllis Colon MD Collected:           07/03/2018 1044              Ordering Location:     [facility]    Received:            07/03/2018 1044                                    Imaging                                                                      Pathologist:           Sultana Levin MD                                                        Specimen:    Left Thyroid                                                                             07/05/2018 11:19 AM CDT [facility]     Final Diagnosis A) THYROID, LEFT, ULTRASOUND-GUIDED FINE NEEDLE ASPIRATION:  1. Benign colloid nodule  2. Negative for malignancy in this sample  3. See microscopic description and comment          [facility]   US THYROID/PARATHYROID   7/3/2018 10:47 AM     INDICATION: Thyroid nodule.   TECHNIQUE: Routine.   COMPARISON: CT 6/13/2018.     FINDINGS:   RIGHT thyroid lobe measures 3.6 x 1.3 x 1.2 cm. Normal echotexture with no focal   nodule.    LEFT thyroid lobe measures 5.0 x 4.1 x 3.9 cm. Solid nodule involving most of   the mid left lobe of the thyroid.   Nodule: 4.4 x 3.8 x 3.4 cm   Composition: Solid or almost completely solid 2   Echogenicity: Hyperechoic or isoechoic 1   Shape: Wider-than-tall 0   Margin: Smooth 0   Echogenic Foci: None, or large comet-tail artifacts 0   Point Total: 3 points. TI-RADS 3. FNA if >=2.5 cm. Follow if >=1.5 cm.     Isthmus measures 3 mm. No additional findings.     No cervical lymphadenopathy.     CONCLUSION:  "  1. Nodule in the left lobe of the thyroid meets criteria at which fine-needle   aspiration is suggested. This corresponds to the CT and PET scan findings. See   biopsy report.     Study Result    Narrative & Impression   EXAM: US THYROID  LOCATION: [facility]  DATE: 10/27/2023     INDICATION:  Thyroid nodule  COMPARISON: 11/8/2022  TECHNIQUE: Thyroid ultrasound.      FINDINGS:  RIGHT lobe: 3.4 x 1.4 x 1.4 cm. Homogeneous echotexture.  Isthmus: 0.4 mm.  LEFT lobe: 5.9 x 4.5 x 3.9 cm. Homogeneous echotexture.     NECK: No cervical lymphadenopathy.     NODULES:     Nodule 1: Right superior, 0.7 x 0.5 x 0.4 cm, previously 0.4 x 0.5 x 0.5 cm   Composition: Solid or almost completely solid, 2 points   Echogenicity: Hyperechoic or isoechoic, 1 point   Shape: Wider-than-tall, 0 points   Margin: Smooth, 0 points   Echogenic Foci: None, or large comet-tail artifacts, 0 points   Point Total: 3 points. TI-RADS 3. If 2.5 cm or larger, recommend FNA; if 1.5 cm or larger, recommend follow up US at 1, 3, and 5 years.      Nodule 2: Left lobe, 5.1 x 4.1 x 4.1 cm, previously 4.7 x 3.7 x 4.4 cm  Composition: Solid or almost completely solid, 2 points   Echogenicity: Hyperechoic or isoechoic, 1 point   Shape: Not taller than wide, 0 points   Margin: Smooth, 0 points   Echogenic Foci: None, or large comet-tail artifacts, 0 points   Point Total: 3 points. TI-RADS 3. If 2.5 cm or larger, recommend FNA; if 1.5 cm or larger, recommend follow up US at 1, 3, and 5 years.                                                                      IMPRESSION: Slight increase in size of dominant left thyroid nodule compared to 11/8/2022. Previous biopsy in 2018 demonstrated a benign colloid nodule. No other enlarging nodules.     TSH   Date Value Ref Range Status   10/13/2023 0.81 0.30 - 4.20 uIU/mL Final     No results found for: \"T4\"]  Lab Results   Component Value Date    A1C 5.5 11/02/2023 12/14/2023  2:02 PM     "   Status: Final result       Visible to patient: Yes (seen)       Dx: Thyroid nodule    4 Result Notes       2 Patient Communications       Component  Ref Range & Units  Resulting Agency   Final Diagnosis   Specimen A                 Interpretation:                   Atypia of Undetermined Significance (AUS)      ULTRASOUND-GUIDED NEEDLE ASPIRATION OF NODULE, LEFT LOBE OF THYROID GLAND:        -  ATYPICAL CELLS PRESENT (BETHESDA SYSTEM CATEGORY 3, ATYPIA OF UNDETERMINED SIGNIFICANCE)        -  NEGATIVE FOR CYTOLOGICALLY MALIGNANT CELLS                 Adequacy:                 Satisfactory for evaluation               ASSESSMENT/PLAN:   ## Thyroid nodules, 5.1 cm FNA AUS 12/2023, Afirma suspicious, ROM 50% (previously FNA benign 2018)  ## Mild obstructive symptoms  She has mild obstructive symptoms which are tolerable for now  She is still unclear if she wants surgery given her age  We discussed that in general, for Afirma suspicious, we recommend surgery.  Given her hesitant to surgery, will check ThyGeNEXT/ThyraMIR.  If result is benign, will continue to monitor.  If result is borderline, will repeat US and FNA  If result is concern for cancer, will proceed with surgery  Pt is agreeable with plan.  For her obstructive symptoms, if result supports benign, pt is interested in RFA.  -- ThyGeNEXT/ThyraMIR from FNA 12/14/23, message sent to follow up on the process next week  -- will send a message to Dr. Richard, patient need  to help with her appointments    Follow-up 3 months        Jamison Wallace MD      Again, thank you for allowing me to participate in the care of your patient.        Sincerely,        Jamison Wallace MD

## 2024-03-22 NOTE — PROGRESS NOTES
"Virtual Visit Details    Type of service:  Video Visit   Video Start Time: {video visit start/end time for provider to select:471254}  Video End Time:{video visit start/end time for provider to select:438527}    Originating Location (pt. Location): {video visit patient location:274344::\"Home\"}  {PROVIDER LOCATION On-site should be selected for visits conducted from your clinic location or adjoining [facility] hospital, academic office, or other nearby [facility] building. Off-site should be selected for all other provider locations, including home:511845}  Distant Location (provider location):  {virtual location provider:377234}  Platform used for Video Visit: {Virtual Visit Platforms:209811::\"[employer]\"}  "

## 2024-03-22 NOTE — NURSING NOTE
Is the patient currently in the state of [place]? YES    Visit mode:VIDEO    If the visit is dropped, the patient can be reconnected by: VIDEO VISIT: Text to cell phone:   Telephone Information:   Mobile [phone]       Will anyone else be joining the visit? NO  (If patient encounters technical issues they should call [phone]    How would you like to obtain your AVS? MyChart    Are changes needed to the allergy or medication list? Pt stated no changes to allergies and Pt stated no med changes    Reason for visit: KATIE ANDRADEF

## 2024-03-26 NOTE — TELEPHONE ENCOUNTER
Please let Mikala know that I will be placing a care coordination referral for her and to expect a phone call.  KAMAR Richard MD

## 2024-03-28 ENCOUNTER — PATIENT OUTREACH (OUTPATIENT)
Dept: CARE COORDINATION | Facility: CLINIC | Age: 84
End: 2024-03-28
Payer: MEDICARE

## 2024-03-28 NOTE — PROGRESS NOTES
Clinic Care Coordination Contact  [facility]/Voicemail    Clinical Data: Care Coordinator Outreach    Outreach Documentation Number of Outreach Attempt   3/28/2024  11:36 AM 1     Left message on patient's voicemail with call back information and requested return call.    Overview  RN or SW assessment - patent/caregiver support : patient has medical needs and also provides care for spouse, resources for support, patient struggling with multiple appointments   *if more SW schedule with Dianne, if more RN schedule with Whit Araya    Plan: Care Coordinator will try to reach patient again in 1-2 business days.    Megan Lai  Community Health Worker  [facility] Care Coordination   [place], [facility], [place], [place], [place] and [facility]  Office: [phone]

## 2024-04-01 NOTE — PROGRESS NOTES
Clinic Care Coordination Contact  Community Health Worker Initial Outreach    CHW Initial Information Gathering:  Referral Source: PCP  Current living arrangement:: I live in a private home with family  Type of residence:: Independent Senior Living  Community Resources: None  Informal Support system:: Family (Cousin)  No PCP office visit in Past Year: No (10/13/23 with Dr. Richard)  CHW Additional Questions  [employer] active?: Yes  Patient sent Social Determinants of Health questionnaire?: Yes    Patient accepts CC: Yes. Patient scheduled for assessment with NEYMAR Dean on 4/4/23 at 9:00. Patient noted desire to discuss:    - Support for preparing for upcoming surgery. (Surgery not scheduled yet. Patient waiting for teat results to come back regarding lung nodule.)    - Resources for transportation for surgery-to and from surgery.     - Caregiver support resources.    ** For  Assessment call patient on her home number @ [phone]    ** CHW sent Care Coordination Questionnaires through [employer].    Megan Lai  [facility] Health Worker  [facility] Care Coordination   [facility], [facility]  Office: [phone]

## 2024-04-04 ENCOUNTER — TELEPHONE (OUTPATIENT)
Dept: ENDOCRINOLOGY | Facility: CLINIC | Age: 84
End: 2024-04-04

## 2024-04-04 ENCOUNTER — PATIENT OUTREACH (OUTPATIENT)
Dept: CARE COORDINATION | Facility: CLINIC | Age: 84
End: 2024-04-04

## 2024-04-04 NOTE — TELEPHONE ENCOUNTER
[facility] Call Center    Phone Message    May a detailed message be left on voicemail: yes     Reason for Call: Other:  Sathish from [employer] called today and stated they noticed patient had thyroid testing done with [employer] as they were also sent and order to do testing at [facility] and sathish is wondering if there was changes otherwise testing can not be completed by another company again.     Action Taken: Message routed to:  [facility] endo    Travel Screening: Not Applicable

## 2024-04-04 NOTE — LETTER
[facility] CARE COORDINATION  [address] [facility] [address]   April 4, 2024    Jody Ch  [address]  [facility] [address]  [place] [address]      Dear Jody,    I am a clinic care coordinator who works with Talisha Richard MD with the [facility]. I recently tried to call and was unable to reach you. Below is a description of clinic care coordination and how I can further assist you.       The clinic care coordination team is made up of a registered nurse, , financial resource worker and community health worker who understand the health care system. The goal of clinic care coordination is to help you manage your health and improve access to the health care system. Our team works alongside your provider to assist you in determining your health and social needs. We can help you obtain health care and community resources, providing you with necessary information and education. We can work with you through any barriers and develop a care plan that helps coordinate and strengthen the communication between you and your care team.  Our services are voluntary and are offered without charge to you personally.    Please feel free to contact me with any questions or concerns regarding care coordination and what we can offer.      We are focused on providing you with the highest-quality healthcare experience possible.    Sincerely,     Dianne Gonsalez, [facility] Clinical Care Coordinator  [facility], [facility], and [facility]   [phone]

## 2024-04-04 NOTE — PROGRESS NOTES
Clinic Care Coordination Contact  [facility]/Voicemail    Clinical Data: Care Coordinator Outreach    Outreach Documentation Number of Outreach Attempt   3/28/2024  11:36 AM 1   4/4/2024   9:55 AM 2   4/5/2024   1:34 PM 3   4/8/2024   9:46 AM 4       Left message on patient's voicemail with call back information and requested return call.    Plan: Care Coordinator sent care coordination introduction letter on 4/4/24 via [employer]. Care Coordinator will do no further outreach at this time. Pt has this CC's contact information via phone and My Chart. If pt reaches out, [facility] will provide support at that time.       
Normal for race

## 2024-04-05 DIAGNOSIS — E04.1 THYROID NODULE: Primary | ICD-10-CM

## 2024-04-05 NOTE — TELEPHONE ENCOUNTER
Lm for [employer] to c/b to discuss below.    Contact main number - Spoke to [employer] to discuss- LCD from cms- only allowed to perform molecular testing once per lifetime. They can't run unless there has been a change in the nodule.   FNA either have to be sent to [facility] or [facility] can not go to both.     Shannon stated if the provider would like to discuss further can contact them- or if there has been and update please let them know. They will keep the case open for now.

## 2024-04-05 NOTE — TELEPHONE ENCOUNTER
Spoke to and informed that we cannot perform the test base on insurance limitation and Per provider I recommend repeat FNA.     Pt verbalized understanding- will call to schedule.

## 2024-04-10 ENCOUNTER — PATIENT OUTREACH (OUTPATIENT)
Dept: CARE COORDINATION | Facility: CLINIC | Age: 84
End: 2024-04-10
Payer: MEDICARE

## 2024-04-10 NOTE — PROGRESS NOTES
Clinic Care Coordination Contact  Community Health Worker Initial Outreach    CHW Initial Information Gathering:  Referral Source: PCP  Preferred Hospital: [facility]  [phone]  Preferred Urgent Care: [facility], [phone]  Current living arrangement:: Other  Type of residence:: Independent Senior Living  Community Resources: None  Supplies Currently Used at Home: None  Equipment Currently Used at Home: grab bar, toilet, grab bar, tub/shower, shower chair, walker, rolling, wheelchair, manual  Informal Support system:: Other  Transportation means:: Medical transport, Regular car  CHW Additional Questions  MyChart active?: Yes  Patient sent Social Determinants of Health questionnaire?: Yes    Patient accepts CC: Yes. Patient scheduled for assessment with NEYMAR Dean on 4/12/24 at 9:00. Patient noted desire to discuss:    - Support for preparing for upcoming surgery. (Surgery not scheduled yet. Patient waiting for teat results to come back regarding lung nodule.)     - Resources for transportation for surgery-to and from surgery.      - Caregiver support resources.     ** For SW Assessment call patient on her home number @ [phone]      ** CHW sent Care Coordination Questionnaires through [employer].     Megan Lai  [facility] Health Worker  [facility] Care Coordination   Kami Perea, [place], [place], [facility]  Office: [phone]

## 2024-04-12 ENCOUNTER — PATIENT OUTREACH (OUTPATIENT)
Dept: NURSING | Facility: CLINIC | Age: 84
End: 2024-04-12
Payer: MEDICARE

## 2024-04-12 ASSESSMENT — ACTIVITIES OF DAILY LIVING (ADL): DEPENDENT_IADLS:: INDEPENDENT

## 2024-04-12 NOTE — LETTER
"      [facility] CARE COORDINATION  [address]  [facility] [address]   April 12, 2024    Jody Ch  [address]    [place] [address]      Dear Jody,    I am a clinic care coordinator who works with Talisha Richard MD with the [facility]. I wanted to thank you for spending the time to talk with me.  Below is a description of the resources we talked about together.     Help at your door: With a team of personal drivers, we can get you where you want to go. We can take you to appointments and anywhere else within the [facility] we serve. Schedule Help At Your Door's Transportation Service today by calling [phone].  [email or web address]    Go Go Grandparent: We arrange reliable rides for seniors, 24/7. Speak to an  or use our easy menu. Pickups can happen within 15 minutes, or you can schedule rides for seniors in advance. Call +[phone] and wait to hear: \"Thanks for calling [employer].\"  [email or web address]    [facility] Volunteer Drivers - I could not find any information about [facility] having Volunteer drivers.     [employer] [place] - you can reach out to you BCBS to see if they offer medical rides as well.     Please feel free to contact me with any questions or concerns regarding care coordination and what we can offer.      We are focused on providing you with the highest-quality healthcare experience possible.    Sincerely,     Dianne Gonsalez, [facility] Clinical Care Coordinator  [facility], [facility], and [facility]   [phone]     Enclosed: I have enclosed a copy of the Patient Centered Plan of Care. This has helpful information and goals that we have talked about. Please keep this in an easy to access place to use as needed.       "

## 2024-04-12 NOTE — PROGRESS NOTES
Clinic Care Coordination Contact  Clinic Care Coordination Contact  OUTREACH    Referral Information:  Referral Source: PCP    Primary Diagnosis: Psychosocial    Chief Complaint   Patient presents with    Clinic Care Coordination - Initial        Universal Utilization:   Clinic Utilization  Difficulty keeping appointments:: No  Compliance Concerns: No  No-Show Concerns: No  No PCP office visit in Past Year: No  Utilization      No Show Count (past year)  1             ED Visits  0             Hospital Admissions  0                    Current as of: 4/10/2024 10:19 AM                Clinical Concerns:  Current Medical Concerns:    Patient Active Problem List   Diagnosis    Malignant neoplasm of upper-inner quadrant of right breast in female, 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)    ACP (advance care planning)    Atrophic vaginitis    Bilateral leg paresthesia    Breast cancer, stage 2, right (H)    Chronic cough    Chronic diarrhea    Chronic rhinitis    Dry mouth    Gallbladder polyp    History of left breast cancer    Hypercholesteremia    IC (interstitial cystitis)    Ileitis    Impaired fasting glucose    Osteopenia    Peripheral axonal neuropathy    Pharyngeal dysphagia    SOB (shortness of breath)    Tremor, essential    Vitamin D insufficiency    Vitreous degeneration    Post-mastectomy lymphedema syndrome    Contracture of right shoulder    S/P bilateral cataract extraction    Recurrent aspiration pneumonia (H)    Other pulmonary embolism without acute cor pulmonale (H)    Oral phase dysphagia    ADAN (generalized anxiety disorder)    Diverticulitis of intestine, part unspecified, without perforation or abscess without bleeding    GERD (gastroesophageal reflux disease)    Osteoporosis, senile    Vitamin B12 deficiency    Acute midline low back pain without sciatica    Atherosclerotic vascular disease    Colloid thyroid nodule    Estrogen receptor negative status (ER-)    Recurrent UTI    Gastroesophageal reflux  "disease    History of malignant neoplasm of breast    Major depression, recurrent, full remission (H24)    Mixed stress and urge urinary incontinence    Pulmonary emphysema (H)    Former smoker        Current Behavioral Concerns: no current concerns      Education Provided to patient:     Help at your door: With a team of personal drivers, we can get you where you want to go. We can take you to appointments and anywhere else within the [facility] we serve. Schedule Help At Your Door's Transportation Service today by calling [phone].  [email or web address]    Go Go Grandparent: We arrange reliable rides for seniors, 24/7. Speak to an  or use our easy menu. Pickups can happen within 15 minutes, or you can schedule rides for seniors in advance. Call +[phone] and wait to hear: \"Thanks for calling [employer].\"  [email or web address]    [facility] Volunteer Drivers - I could not find any information about [facility] having Volunteer drivers.     [place] - you can reach out to you BCBS to see if they offer medical rides as well.     Respite - you can check with your building to see if they can check on Los a few times while you have surgery. If need be, outside services can be brought in. The providers listed below all offer respite services as needed (they are private pay). Other options would be seeing if the [facility] would cover a few hours of home care for Los.     [facility]  [phone]  [email or web address]    [facility] [place]  [phone]  [email or web address]    [facility]  [phone]  [email or web address]    [facility]  [phone]  [email or web address]      Pain  Pain (GOAL):: No  Health Maintenance Reviewed: Due/Overdue   Health Maintenance Due   Topic Date Due    RSV VACCINE (Pregnancy & 60+) (1 - 1-dose 60+ series) Never done    MAMMO SCREENING  05/11/2018    PHQ-9  04/13/2024      Clinical Pathway: None    Medication " Management:  Medication review status: Medications reviewed and no changes reported per patient.             Functional Status:  Dependent ADLs:: Independent  Dependent IADLs:: Independent  Bed or wheelchair confined:: No  Mobility Status: Independent  Fallen 2 or more times in the past year?: No  Any fall with injury in the past year?: No    Living Situation:  Current living arrangement:: Other  Type of residence:: Independent Senior Living    Lifestyle & Psychosocial Needs:    Social Determinants of Health     Food Insecurity: Low Risk  (4/12/2024)    Food Insecurity     Within the past 12 months, did you worry that your food would run out before you got money to buy more?: No     Within the past 12 months, did the food you bought just not last and you didn t have money to get more?: No   Depression: Not at risk (3/22/2024)    PHQ-2     PHQ-2 Score: 0   Housing Stability: Low Risk  (4/12/2024)    Housing Stability     Do you have housing? : Yes     Are you worried about losing your housing?: No   Tobacco Use: Medium Risk (3/22/2024)    Patient History     Smoking Tobacco Use: Former     Smokeless Tobacco Use: Never     Passive Exposure: Not on file   Financial Resource Strain: Low Risk  (4/12/2024)    Financial Resource Strain     Within the past 12 months, have you or your family members you live with been unable to get utilities (heat, electricity) when it was really needed?: No   Alcohol Use: Not At Risk (4/1/2024)    AUDIT-C     Frequency of Alcohol Consumption: 2-4 times a month     Average Number of Drinks: 1 or 2     Frequency of Binge Drinking: Never   Transportation Needs: Low Risk  (4/12/2024)    Transportation Needs     Within the past 12 months, has lack of transportation kept you from medical appointments, getting your medicines, non-medical meetings or appointments, work, or from getting things that you need?: No   Physical Activity: Insufficiently Active (4/1/2024)    Exercise Vital Sign     Days of  Exercise per Week: 3 days     Minutes of Exercise per Session: 20 min   Interpersonal Safety: Low Risk  (10/13/2023)    Interpersonal Safety     Do you feel physically and emotionally safe where you currently live?: Yes     Within the past 12 months, have you been hit, slapped, kicked or otherwise physically hurt by someone?: No     Within the past 12 months, have you been humiliated or emotionally abused in other ways by your partner or ex-partner?: No   Stress: Stress Concern Present (4/1/2024)    [place] [employer] - Occupational Stress Questionnaire     Feeling of Stress : To some extent   Social Connections: Socially Isolated (4/1/2024)    Social Connection and Isolation Panel [NHANES]     Frequency of Communication with Friends and Family: Once a week     Frequency of Social Gatherings with Friends and Family: Never     Attends Anabaptism Services: Never     Active Member of Clubs or Organizations: No     Attends Club or Organization Meetings: Never     Marital Status:    Health Literacy: Not on file     Diet:: Regular  Inadequate nutrition (GOAL):: No  Tube Feeding: No  Inadequate activity/exercise (GOAL):: No  Significant changes in sleep pattern (GOAL): No  Transportation means:: Medical transport, Regular car     Anabaptism or spiritual beliefs that impact treatment:: No  Mental health DX:: No  Mental health management concern (GOAL):: No  Chemical Dependency Status: No Current Concerns  Informal Support system:: Other             Resources and Interventions:  Current Resources:      Community Resources: None  Supplies Currently Used at Home: None  Equipment Currently Used at Home: grab bar, toilet, grab bar, tub/shower, shower chair, walker, rolling, wheelchair, manual            Advance Care Plan/Directive  Advanced Care Plans/Directives on file:: No    Referrals Placed: Transportation         Care Plan:  Care Plan: Transportation       Problem: Lack of transportation        Goal: Establish reliable transportation       Start Date: 4/12/2024    Note:     Goal Statement: I take steps to secure reliable transportation for my upcoming surgery over the next month.  Barriers: limited time  Strengths: motivated  Patient expressed understanding of goal: yes    Action steps to achieve this goal:  I will review resources and supports sent to me via Mail and my chart: go go grandparent, help at your door, insurance, and my building.   I will outreach to supports and consider establishing with ones I might find beneficial.  I will continue to outreach to care coordination as needed for additional resources or supports.                                Patient/Caregiver understanding: Pt reports understanding and denies any additional questions or concerns at this times. SW CC engaged in AIDET communication during encounter.     Outreach Frequency: monthly, more frequently as needed  Future Appointments                In 1 week [facility] RADIOLOGIST 2; [facility] US 4 M [facility] Ultrasound, [facility]    In 2 months Jamison Wallace MD [facility], [facility]    In 6 months Ally Jeffrey PA-C [facility] ALIVIA    In 6 months [facility] PFT RM 1 M [facility] Beam, Beam    In 6 months Priya Rivera MD [facility] Beam, Beam            Plan: [facility] completed enrollment to [facility]. [facility] completed handoff to [facility]. [facility] provided resources via phone, mail, and my chart. [facility] will complete outreach in about 4 week. [facility] will complete chart review in about 6 weeks. [facility] reviewed care coordination role and availability with pt. CC discussed resources and supports available. Resources discussed: respite and transportation. Pt has surgery at an upcoming date (not set) and will need to go into the city. Pt will need transportation for this. Options were discussed. Pt will look into options and make a  plan for the best option - pt will most likely get a ride home with her brother. Pt noted she will need to organize respite care for her  - she is seeing if her building can do this but options for outside providers were also reviewed. Pt and SWCC talked over situation; pt will start working on what is needed and reach out if anything is needed.

## 2024-04-12 NOTE — LETTER
"      [facility] CARE COORDINATION  [address]  [place] [address]   April 12, 2024    Jody Ch  [address]    [place] [address]      Dear Jody,    I am a clinic care coordinator who works with Talisha Richard MD with the [facility]. I wanted to thank you for spending the time to talk with me.  Below is a description of the resources we talked about together.     Help at your door: With a team of personal drivers, we can get you where you want to go. We can take you to appointments and anywhere else within the [facility] we serve. Schedule Help At Your Door's Transportation Service today by calling [phone].  [email or web address]    Go Go Grandparent: We arrange reliable rides for seniors, 24/7. Speak to an  or use our easy menu. Pickups can happen within 15 minutes, or you can schedule rides for seniors in advance. Call +5 [phone] and wait to hear: \"Thanks for calling [employer].\"  [email or web address]    [employer] Volunteer Drivers - I could not find any information about [employer] having Volunteer drivers.     [place] [place] - you can reach out to you BCBS to see if they offer medical rides as well.     Respite - you can check with your building to see if they can check on Los a few times while you have surgery. If need be, outside services can be brought in. The providers listed below all offer respite services as needed (they are private pay). Other options would be seeing if the [facility] would cover a few hours of home care for Los.     [facility]  [phone]  [email or web address]    [facility] [place]  [phone]  [email or web address]    [facility]  [phone]  [email or web address]    [facility]  [phone]  [email or web address]     Please feel free to contact me with any questions or concerns regarding care coordination and what we can offer.      We are focused on providing you with " the highest-quality healthcare experience possible.    Sincerely,     Dianne Gonsalez [facility] Clinical Care Coordinator  [facility], and [facility]   [phone]

## 2024-04-12 NOTE — LETTER
[facility]  Patient Centered Plan of Care  About Me:        Patient Name:  Jody Pendleton    YOB: 1940  Age:         83 year old   Liz MRN:    [id number] Telephone Information:  Home Phone [phone]   Mobile [phone]       Address:  [address] [place] [address] [address]  [address]  [facility] [address] Email address:  [email or web address]      Emergency Contact(s)    Name Relationship Lgl Grd Work Phone Home Phone Mobile Phone   1. PRADIP PENDLETON Spouse   [phone]   2. EDNA MCNEILL Cousin   [phone]   3. NEMESIO SHARP Brother    [phone]           Primary language:  English     needed? No   [place] [employer]:  [phone] op. 1  Other communication barriers:None    Preferred Method of Communication:     Current living arrangement: Other    Mobility Status/ Medical Equipment: Independent        Health Maintenance  Health Maintenance Reviewed: Due/Overdue   Health Maintenance Due   Topic Date Due    RSV VACCINE (Pregnancy & 60+) (1 - 1-dose 60+ series) Never done    MAMMO SCREENING  05/11/2018    PHQ-9  04/13/2024          My Access Plan  Medical Emergency 911   Primary Clinic Line [facility] [phone]   24 Hour Appointment Line [phone] or  [phone]) (toll-free)   24 Hour Nurse Line [phone] (toll-free)   Preferred Urgent Care [facility] [phone]     [facility]  [phone]     Preferred Pharmacy [facility] [address] [facility]     Behavioral Health Crisis Line The National Suicide Prevention Lifeline at 1-919.977.6985 or Text/Call 578           My Care Team Members  Patient Care Team         Relationship Specialty Notifications Start End    Talisha Richard MD PCP - General Family Medicine  1/10/24     Phone: [phone] Fax: [phone] [address] [facility] [address]     Janine Barrera MD MD Hematology & Oncology  8/24/18     Phone: [phone] Fax: [phone] [address] [facility] [address]    Geo Fong MD MD Ophthalmology  5/19/23     Phone: [phone] Fax: [phone]         [address] [facility] [address]    Geo Fong MD Assigned Surgical Provider   8/19/23     Phone: [phone] Fax: [phone]         [address] [place] [address]    Talisha Richard MD Assigned PCP   9/21/23     Phone: [phone] Fax: [phone] [address] [facility] [address]    Jamison Wallace MD Assigned Endocrinology Provider   1/25/24     Phone: [phone] Fax: [phone]          [facility] [address]    Dianne Gonsalez LICSW Lead Care Coordinator Primary Care - CC Admissions 4/10/24     Phone: [phone]         Megan Lai HERMES Community Health Worker  Admissions 4/12/24     Phone: [phone]                     My Care Plans  Self Management and Treatment Plan    Care Plan  Care Plan: Transportation       Problem: Lack of transportation       Goal: Establish reliable transportation       Start Date: 4/12/2024    Note:     Goal Statement: I take steps to secure reliable transportation for my upcoming surgery over the next month.  Barriers: limited time  Strengths: motivated  Patient expressed understanding of goal: yes    Action steps to achieve this goal:  I will review resources and supports sent to me via Mail and my chart: go go grandparent, help at your door, insurance, and my building.   I will outreach to supports and consider establishing with ones I might find beneficial.  I will continue to outreach to care coordination as needed for additional resources or supports.                                           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)    ACP (advance care planning)    Atrophic vaginitis    Bilateral leg paresthesia    Breast cancer, stage 2, right (H)    Chronic cough    Chronic diarrhea    Chronic rhinitis    Dry mouth    Gallbladder polyp    History of left breast cancer    Hypercholesteremia    IC (interstitial cystitis)    Ileitis    Impaired fasting glucose    Osteopenia    Peripheral axonal neuropathy    Pharyngeal dysphagia    SOB (shortness of breath)    Tremor, essential    Vitamin D insufficiency    Vitreous degeneration    Post-mastectomy lymphedema syndrome    Contracture of right shoulder    S/P bilateral cataract extraction    Recurrent aspiration pneumonia (H)    Other pulmonary embolism without acute cor pulmonale (H)    Oral phase dysphagia    ADAN (generalized anxiety disorder)    Diverticulitis of intestine, part unspecified, without perforation or abscess without bleeding    GERD (gastroesophageal reflux disease)    Osteoporosis, senile    Vitamin B12 deficiency    Acute midline low back pain without sciatica    Atherosclerotic vascular disease    Colloid thyroid nodule    Estrogen receptor negative status (ER-)    Recurrent UTI    Gastroesophageal reflux disease    History of malignant neoplasm of breast    Major depression, recurrent, full remission (H24)    Mixed stress and urge urinary incontinence    Pulmonary emphysema (H)    Former smoker      Current Medications and Allergies:   Current Outpatient Medications   Medication Sig Dispense Refill    albuterol (ACCUNEB) 1.25 MG/3ML neb solution Take 1.25 mg by nebulization 2 times daily as needed for shortness of breath, wheezing or cough      alendronate (FOSAMAX) 70 MG tablet Take 1 tablet (70 mg) by mouth every 7 days 12 tablet 3    calcium 600 MG tablet Take 1 tablet (600 mg) by mouth 2 times daily 180 tablet 3    Cholecalciferol (VITAMIN D3 PO) Take 2,000 Units by mouth daily      Cyanocobalamin (B-12) 1000 MCG TBCR Take 1,000 mcg by mouth daily       escitalopram (LEXAPRO) 10 MG tablet Take 1 tablet (10 mg) by mouth daily 90 tablet 3    Fluticasone-Umeclidin-Vilant (TRELEGY ELLIPTA) 100-62.5-25 MCG/ACT oral inhaler Inhale 1 puff into the lungs daily      omeprazole (PRILOSEC) 20 MG DR capsule Take 1 capsule (20 mg) by mouth daily 90 capsule 3     No current facility-administered medications for this visit.       Allergies   Allergen Reactions    Cephalexin Shortness Of Breath     Bladder burning    Amitriptyline Unknown    Gabapentin Swelling     Shortness of breath    Latex Swelling and Other (See Comments)    Sulfa Antibiotics Unknown    Tetracyclines & Related Unknown        Care Coordination Start Date: 4/10/2024   Frequency of Care Coordination: monthly, more frequently as needed     Form Last Updated: 04/12/2024

## 2024-04-15 NOTE — TELEPHONE ENCOUNTER
Spoke to Sathish - Sathish was calling again in regards to testing. Informed Sathish was already discussed with someone else at [facility].

## 2024-04-24 ENCOUNTER — HOSPITAL ENCOUNTER (OUTPATIENT)
Dept: ULTRASOUND IMAGING | Facility: HOSPITAL | Age: 84
Discharge: HOME | End: 2024-04-24
Attending: INTERNAL MEDICINE | Admitting: INTERNAL MEDICINE
Payer: MEDICARE

## 2024-04-24 DIAGNOSIS — E04.1 THYROID NODULE: ICD-10-CM

## 2024-04-24 PROCEDURE — 88173 CYTOPATH EVAL FNA REPORT: CPT | Mod: TC | Performed by: INTERNAL MEDICINE

## 2024-04-24 PROCEDURE — 10005 FNA BX W/US GDN 1ST LES: CPT

## 2024-04-25 LAB
PATH REPORT.COMMENTS IMP SPEC: ABNORMAL
PATH REPORT.COMMENTS IMP SPEC: ABNORMAL
PATH REPORT.COMMENTS IMP SPEC: YES
PATH REPORT.FINAL DX SPEC: ABNORMAL
PATH REPORT.GROSS SPEC: ABNORMAL
PATH REPORT.MICROSCOPIC SPEC OTHER STN: ABNORMAL
PATH REPORT.RELEVANT HX SPEC: ABNORMAL

## 2024-04-25 PROCEDURE — 88172 CYTP DX EVAL FNA 1ST EA SITE: CPT | Mod: 26 | Performed by: PATHOLOGY

## 2024-04-25 PROCEDURE — 88173 CYTOPATH EVAL FNA REPORT: CPT | Mod: 26 | Performed by: PATHOLOGY

## 2024-04-30 DIAGNOSIS — E04.1 THYROID NODULE: Primary | ICD-10-CM

## 2024-04-30 NOTE — PROGRESS NOTES
Thygenext not covered by insurance.  Talked to Sathish and then Rep Ellie Lawrence.  To get covered, need to show that there's change in nodule and send result in with letter of medical necessity.    Called and discussed with patient regarding options: ultrasound and if there's a higuera, will submit result with letter to Thygenext, vs diagnostic lobectomy. Patient would like to think about it and let me know.

## 2024-05-07 ENCOUNTER — TRANSFERRED RECORDS (OUTPATIENT)
Dept: HEALTH INFORMATION MANAGEMENT | Facility: CLINIC | Age: 84
End: 2024-05-07
Payer: MEDICARE

## 2024-05-08 ENCOUNTER — HOSPITAL ENCOUNTER (OUTPATIENT)
Dept: ULTRASOUND IMAGING | Facility: HOSPITAL | Age: 84
Discharge: HOME | End: 2024-05-08
Attending: INTERNAL MEDICINE | Admitting: INTERNAL MEDICINE
Payer: MEDICARE

## 2024-05-08 DIAGNOSIS — E04.1 THYROID NODULE: ICD-10-CM

## 2024-05-08 PROCEDURE — 76536 US EXAM OF HEAD AND NECK: CPT

## 2024-05-10 NOTE — RESULT ENCOUNTER NOTE
Hello -    I called and left voicemail message (3), hope I was able to discuss the result with you on the phone.     Here are my comments about the recent results: Compare to 10/2023, no significant change in thyroid nodule that was previously FNA.  Your biopsy result in 12/2023 showed AUS, and Afirma suspicious, risk of cancer is about 50%. In general, we recommend surgery.  However, given your situation and would like to avoid surgery if nodule is not cancer, we tried to send an alternative test of Afirma called Thygenext.  Both Afirma and Thegenext are good and both tests have similar performance to determine if nodule is benign or cancer, however, the test are difference.  We are hopeful to send your most recent FNA for Thygenext.   We found out that your insurance will only cover the test if there's significant change in the nodule.  We did an ultrasound and there's no significant change of your previously FNA nodule, so Thygenext will not be covered.    In general, we recommend surgery to remove half of the thyroid (the side that has a suspicious nodule).  Please let me know if you'd like me to call to discuss, otherwise, we can discuss more in details at follow up.    DR Valencia,   Jamison Wallace MD

## 2024-05-13 ENCOUNTER — PATIENT OUTREACH (OUTPATIENT)
Dept: CARE COORDINATION | Facility: CLINIC | Age: 84
End: 2024-05-13
Payer: MEDICARE

## 2024-05-13 NOTE — PROGRESS NOTES
Clinic Care Coordination Contact  Community Health Worker Follow Up    Care Gaps:     Health Maintenance Due   Topic Date Due    RSV VACCINE (Pregnancy & 60+) (1 - 1-dose 60+ series) Never done    MAMMO SCREENING  05/11/2018    COVID-19 Vaccine (7 - 2023-24 season) 02/24/2024    PHQ-9  04/13/2024       Scheduled 10/15/24 for AWV with Dr. Richard.      Care Plan:   Care Plan: Transportation       Problem: Lack of transportation       Goal: Establish reliable transportation       Start Date: 4/12/2024    This Visit's Progress: 20%    Note:     Goal Statement: I take steps to secure reliable transportation for my upcoming surgery over the next month.  Barriers: limited time  Strengths: motivated  Patient expressed understanding of goal: yes    Action steps to achieve this goal:  I will review resources and supports sent to me via Mail and my chart: go go grandparent, help at your door, insurance, and my building. I have called my [employer] and they do not have transportation available. I will look into the other resources soon.   I will outreach to supports and consider establishing with ones I might find beneficial.  I will continue to outreach to care coordination as needed for additional resources or supports.                                Intervention and Education during outreach: Patient has called [employer] and they do not have transportation available. Patient will look into the other resources soon. No other needs at this time    CHW Plan: CHW will follow up with patient in about 1 month.     Megan Lai  Community Health Worker  [facility]  Clinic Care Coordination   [facility], [place], [place], [facility]  Office: [phone]

## 2024-05-20 ENCOUNTER — OFFICE VISIT (OUTPATIENT)
Dept: FAMILY MEDICINE | Facility: CLINIC | Age: 84
End: 2024-05-20
Payer: MEDICARE

## 2024-05-20 ENCOUNTER — PATIENT OUTREACH (OUTPATIENT)
Dept: CARE COORDINATION | Facility: CLINIC | Age: 84
End: 2024-05-20

## 2024-05-20 ENCOUNTER — ANCILLARY PROCEDURE (OUTPATIENT)
Dept: GENERAL RADIOLOGY | Facility: CLINIC | Age: 84
End: 2024-05-20
Attending: PHYSICIAN ASSISTANT
Payer: MEDICARE

## 2024-05-20 VITALS
BODY MASS INDEX: 29.63 KG/M2 | HEART RATE: 78 BPM | OXYGEN SATURATION: 95 % | HEIGHT: 62 IN | TEMPERATURE: 98 F | DIASTOLIC BLOOD PRESSURE: 64 MMHG | WEIGHT: 161 LBS | SYSTOLIC BLOOD PRESSURE: 118 MMHG

## 2024-05-20 DIAGNOSIS — I26.99 OTHER ACUTE PULMONARY EMBOLISM WITHOUT ACUTE COR PULMONALE (H): ICD-10-CM

## 2024-05-20 DIAGNOSIS — Z12.31 VISIT FOR SCREENING MAMMOGRAM: ICD-10-CM

## 2024-05-20 DIAGNOSIS — C50.911 BREAST CANCER, STAGE 2, RIGHT (H): ICD-10-CM

## 2024-05-20 DIAGNOSIS — R05.1 ACUTE COUGH: ICD-10-CM

## 2024-05-20 DIAGNOSIS — J43.8 OTHER EMPHYSEMA (H): ICD-10-CM

## 2024-05-20 DIAGNOSIS — F33.42 MAJOR DEPRESSION, RECURRENT, FULL REMISSION (H): ICD-10-CM

## 2024-05-20 DIAGNOSIS — J01.90 ACUTE SINUSITIS WITH SYMPTOMS > 10 DAYS: Primary | ICD-10-CM

## 2024-05-20 DIAGNOSIS — B37.31 CANDIDIASIS OF VAGINA: ICD-10-CM

## 2024-05-20 PROCEDURE — 71046 X-RAY EXAM CHEST 2 VIEWS: CPT | Mod: TC | Performed by: RADIOLOGY

## 2024-05-20 PROCEDURE — G2211 COMPLEX E/M VISIT ADD ON: HCPCS | Performed by: PHYSICIAN ASSISTANT

## 2024-05-20 PROCEDURE — 99214 OFFICE O/P EST MOD 30 MIN: CPT | Performed by: PHYSICIAN ASSISTANT

## 2024-05-20 RX ORDER — FLUCONAZOLE 150 MG/1
150 TABLET ORAL ONCE
Qty: 1 TABLET | Refills: 0 | Status: SHIPPED | OUTPATIENT
Start: 2024-05-20 | End: 2024-05-20

## 2024-05-20 RX ORDER — DOXYCYCLINE 100 MG/1
100 CAPSULE ORAL 2 TIMES DAILY
Qty: 14 CAPSULE | Refills: 0 | Status: SHIPPED | OUTPATIENT
Start: 2024-05-20 | End: 2024-05-27

## 2024-05-20 NOTE — PROGRESS NOTES
Clinic Care Coordination Contact  Care Coordination Clinician Chart Review    Situation: Patient chart reviewed by Care Coordinator.       Background: Care Coordination Program started: 4/10/2024. Initial assessment completed and patient-centered care plan(s) were developed with participation from patient. Lead CC handed patient off to CHW for continued outreaches.       Assessment: Per chart review, patient outreach completed by CC CHW on 5/13/24.  Patient is actively working to accomplish goal(s). Patient's goal(s) appropriate and relevant at this time. Patient is not due for updated Plan of Care.  Assessments will be completed annually or as needed/with change of patient status.      Care Plan: Transportation       Problem: Lack of transportation       Goal: Establish reliable transportation       Start Date: 4/12/2024    This Visit's Progress: 20%    Note:     Goal Statement: I take steps to secure reliable transportation for my upcoming surgery over the next month.  Barriers: limited time  Strengths: motivated  Patient expressed understanding of goal: yes    Action steps to achieve this goal:  I will review resources and supports sent to me via Mail and my chart: go go grandparent, help at your door, insurance, and my building. I have called my [employer] and they do not have transportation available. I will look into the other resources soon.   I will outreach to supports and consider establishing with ones I might find beneficial.  I will continue to outreach to care coordination as needed for additional resources or supports.                                   Plan/Recommendations: The patient will continue working with Care Coordination to achieve goal(s) as above. CHW will continue outreaches at minimum every 30 days and will involve Lead CC as needed or if patient is ready to move to Maintenance. Lead CC will continue to monitor CHW outreaches and patient's progress to goal(s) every 6 weeks.     Plan of Care  updated and sent to patient: No

## 2024-05-20 NOTE — PROGRESS NOTES
"  Assessment & Plan     (J01.90) Acute sinusitis with symptoms > 10 days  (primary encounter diagnosis)  Comment: Symptom duration beyond 10 days with worsening cough. Xray without obvious acute pathology on independent review. Concern with blood in cough although after history seems likely more due to irritation given it only happened 2x and was minimal. That being said, would favor treating with abx given age, history, and duration/worsening of symptoms.   Reviewed her allergies - tetracyclines listed but when I asked patient she states that she has no specific allergy other than those antibiotics cause her to get a yeast infection  Plan: doxycycline hyclate (VIBRAMYCIN) 100 MG capsule            (I26.99) Other acute pulmonary embolism without acute cor pulmonale (H)  Comment:   Plan: not currently an issues. Resolved.     (J43.8) Other emphysema (H)  Comment: h/o - CXR given symptoms and duration  Plan: XR Chest 2 Views            (F33.42) Major depression, recurrent, full remission (H24)  Comment:   Plan: stable. No change in treatment plan today    (C50.911) Breast cancer, stage 2, right (H)  Comment:   Plan: followed by oncology    (R05.1) Acute cough  Comment: see comment above  Plan: XR Chest 2 Views, doxycycline hyclate         (VIBRAMYCIN) 100 MG capsule            (B37.31) Candidiasis of vagina  Comment: prone to yeast infections with antibiotics. Given dose of diflucan  Plan: fluconazole (DIFLUCAN) 150 MG tablet                      BMI  Estimated body mass index is 29.45 kg/m  as calculated from the following:    Height as of this encounter: 1.575 m (5' 2\").    Weight as of this encounter: 73 kg (161 lb).             Brittney Bach is a 83 year old, presenting for the following health issues:  Cough        5/20/2024     4:05 PM   Additional Questions   Roomed by RADHA Collazo     HPI       Acute Illness  Acute illness concerns: Cough with blood   Onset/Duration: 3 weeks   Symptoms:  Fever: " "No  Chills/Sweats: YES, both   Headache (location?): No  Sinus Pressure: No  Conjunctivitis:  No  Ear Pain: YES: right  Rhinorrhea: YES  Congestion: No  Sore Throat: No  Cough: YES-productive of yellow sputum, she did notice blood in the sputum this morning   Wheeze: No  Decreased Appetite: No  Nausea: No  Vomiting: YES- only after coughing so hard   Diarrhea: YES  Dysuria/Freq.: No  Dysuria or Hematuria: No  Fatigue/Achiness: YES- fatigue   Sick/Strep Exposure: No  Therapies tried and outcome: Robitussin,tylenol     -2 negative Covid tests.     Started with a runny nose in early May - actually had an appointment scheduled in clinic but then cancelled because she didn't feel there was anything anyone would be able to do  A few days later she was swallowing water and noted it went down the 'wrong pipe' - has had issues with swallowing more recently with her thyroid issues. Not sure if this is relevant but states everything seemed to spiral down from there  Since then has felt like she needed to cough to get the 'fluid' out of her chest but not able to get anything up  Sore throat in the beginning although that has resolved  Has had 2 episodes when coughing forcefully of a little blood - first time it was a streak or 'thread' in the sputum and the 2nd time was just a little spot. Has not happened since  Cough seems to be more severe in the morning when she first wakes up  Has been using her trillegy but has not had to use her rescue inhaler   Is feeling tired but mostly because she is not sleeping as well with the cough        Review of Systems  Remainder of ROS obtained and found to be negative other than that which was documented above        Objective    /64   Pulse 78   Temp 98  F (36.7  C) (Tympanic)   Ht 1.575 m (5' 2\")   Wt 73 kg (161 lb)   SpO2 95%   BMI 29.45 kg/m    Body mass index is 29.45 kg/m .  Physical Exam       Results for orders placed or performed in visit on 05/20/24   XR Chest 2 Views  "    Status: None    Narrative    EXAM: XR CHEST 2 VIEWS  LOCATION: [facility]  DATE: 5/20/2024    INDICATION: Cough >3 weeks. Aspirated on water prior to cough starting.  COMPARISON: None.      Impression    IMPRESSION: Implanted left chest port, catheter via IJ access, tip in the upper right atrium. Mild bilateral apical pleural thickening. Lungs are otherwise clear. No adenopathy or effusion. Normal cardiac size and pulmonary vascularity. Atherosclerotic   thoracic aorta. Degenerative changes both shoulders and the spine. Right axillary surgical clips. No prior study available for comparison. Current study will serve as a baseline for future follow-up.                        Signed Electronically by: Ally Jeffrey PA-C

## 2024-06-12 ENCOUNTER — PATIENT OUTREACH (OUTPATIENT)
Dept: CARE COORDINATION | Facility: CLINIC | Age: 84
End: 2024-06-12
Payer: MEDICARE

## 2024-06-12 NOTE — PROGRESS NOTES
Clinic Care Coordination Contact  Community Health Worker Follow Up    Care Gaps:     Health Maintenance Due   Topic Date Due    RSV VACCINE (Pregnancy & 60+) (1 - 1-dose 60+ series) Never done    MAMMO SCREENING  05/11/2018    COVID-19 Vaccine (7 - 2023-24 season) 02/24/2024    PHQ-9  04/13/2024       Patient accepted scheduling phone number for  [facility]  to schedule independently     Care Plan:   Care Plan: Transportation       Problem: Lack of transportation       Goal: Establish reliable transportation       Start Date: 4/12/2024    This Visit's Progress: 30% Recent Progress: 20%    Note:     Goal Statement: I take steps to secure reliable transportation for my upcoming surgery over the next month.  Barriers: limited time  Strengths: motivated  Patient expressed understanding of goal: yes    Action steps to achieve this goal:  I will review resources and supports sent to me via Mail and my chart: go go grandparent, help at your door, insurance, and my building. I have called my [employer] and they do not have transportation available. I have looked into Go Go Grandparent and have tried calling [employer]. My CHW is sending me a few other options through [employer] I can look into.  I will outreach to supports and consider establishing with ones I might find beneficial.  I will continue to outreach to care coordination as needed for additional resources or supports.                                Intervention and Education during outreach: Patient called Go Go Grandparent and they are to expensive. Patient has called Help at your door and they have not called patient back. Patient is checking into building van services.    CHW sent patient other transportation resources through [employer]:    Medical Transportation Management (MTM)   [phone] Call Center    [phone]   Door thru door      [employer]    [phone]   Sliding Scale      Transit Link   [phone]   May only be used  for routes outside of the normal metro transit lines   Curb to curb transportation   May reserve a ride up to five days in advance   Any type of ride   The base fare is $3.50, one way.     CHW Plan: CHW will follow up with patient again in about 1 month.     Megan Lai  Community Health Worker  [facility] Care Coordination   [place], [place], [place], [facility]  Office: [phone]

## 2024-06-26 ENCOUNTER — PATIENT OUTREACH (OUTPATIENT)
Dept: CARE COORDINATION | Facility: CLINIC | Age: 84
End: 2024-06-26
Payer: MEDICARE

## 2024-06-26 NOTE — LETTER
[facility] COORDINATION  [address]  [place] [address]    July 9, 2024        Jody Pendleton  [address]  [address]  [place] [address]          Dear Cecelia Vera is an updated Patient Centered Plan of Care for your continued enrollment in Care Coordination. Please let us know if you have additional questions, concerns, or goals that we can assist with.    Sincerely,    Khadra Lincoln RN  Clinic Care Coordination  [phone]            [facility]  Patient Centered Plan of Care  About Me:        Patient Name:  Jody Pendleton    YOB: 1940  Age:         83 year old   [place] MRN:    [id number] Telephone Information:  Home Phone [phone]   Mobile [phone]       Address:  [address]  [place] [address] Email address:  [email or web address]      Emergency Contact(s)    Name Relationship Lgl Grd Work Phone Home Phone Mobile Phone   1. EDNA JOLLY Cousin   [phone]   2. PRADIP PENDLETON Spouse   [phone]   3. NEMESIO SHARP Brother    [phone]           Primary language:  English     needed? No   [place] [employer]:  [phone] op. 1  Other communication barriers:None    Preferred Method of Communication:     Current living arrangement: Other    Mobility Status/ Medical Equipment: Independent        Health Maintenance  Health Maintenance Reviewed: Due/Overdue       My Access Plan  Medical Emergency 911   Primary Clinic Line [facility] [phone]   24 Hour Appointment Line [phone] or  [phone]) (toll-free)   24 Hour Nurse Line [phone] (toll-free)   Preferred Urgent Care [facility] [phone]     Preferred Hospital [facility]  [phone]     Preferred Pharmacy [facility] [address]     Behavioral Health Crisis Line The National Suicide  Prevention Lifeline at 1-369.725.9123 or Text/Call 988           My Care Team Members  Patient Care Team         Relationship Specialty Notifications Start End    Talisha Richard MD PCP - General Family Medicine  1/10/24     Phone: [phone] Fax: [phone] [address] [facility] [address]    Janine Barrera MD MD Hematology & Oncology  8/24/18     Phone: [phone] Fax: [phone]         [address] [facility] [address]    Geo Fong MD MD Ophthalmology  5/19/23     Phone: [phone] Fax: [phone]         [address] [place] [address]    Geo Fong MD Assigned Surgical Provider   8/19/23     Phone: [phone] Fax: [phone]         [address] [place] [address]    Talisha Richard MD Assigned PCP   9/21/23     Phone: [phone] Fax: [phone] [address] [facility] [address]    Jamison Wallace MD Assigned Endocrinology Provider   1/25/24     Phone: [phone] Fax: [phone]         [address] [place] [address]    Megan Lai HERMES Community Health Worker  Admissions 4/12/24     Phone: [phone]         Khadra Lincoln RN Lead Care Coordinator Primary Care -  Admissions 6/26/24     Phone: [phone]         Eryn Jose MD MD Surgery  7/8/24     Phone: [phone] Fax: [phone]         [address] [place] [address]                My Care Plans  Self Management and Treatment Plan    Care Plan  Care Plan: Transportation       Problem: Lack of transportation       Goal: Establish reliable transportation       Start Date: 4/12/2024    This Visit's Progress: 30% Recent Progress: 20%    Note:     Goal Statement: I take steps to secure reliable transportation for my upcoming surgery over the next month.  Barriers: limited time  Strengths: motivated  Patient expressed understanding of goal: yes    Action steps to achieve this goal:  I will review resources and supports sent  to me via Mail and my chart: go go grandparent, help at your door, insurance, and my building. I have called my BCBS and they do not have transportation available. I have looked into Go Go Grandparent and have tried calling [employer]. My CHW is sending me a few other options through [employer] I can look into.  I will outreach to supports and consider establishing with ones I might find beneficial.  I will continue to outreach to care coordination as needed for additional resources or supports.                                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)    ACP (advance care planning)    Atrophic vaginitis    Bilateral leg paresthesia    Breast cancer, stage 2, right (H)    Chronic cough    Chronic diarrhea    Chronic rhinitis    Dry mouth    Gallbladder polyp    History of left breast cancer    Hypercholesteremia    IC (interstitial cystitis)    Ileitis    Impaired fasting glucose    Osteopenia    Peripheral axonal neuropathy    Pharyngeal dysphagia    SOB (shortness of breath)    Tremor, essential    Vitamin D insufficiency    Vitreous degeneration    Post-mastectomy lymphedema syndrome    Contracture of right shoulder    S/P bilateral cataract extraction    Recurrent aspiration pneumonia (H)    Other pulmonary embolism without acute cor pulmonale (H)    Oral phase dysphagia    ADAN (generalized anxiety disorder)    Diverticulitis of intestine, part unspecified, without perforation or abscess without bleeding    GERD (gastroesophageal reflux disease)    Osteoporosis, senile    Vitamin B12 deficiency    Acute midline low back pain without sciatica    Atherosclerotic  vascular disease    Colloid thyroid nodule    Estrogen receptor negative status (ER-)    Recurrent UTI    Gastroesophageal reflux disease    History of malignant neoplasm of breast    Major depression, recurrent, full remission (H24)    Mixed stress and urge urinary incontinence    Pulmonary emphysema (H)    Former smoker      Current Medications and Allergies:  See printed Medication Report.    Care Coordination Start Date: 4/10/2024   Frequency of Care Coordination: monthly, more frequently as needed     Form Last Updated: 07/09/2024

## 2024-06-26 NOTE — LETTER
[facility]  [address]  [facility] [address]   June 26, 2024        Jody Pendleton  [address]  [address]  [place] [address]          Dear Cecelia Vera is an updated Patient Centered Plan of Care for your continued enrollment in Care Coordination. Please let us know if you have additional questions, concerns, or goals that we can assist with.    Sincerely,    Dianne Gonsalez [facility] Clinical Care Coordinator  [facility], [facility], and [facility]  Patient Centered Plan of Care  About Me:        Patient Name:  Jody Pendleton    YOB: 1940  Age:         83 year old   [place] MRN:    [id number] Telephone Information:  Home Phone [phone]   Mobile [phone]       Address:  [address]  [place] [address] Email address:  [email or web address]      Emergency Contact(s)    Name Relationship Lgl Grd Work Phone Home Phone Mobile Phone   1. EDNA JOLLY Cousin   [phone]   2. PRADIP PENDLETON Spouse   [phone]   3. NEMESIO SHARP Brother    [phone]           Primary language:  English     needed? No   [place] [employer]:  [phone] op. 1  Other communication barriers:None    Preferred Method of Communication:     Current living arrangement: Other    Mobility Status/ Medical Equipment: Independent        Health Maintenance  Health Maintenance Reviewed: Due/Overdue   Health Maintenance Due   Topic Date Due    RSV VACCINE (Pregnancy & 60+) (1 - 1-dose 60+ series) Never done    MAMMO SCREENING  05/11/2018    COVID-19 Vaccine (7 - 2023-24 season) 02/24/2024    PHQ-9  04/13/2024          My Access Plan  Medical Emergency 911   Primary Clinic Line [facility] [phone]   24 Hour Appointment Line [phone] or  [phone]) (toll-free)   24 Hour Nurse Line [phone] (toll-free)    Preferred Urgent Care [facility], [phone]     Preferred Hospital [facility]  [phone]     Preferred Pharmacy [facility] [address] [facility]     Behavioral Health Crisis Line The National Suicide Prevention Lifeline at 1-471.342.5829 or Text/Call 988           My Care Team Members  Patient Care Team         Relationship Specialty Notifications Start End    Talisha Richard MD PCP - General Family Medicine  1/10/24     Phone: [phone] Fax: [phone] [address] [place] [address]    Janine Barrera MD MD Hematology & Oncology  8/24/18     Phone: [phone] Fax: [phone]         [address] [facility] [address]    Geo Fong MD MD Ophthalmology  5/19/23     Phone: [phone] Fax: [phone]         [address] [place] [address]    Geo Fong MD Assigned Surgical Provider   8/19/23     Phone: [phone] Fax: [phone]         [address] [place] [address]    Talisha Richard MD Assigned PCP   9/21/23     Phone: [phone] Fax: [phone] [address] [facility] [address]    Jamison Wallace MD Assigned Endocrinology Provider   1/25/24     Phone: [phone] Fax: [phone]         [address] [facility] [address]    Dianne Gonsalez LICSW Lead Care Coordinator Primary Care - CC Admissions 4/10/24     Phone: [phone]         Megan Lai W Community Health Worker  Admissions 4/12/24     Phone: [phone]                     My Care Plans  Self Management and Treatment Plan    Care Plan  Care Plan: Transportation       Problem: Lack of transportation       Goal: Establish reliable transportation       Start Date: 4/12/2024    This Visit's Progress: 30% Recent Progress: 20%    Note:     Goal Statement: I take steps to secure reliable transportation for my upcoming surgery over the next month.  Barriers:  limited time  Strengths: motivated  Patient expressed understanding of goal: yes    Action steps to achieve this goal:  I will review resources and supports sent to me via Mail and my chart: go go grandparent, help at your door, insurance, and my building. I have called my BCBS and they do not have transportation available. I have looked into Go Go Grandparent and have tried calling [employer]. My CHW is sending me a few other options through [employer] I can look into.  I will outreach to supports and consider establishing with ones I might find beneficial.  I will continue to outreach to care coordination as needed for additional resources or supports.                                           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)    ACP (advance care planning)    Atrophic vaginitis    Bilateral leg paresthesia    Breast cancer, stage 2, right (H)    Chronic cough    Chronic diarrhea    Chronic rhinitis    Dry mouth    Gallbladder polyp    History of left breast cancer    Hypercholesteremia    IC (interstitial cystitis)    Ileitis    Impaired fasting glucose    Osteopenia    Peripheral axonal neuropathy    Pharyngeal dysphagia    SOB (shortness of breath)    Tremor, essential    Vitamin D insufficiency    Vitreous degeneration    Post-mastectomy lymphedema syndrome    Contracture of right shoulder    S/P bilateral cataract extraction    Recurrent aspiration pneumonia (H)    Other pulmonary embolism without acute cor pulmonale (H)    Oral phase dysphagia    ADAN (generalized anxiety disorder)    Diverticulitis of intestine, part unspecified, without perforation or abscess without bleeding    GERD (gastroesophageal reflux disease)    Osteoporosis, senile    Vitamin B12 deficiency    Acute midline low back pain without sciatica    Atherosclerotic vascular disease     Colloid thyroid nodule    Estrogen receptor negative status (ER-)    Recurrent UTI    Gastroesophageal reflux disease    History of malignant neoplasm of breast    Major depression, recurrent, full remission (H24)    Mixed stress and urge urinary incontinence    Pulmonary emphysema (H)    Former smoker      Current Medications and Allergies:    Current Outpatient Medications   Medication Sig Dispense Refill    albuterol (ACCUNEB) 1.25 MG/3ML neb solution Take 1.25 mg by nebulization 2 times daily as needed for shortness of breath, wheezing or cough (Patient not taking: Reported on 5/20/2024)      alendronate (FOSAMAX) 70 MG tablet Take 1 tablet (70 mg) by mouth every 7 days 12 tablet 3    calcium 600 MG tablet Take 1 tablet (600 mg) by mouth 2 times daily 180 tablet 3    Cholecalciferol (VITAMIN D3 PO) Take 2,000 Units by mouth daily      Cyanocobalamin (B-12) 1000 MCG TBCR Take 1,000 mcg by mouth daily      escitalopram (LEXAPRO) 10 MG tablet Take 1 tablet (10 mg) by mouth daily 90 tablet 3    Fluticasone-Umeclidin-Vilant (TRELEGY ELLIPTA) 100-62.5-25 MCG/ACT oral inhaler Inhale 1 puff into the lungs daily      omeprazole (PRILOSEC) 20 MG DR capsule Take 1 capsule (20 mg) by mouth daily 90 capsule 3     No current facility-administered medications for this visit.       Allergies   Allergen Reactions    Cephalexin Shortness Of Breath     Bladder burning    Amitriptyline Unknown    Gabapentin Swelling     Shortness of breath    Latex Swelling and Other (See Comments)    Sulfa Antibiotics Unknown    Tetracyclines & Related Unknown        Care Coordination Start Date: 4/10/2024   Frequency of Care Coordination: monthly, more frequently as needed     Form Last Updated: 06/26/2024

## 2024-06-26 NOTE — PROGRESS NOTES
Clinic Care Coordination Contact  Care Coordination Clinician Chart Review    Situation: Patient chart reviewed by Care Coordinator.       Background: Care Coordination Program started: 4/10/2024. Initial assessment completed and patient-centered care plan(s) were developed with participation from patient. Lead CC handed patient off to CHW for continued outreaches.       Assessment: Per chart review, patient outreach completed by CC CHW on 6/12/24.  Patient is actively working to accomplish goal(s). Patient's goal(s) appropriate and relevant at this time. Patient is due for updated Plan of Care.  Assessments will be completed annually or as needed/with change of patient status.      Care Plan: Transportation       Problem: Lack of transportation       Goal: Establish reliable transportation       Start Date: 4/12/2024    This Visit's Progress: 30% Recent Progress: 20%    Note:     Goal Statement: I take steps to secure reliable transportation for my upcoming surgery over the next month.  Barriers: limited time  Strengths: motivated  Patient expressed understanding of goal: yes    Action steps to achieve this goal:  I will review resources and supports sent to me via Mail and my chart: go go grandparent, help at your door, insurance, and my building. I have called my BCBS and they do not have transportation available. I have looked into Go Go Grandparent and have tried calling [employer]. My CHW is sending me a few other options through [employer] I can look into.  I will outreach to supports and consider establishing with ones I might find beneficial.  I will continue to outreach to care coordination as needed for additional resources or supports.                                   Plan/Recommendations: The patient will continue working with Care Coordination to achieve goal(s) as above. CHW will continue outreaches at minimum every 30 days and will involve Lead CC as needed or if patient is ready to move to  Maintenance. Lead CC will continue to monitor CHW outreaches and patient's progress to goal(s) every 6 weeks.     Plan of Care updated and sent to patient: Yes, via [employer]

## 2024-07-05 ENCOUNTER — VIRTUAL VISIT (OUTPATIENT)
Dept: ENDOCRINOLOGY | Facility: CLINIC | Age: 84
End: 2024-07-05
Payer: MEDICARE

## 2024-07-05 DIAGNOSIS — M81.8 OTHER OSTEOPOROSIS WITHOUT CURRENT PATHOLOGICAL FRACTURE: ICD-10-CM

## 2024-07-05 DIAGNOSIS — E04.1 THYROID NODULE: Primary | ICD-10-CM

## 2024-07-05 PROCEDURE — 99215 OFFICE O/P EST HI 40 MIN: CPT | Mod: 95 | Performed by: INTERNAL MEDICINE

## 2024-07-05 PROCEDURE — G2211 COMPLEX E/M VISIT ADD ON: HCPCS | Mod: 95 | Performed by: INTERNAL MEDICINE

## 2024-07-05 ASSESSMENT — PAIN SCALES - GENERAL: PAINLEVEL: NO PAIN (0)

## 2024-07-05 NOTE — PROGRESS NOTES
Video-Visit Details    Type of service:  Video Visit  Video Start Time: 0905  Video End Time: 0940  Originating Location (pt. Location): Home, [place]  Distant Location (provider location):  Home  Platform used for Video Visit: Rochelle Wallace MD      HISTORY OF PRESENT ILLNESS  Jody Ch is a 83 year old female with h/o breast cancer, chronic cough, pharyngeal dysphagia, MDD who is here for FU thyroid nodule.    Interval History  Voice change is about the same.  Pt is open for surgery    5/2024  I called and left voicemail message (3), hope I was able to discuss the result with you on the phone.      Here are my comments about the recent results: Compare to 10/2023, no significant change in thyroid nodule that was previously FNA.  Your biopsy result in 12/2023 showed AUS, and Afirma suspicious, risk of cancer is about 50%. In general, we recommend surgery.  However, given your situation and would like to avoid surgery if nodule is not cancer, we tried to send an alternative test of Afirma called Thygenext.  Both Afirma and Thegenext are good and both tests have similar performance to determine if nodule is benign or cancer, however, the test are difference.  We are hopeful to send your most recent FNA for Thygenext.   We found out that your insurance will only cover the test if there's significant change in the nodule.  We did an ultrasound and there's no significant change of your previously FNA nodule, so Thygenext will not be covered.     In general, we recommend surgery to remove half of the thyroid (the side that has a suspicious nodule).  Please let me know if you'd like me to call to discuss, otherwise, we can discuss more in details at follow up.    Initial visit  Pt has breast cancer and had a scan and incidentally found of thyroid nodule.  FNA 2018.  Pt moved and re-established  care with PCP, got US, nodule has grown and got a repeat FNA.  New hoarsness for >1 year, comes and goes  Choking  with swallowing, got test at [facility]. Swallowing problem with liquid, and solid food. Eg. Hard boil egg, feeling like food stuck and need to drink water to follow  No changes in breathing    Brother s/p thyroid surgery  Cousin with thyroid problems    Currently breast cancer, 5 years in remission    REVIEW OF SYSTEMS  10 point negative except as mentioned in HPI    Past Medical/Surgical History:  Past Medical History:   Diagnosis Date    Anxiety state 01/31/2017    Arthritis     Atrophic vaginitis 05/08/2014    Overview:  UPDATE: Followed-up with Dr. Cassie Arteaga on 5-8-14. Noted that patient did not want to pay for the estrogen cream. Impression female stress incontinence and atrophic vaginitis.    Bilateral leg paresthesia 05/13/2014    Overview:  US FRANCISCO W EXERCISE BILATERAL 5-14-14: IMPRESSION: RIGHT LOWER EXTREMITY: FRANCISCO at rest is normal with a normal response to exercise. These findings would not be consistent with symptoms of arterial claudication. LEFT LOWER EXTREMITY: FRANCISCO at rest is normal with a normal response to exercise. These findings would not be consistent with symptoms of arterial claudication. US VENOUS LOWER EXTREM    Bone pain 07/24/2018    Breast cancer (H)     Breast cancer, stage 2, right (H) 05/16/2018    Overview:  Added automatically from request for surgery [id number]    Chronic cough 02/12/2018    Overview:  XR CHEST 2 VIEWS PA AND LATERAL 12-4-17: Normal heart size and pulmonary vascularity. Tiny granulomas with some fibrosis in the right lung base. The left lung is clear. Slight deviation of the upper trachea from left to right presumably due to thyroid enlargement stable. No change from previous. No acute process is identified. No focal pulmonary infiltrates.    Chronic diarrhea 05/12/2017    Overview:  UPDATE: COLONOSCOPY DIAGNOSTIC 7-25-17: Aphthous ulcer of ileum. Diverticulosis of colon without diverticulitis. Pathology Results: NEOTERMINAL ILEUM,  BIOPSY: Nonspecific chronic  active ileitis. No dysplasia or malignancy. COLON, RANDOM, BIOPSY: Normal colonic mucosa STOOL TESTS on 5-12-17 for culture, Clostridium dificile toxin, WBC, Giardia antigen, Campylobacter, Shiga toxin, Stool f    Chronic pain of right knee 10/23/2017    Overview:  XR KNEE 3 VIEWS RIGHT 10-23-17: Negative knee. No fracture or dislocation. No joint effusion.    Chronic rhinitis 06/08/2017    Chronic right hip pain 10/23/2017    Overview:  XR HIP 2 OR 3 VIEWS W PELVIS RIGHT 10-23-17: Arthritic change right hip. Pelvis otherwise negative.    Decreased range of motion of right shoulder 10/08/2018    Depressive disorder     Diarrhea 07/24/2018    Difficult or painful urination 12/08/2009    Overview:  UPDATE: Followed-up with Dr. Cassie Arteaga on 5-8-14. Noted that patient did not want to pay for the estrogen cream. Impression female stress incontinence and atrophic vaginitis. Exacerbation of her dysuria symptoms. She has not used her estrace vaginal cream due to cost noted 9-10-13. Urinalysis negative on 9-10-13. Urinalysis and urine microscopy showed some signs of urinary tract infecti    Drug-induced nausea and vomiting 07/17/2018    Dry mouth 05/24/2016    Gallbladder polyp 04/14/2012    Overview:  UPDATE:US ABDOMEN LIMITED RUQ 4-18-16: 5 mm stone versus polyp in the gallbladder, decreased in size since comparison study where measured 7 mm. The gallbladder is contracted despite being NPO, which limits evaluation. Benign parapelvic cyst in the lower pole of the right kidney measuring 2.8 cm, is unchanged. US ABDOMEN LIMITED RUQ on 2-3-14:  Stable appearance of a 8 mm cholesterol tristin    Ganglion cyst of flexor tendon sheath of finger of right hand 05/24/2016    History of breast cancer 11/21/2006    Overview:  UPDATE: XR MAMMO UNI SCREEN FFDM RIGHT 4-14-14: ACR 1 Negative. Followed-up with oncologist Dr. Phyllis Colon on 4-29-14. Stable.    XR MAMMO UNI SCREEN FFDM RIGHT: 4-4-12, 4-5-13: ACR 2 Benign Finding.  Followed-up with Dr. Phyllis Colon 5-23-13. CBC and CMP normal except for low glucose of 55 . CA 27-29 normal.   Followed-up with Dr. Phyllis Colon 11-16-12. CBC and CMP normal exce    Hypercholesteremia 12/08/2014    Overview:  UPDATE: Rx atorvastatin (Lipitor) 1-22-15. She sent medical message on 3-18-15 requesting to take atorvastatin (Lipitor) 20 mg tablet every other day. This would not work for atorvastatin (Lipitor) . I advised to take half tablet (10 mg) daily rather than taking one tablet every other day. ASCVD Risk  10 year risk 7.9 % calculated on 12/8/2014.  Recommendation moderate to high -    IC (interstitial cystitis) 12/02/2010    Overview:  UPDATE: UPDATE: Followed-up with Dr. Cassie Arteaga on 5-8-14. Noted that patient did not want to pay for the estrogen cream. Impression female stress incontinence and atrophic vaginitis. Exacerbation of her dysuria symptoms. She has not used her estrace vaginal cream due to cost noted 9-10-13. Urinalysis negative on 9-10-13. Urinalysis and urine microscopy showed some signs of urinary tract    Ileitis 07/25/2017    Overview:  COLONOSCOPY DIAGNOSTIC 7-25-17: Aphthous ulcer of ileum. Diverticulosis of colon without diverticulitis. Pathology Results: NEOTERMINAL ILEUM,  BIOPSY: Nonspecific chronic active ileitis. No dysplasia or malignancy. COLON, RANDOM, BIOPSY: Normal colonic mucosa . Advised to follow-up with [place] [facility] regarding chronic active ileitis.    Impaired fasting glucose 12/01/2006    Insomnia secondary to depression with anxiety 01/31/2017    Left leg swelling 05/13/2014    Overview:  US VENOUS LOWER EXTREMITY LEFT 5-14-14: Left leg veins are negative for DVT.    Left thyroid nodule 04/30/2018    Lymphedema of extremity 10/08/2018    Major depression, recurrent, full remission (H24) 01/31/2017    Overview:  Inpatient treatment for depression after divorce in 1983. Major depression diagnosed due to financial worries diagnosed 11-23-10 by  oncologist, Louise Burns. Citalopram 20 mg daily started. Citalopram discontinued on 5-19-11 per patient request.    Malignant neoplasm of upper-inner quadrant of right breast in female, 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) 10/08/2018    Mixed stress and urge urinary incontinence 07/24/2006    Overview:  UPDATE: Followed-up with Dr. Cassie Arteaga on 5-8-14. Noted that patient did not want to pay for the estrogen cream. Impression female stress incontinence and atrophic vaginitis.  Exacerbation of her dysuria symptoms. She has not used her estrace vaginal cream due to cost noted 9-10-13. Urinalysis negative on 9-10-13. Urinalysis and urine microscopy showed some signs of urinary tract infect    Nasal congestion 06/05/2014    Osteopenia 11/01/2006    Overview:  UPDATE:  XR DXA BONE DENSITY 2 SITES AXIAL 5/16/2018: Osteopenia. Lumbar Spine L1-L4 T-score -1.8 . Mean Bilateral Femoral Neck T-score -1.4 . FRAX Results: 10-year probability of major osteoporotic fracture is 10.8%, and of hip fracture is 2%, based on left femoral neck BMD. Basic bone health recommended.  XR DXA BONE DENSITY 2 SITES AXIAL 4-18-16: T score AP spine -2.1, Left femoral ne    Other emphysema (H) 04/30/2018    Other specified disorders of nose and nasal sinuses 07/24/2018    Peripheral axonal neuropathy 11/22/2011    Overview:  UPDATE:  She thinks gabapentin (Neurontin) has been helpful without side effect of leg swelling discussed on 11-20-14. She agreed to increase dose to 300 mg at bedtime. She called on 10-30-14 requesting for gabapentin (Neurontin) as nortriptyline not helpful. Reminded her that she complained of leg swelling with gabapentin (Neurontin) in the past.  EMG 5-19-14: Borderline abnormal EMG du    Personal history of tobacco use, presenting hazards to health 10/22/2018    Overview:  20 pack years    Pharyngeal dysphagia 12/08/2009    Recurrent UTI 09/12/2013    Overview:  UPDATE: Followed-up with Dr. Cassie Arteaga on 5-8-14. Noted that  patient did not want to pay for the estrogen cream. Impression female stress incontinence and atrophic vaginitis.  Exacerbation of her dysuria symptoms. She has not used her estrace vaginal cream due to cost noted 9-10-13. Urinalysis negative on 9-10-13. Urinalysis and urine microscopy showed some signs of urinary tract infect    Salivation excessive 12/17/2008    Overview:  may be due to postnasal drip causing excessive salivation as she tends to swallow the postnasal drainage.She is not candidate for tricyclic antidepressant like nortriptyline (can cause dry mouth) to lessen salivary secretions as this would excerbate her dry lips.    SOB (shortness of breath) 05/13/2014    Overview:  XR CHEST 2 VIEWS PA AND LATERAL 5-13-14: Impression: On the lateral view, a small patchy opacity is again visualized and has a few linear markings. This may be chronic, it is suggested on the prior exam slightly more prominent but this is probably due to technique, could represent chronic areas of subsegmental volume loss or previous consolidation. It is not well visualized on the PA vie    Tremor, essential 11/22/2011    Overview:  Neurology consult Dr. Luther Armenta on 3-26-12. Impression essential tremor. MRI brain. Neurontin 100 mg bid started to help tremors and bilateral lower extremity paresthesia thought due to impaired FG or prediabetes. Normal EMG of lower extremity on 4-11-12 but compound motor action potentials low which can be seen in early axonal neuropathy. Developed swelling with gabapentin (Neurontin    Vitamin D insufficiency 12/03/2012    Overview:  Vitamin D was low at 28.1 on 12-3-12. Take vitamin D 3000 units daily for 8 weeks, then take vitamin D 2000 units indefinitely. Vitamin D was normal at 40.8 on 3-6-13. Continue vitamin D 2000 units indefinitely.    Vitreous degeneration 10/22/2018    Overview:  Right eye     Past Surgical History:   Procedure Laterality Date    ABDOMEN SURGERY      APPENDECTOMY       BLEPHAROPLASTY Bilateral 11/07/2013    FINGER GANGLION CYST EXCISION Right 06/23/2016    right ring finger    LUMPECTOMY BREAST  11/21/2006    Infiltrating ductal carcinoma, micropapillary variant, Marquez    MASTECTOMY MODIFIED RADICAL Left 12/06/2006    OTHER SURGICAL HISTORY Right 11/07/2013    MO REMOVE EYELID LESN (NOT CHALAZION)    OTHER SURGICAL HISTORY Right 05/18/2018    US BIOPSY BREAST NEEDLE W BIBIANA W GUIDE RIGHT    OTHER SURGICAL HISTORY Right 06/01/2018    RIGHT SIMPLE MASTECTOMY WITH RIGHT SENTINAL LYMPH NODE [id number] AND RIGHT AXILLARY NODE DISSECTION       Medications  Current Outpatient Medications   Medication Sig Dispense Refill    albuterol (ACCUNEB) 1.25 MG/3ML neb solution Take 1.25 mg by nebulization 2 times daily as needed for shortness of breath, wheezing or cough (Patient not taking: Reported on 5/20/2024)      alendronate (FOSAMAX) 70 MG tablet Take 1 tablet (70 mg) by mouth every 7 days 12 tablet 3    calcium 600 MG tablet Take 1 tablet (600 mg) by mouth 2 times daily 180 tablet 3    Cholecalciferol (VITAMIN D3 PO) Take 2,000 Units by mouth daily      Cyanocobalamin (B-12) 1000 MCG TBCR Take 1,000 mcg by mouth daily      escitalopram (LEXAPRO) 10 MG tablet Take 1 tablet (10 mg) by mouth daily 90 tablet 3    Fluticasone-Umeclidin-Vilant (TRELEGY ELLIPTA) 100-62.5-25 MCG/ACT oral inhaler Inhale 1 puff into the lungs daily      omeprazole (PRILOSEC) 20 MG DR capsule Take 1 capsule (20 mg) by mouth daily 90 capsule 3     No current facility-administered medications for this visit.       Allergies  Allergies   Allergen Reactions    Cephalexin Shortness Of Breath     Bladder burning    Amitriptyline Unknown    Gabapentin Swelling     Shortness of breath    Latex Swelling and Other (See Comments)    Sulfa Antibiotics Unknown    Tetracyclines & Related Unknown         Family History  family history includes Alcoholism in her mother; Allergic rhinitis in her sister; Arthritis in her maternal  grandmother; Asthma in her sister; Breast Cancer in her maternal cousin; Cancer in her maternal grandmother; Diabetes in her brother and brother; Hypertension in her brother, brother, maternal grandmother, and mother; Kidney Disease in her father; Osteoporosis in her maternal grandmother; Prostate Cancer in her father; Seizure Disorder in her father; Substance Abuse in her mother; Thyroid Disease in her brother; Ulcers in her mother.    Social History  Social History     Tobacco Use    Smoking status: Former     Current packs/day: 0.00     Average packs/day: 0.5 packs/day for 40.0 years (20.0 ttl pk-yrs)     Types: Cigarettes     Start date: 1965     Quit date: 2005     Years since quittin.4    Smokeless tobacco: Never   Substance Use Topics    Alcohol use: No     Comment: Alcoholic Drinks/day: occassionaly        Physical Exam  There were no vitals taken for this visit.  There is no height or weight on file to calculate BMI.  GENERAL :  In no apparent distress  NEURO: awake, alert, responds appropriately to questions.      DATA REVIEW  Labs/Imaging    Lab Results - FNA Cytology APN (2018 10:44 AM CDT)  Component Value Ref Range Test Method Analysis Time Performed At Pathologist Signature   Case Report Medical Cytology Report                           Case: [id number]                                  Authorizing Provider:  Phyllis Colon MD Collected:           2018 1044              Ordering Location:     [facility]    Received:            2018 1044                                    Imaging                                                                      Pathologist:           Sultana Levin MD                                                        Specimen:    Left Thyroid                                                                             2018 11:19 AM CDT [facility] LAB[facility]AT[facility] LAB[facility]AT[facility]     Final Diagnosis A) THYROID, LEFT,  ULTRASOUND-GUIDED FINE NEEDLE ASPIRATION:  1. Benign colloid nodule  2. Negative for malignancy in this sample  3. See microscopic description and comment          [facility] MEDICAL IMAGING   US THYROID/PARATHYROID   7/3/2018 10:47 AM     INDICATION: Thyroid nodule.   TECHNIQUE: Routine.   COMPARISON: CT 6/13/2018.     FINDINGS:   RIGHT thyroid lobe measures 3.6 x 1.3 x 1.2 cm. Normal echotexture with no focal   nodule.    LEFT thyroid lobe measures 5.0 x 4.1 x 3.9 cm. Solid nodule involving most of   the mid left lobe of the thyroid.   Nodule: 4.4 x 3.8 x 3.4 cm   Composition: Solid or almost completely solid 2   Echogenicity: Hyperechoic or isoechoic 1   Shape: Wider-than-tall 0   Margin: Smooth 0   Echogenic Foci: None, or large comet-tail artifacts 0   Point Total: 3 points. TI-RADS 3. FNA if >=2.5 cm. Follow if >=1.5 cm.     Isthmus measures 3 mm. No additional findings.     No cervical lymphadenopathy.     CONCLUSION:   1. Nodule in the left lobe of the thyroid meets criteria at which fine-needle   aspiration is suggested. This corresponds to the CT and PET scan findings. See   biopsy report.       TSH   Date Value Ref Range Status   10/13/2023 0.81 0.30 - 4.20 uIU/mL Final       12/14/ 2023 FNA AUS, Afirma suspicious    FNA 4/24/24  FOLLICULAR LESION OF UNDETERMINED SIGNIFICANCE (BETHESDA CATEGORY III)     ASSESSMENT/PLAN:   ## 5 cm  Left Thyroid nodules, FNA benign 2018 and FNA AUS 12/2023, Afirma suspicious, ROM 50%, repeat FNA FLUS  ## Mild obstructive symptoms  She has mild obstructive symptoms which are tolerable for now  She is still unclear if she wants surgery given her age  We discussed that in general, for Afirma suspicious, we recommend surgery.  Given her initial hesitant to surgery, plan to check ThyGeNEXT/ThyraMIR, however unable to do given insurance policy.  Repeat FNA 4/2024 FLUS.  Today we discuss surgery, patient would like to proceed.  Discuss risk of surgery and 50% chance of hypothyroid after  surgery.  Pt prefers female surgeon  -- ENT referral to Dr. Jose for left thyroidectomy    Follow-up 3 months    The longitudinal plan of care for the diagnosis(es)/condition(s) as documented were addressed during this visit. Due to the added complexity in care, I will continue to support Mikala in the subsequent management and with ongoing continuity of care.      Jamison Wallace MD

## 2024-07-05 NOTE — LETTER
7/5/2024      Jody Ch  [id number]  [facility] [address]  [facility] [address]      Dear Colleague,    Thank you for referring your patient, Jody Ch, to the [facility] [place]. Please see a copy of my visit note below.    Video-Visit Details    Type of service:  Video Visit  Video Start Time: 0905  Video End Time: 0940  Originating Location (pt. Location): Home, [place]  Distant Location (provider location):  Home  Platform used for Video Visit: Rochelle Wallace MD      HISTORY OF PRESENT ILLNESS  Jody Ch is a 83 year old female with h/o breast cancer, chronic cough, pharyngeal dysphagia, MDD who is here for FU thyroid nodule.    Interval History  Voice change is about the same.  Pt is open for surgery    5/2024  I called and left voicemail message (3), hope I was able to discuss the result with you on the phone.      Here are my comments about the recent results: Compare to 10/2023, no significant change in thyroid nodule that was previously FNA.  Your biopsy result in 12/2023 showed AUS, and Afirma suspicious, risk of cancer is about 50%. In general, we recommend surgery.  However, given your situation and would like to avoid surgery if nodule is not cancer, we tried to send an alternative test of Afirma called Thygenext.  Both Afirma and Thegenext are good and both tests have similar performance to determine if nodule is benign or cancer, however, the test are difference.  We are hopeful to send your most recent FNA for Thygenext.   We found out that your insurance will only cover the test if there's significant change in the nodule.  We did an ultrasound and there's no significant change of your previously FNA nodule, so Thygenext will not be covered.     In general, we recommend surgery to remove half of the thyroid (the side that has a suspicious nodule).  Please let me know if you'd like me to call to discuss, otherwise, we can discuss more in details at follow  up.    Initial visit  Pt has breast cancer and had a scan and incidentally found of thyroid nodule.  FNA 2018.  Pt moved and re-established  care with PCP, got US, nodule has grown and got a repeat FNA.  New hoarsness for >1 year, comes and goes  Choking with swallowing, got test at [facility]. Swallowing problem with liquid, and solid food. Eg. Hard boil egg, feeling like food stuck and need to drink water to follow  No changes in breathing    Brother s/p thyroid surgery  Cousin with thyroid problems    Currently breast cancer, 5 years in remission    REVIEW OF SYSTEMS  10 point negative except as mentioned in HPI    Past Medical/Surgical History:  Past Medical History:   Diagnosis Date     Anxiety state 01/31/2017     Arthritis      Atrophic vaginitis 05/08/2014    Overview:  UPDATE: Followed-up with Dr. Cassie Arteaga on 5-8-14. Noted that patient did not want to pay for the estrogen cream. Impression female stress incontinence and atrophic vaginitis.     Bilateral leg paresthesia 05/13/2014    Overview:  US FRANCISCO W EXERCISE BILATERAL 5-14-14: IMPRESSION: RIGHT LOWER EXTREMITY: FRANCISCO at rest is normal with a normal response to exercise. These findings would not be consistent with symptoms of arterial claudication. LEFT LOWER EXTREMITY: FRANCISCO at rest is normal with a normal response to exercise. These findings would not be consistent with symptoms of arterial claudication. US VENOUS LOWER EXTREM     Bone pain 07/24/2018     Breast cancer (H)      Breast cancer, stage 2, right (H) 05/16/2018    Overview:  Added automatically from request for surgery [id number]     Chronic cough 02/12/2018    Overview:  XR CHEST 2 VIEWS PA AND LATERAL 12-4-17: Normal heart size and pulmonary vascularity. Tiny granulomas with some fibrosis in the right lung base. The left lung is clear. Slight deviation of the upper trachea from left to right presumably due to thyroid enlargement stable. No change from previous. No acute process is  identified. No focal pulmonary infiltrates.     Chronic diarrhea 05/12/2017    Overview:  UPDATE: COLONOSCOPY DIAGNOSTIC 7-25-17: Aphthous ulcer of ileum. Diverticulosis of colon without diverticulitis. Pathology Results: NEOTERMINAL ILEUM,  BIOPSY: Nonspecific chronic active ileitis. No dysplasia or malignancy. COLON, RANDOM, BIOPSY: Normal colonic mucosa STOOL TESTS on 5-12-17 for culture, Clostridium dificile toxin, WBC, Giardia antigen, Campylobacter, Shiga toxin, Stool f     Chronic pain of right knee 10/23/2017    Overview:  XR KNEE 3 VIEWS RIGHT 10-23-17: Negative knee. No fracture or dislocation. No joint effusion.     Chronic rhinitis 06/08/2017     Chronic right hip pain 10/23/2017    Overview:  XR HIP 2 OR 3 VIEWS W PELVIS RIGHT 10-23-17: Arthritic change right hip. Pelvis otherwise negative.     Decreased range of motion of right shoulder 10/08/2018     Depressive disorder      Diarrhea 07/24/2018     Difficult or painful urination 12/08/2009    Overview:  UPDATE: Followed-up with Dr. Cassie Arteaga on 5-8-14. Noted that patient did not want to pay for the estrogen cream. Impression female stress incontinence and atrophic vaginitis. Exacerbation of her dysuria symptoms. She has not used her estrace vaginal cream due to cost noted 9-10-13. Urinalysis negative on 9-10-13. Urinalysis and urine microscopy showed some signs of urinary tract infecti     Drug-induced nausea and vomiting 07/17/2018     Dry mouth 05/24/2016     Gallbladder polyp 04/14/2012    Overview:  UPDATE:US ABDOMEN LIMITED RUQ 4-18-16: 5 mm stone versus polyp in the gallbladder, decreased in size since comparison study where measured 7 mm. The gallbladder is contracted despite being NPO, which limits evaluation. Benign parapelvic cyst in the lower pole of the right kidney measuring 2.8 cm, is unchanged. US ABDOMEN LIMITED RUQ on 2-3-14:  Stable appearance of a 8 mm cholesterol tristin     Ganglion cyst of flexor tendon sheath of finger of right  hand 05/24/2016     History of breast cancer 11/21/2006    Overview:  UPDATE: XR MAMMO UNI SCREEN FFDM RIGHT 4-14-14: ACR 1 Negative. Followed-up with oncologist Dr. Phyllis Colon on 4-29-14. Stable.    XR MAMMO UNI SCREEN FFDM RIGHT: 4-4-12, 4-5-13: ACR 2 Benign Finding. Followed-up with Dr. Phyllis Colon 5-23-13. CBC and CMP normal except for low glucose of 55 . CA 27-29 normal.   Followed-up with Dr. Phyllis Colon 11-16-12. CBC and CMP normal exce     Hypercholesteremia 12/08/2014    Overview:  UPDATE: Rx atorvastatin (Lipitor) 1-22-15. She sent medical message on 3-18-15 requesting to take atorvastatin (Lipitor) 20 mg tablet every other day. This would not work for atorvastatin (Lipitor) . I advised to take half tablet (10 mg) daily rather than taking one tablet every other day. ASCVD Risk  10 year risk 7.9 % calculated on 12/8/2014.  Recommendation moderate to high -     IC (interstitial cystitis) 12/02/2010    Overview:  UPDATE: UPDATE: Followed-up with Dr. Cassie Arteaga on 5-8-14. Noted that patient did not want to pay for the estrogen cream. Impression female stress incontinence and atrophic vaginitis. Exacerbation of her dysuria symptoms. She has not used her estrace vaginal cream due to cost noted 9-10-13. Urinalysis negative on 9-10-13. Urinalysis and urine microscopy showed some signs of urinary tract     Ileitis 07/25/2017    Overview:  COLONOSCOPY DIAGNOSTIC 7-25-17: Aphthous ulcer of ileum. Diverticulosis of colon without diverticulitis. Pathology Results: NEOTERMINAL ILEUM,  BIOPSY: Nonspecific chronic active ileitis. No dysplasia or malignancy. COLON, RANDOM, BIOPSY: Normal colonic mucosa . Advised to follow-up with [place] [facility] regarding chronic active ileitis.     Impaired fasting glucose 12/01/2006     Insomnia secondary to depression with anxiety 01/31/2017     Left leg swelling 05/13/2014    Overview:  US VENOUS LOWER EXTREMITY LEFT 5-14-14: Left leg veins are negative for DVT.     Left  thyroid nodule 04/30/2018     Lymphedema of extremity 10/08/2018     Major depression, recurrent, full remission (H24) 01/31/2017    Overview:  Inpatient treatment for depression after divorce in 1983. Major depression diagnosed due to financial worries diagnosed 11-23-10 by oncologist, Louise Burns. Citalopram 20 mg daily started. Citalopram discontinued on 5-19-11 per patient request.     Malignant neoplasm of upper-inner quadrant of right breast in female, 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) 10/08/2018     Mixed stress and urge urinary incontinence 07/24/2006    Overview:  UPDATE: Followed-up with Dr. Cassie Arteaga on 5-8-14. Noted that patient did not want to pay for the estrogen cream. Impression female stress incontinence and atrophic vaginitis.  Exacerbation of her dysuria symptoms. She has not used her estrace vaginal cream due to cost noted 9-10-13. Urinalysis negative on 9-10-13. Urinalysis and urine microscopy showed some signs of urinary tract infect     Nasal congestion 06/05/2014     Osteopenia 11/01/2006    Overview:  UPDATE:  XR DXA BONE DENSITY 2 SITES AXIAL 5/16/2018: Osteopenia. Lumbar Spine L1-L4 T-score -1.8 . Mean Bilateral Femoral Neck T-score -1.4 . FRAX Results: 10-year probability of major osteoporotic fracture is 10.8%, and of hip fracture is 2%, based on left femoral neck BMD. Basic bone health recommended.  XR DXA BONE DENSITY 2 SITES AXIAL 4-18-16: T score AP spine -2.1, Left femoral ne     Other emphysema (H) 04/30/2018     Other specified disorders of nose and nasal sinuses 07/24/2018     Peripheral axonal neuropathy 11/22/2011    Overview:  UPDATE:  She thinks gabapentin (Neurontin) has been helpful without side effect of leg swelling discussed on 11-20-14. She agreed to increase dose to 300 mg at bedtime. She called on 10-30-14 requesting for gabapentin (Neurontin) as nortriptyline not helpful. Reminded her that she complained of leg swelling with gabapentin (Neurontin) in the past.  EMG  5-19-14: Borderline abnormal EMG du     Personal history of tobacco use, presenting hazards to health 10/22/2018    Overview:  20 pack years     Pharyngeal dysphagia 12/08/2009     Recurrent UTI 09/12/2013    Overview:  UPDATE: Followed-up with Dr. Cassie Arteaga on 5-8-14. Noted that patient did not want to pay for the estrogen cream. Impression female stress incontinence and atrophic vaginitis.  Exacerbation of her dysuria symptoms. She has not used her estrace vaginal cream due to cost noted 9-10-13. Urinalysis negative on 9-10-13. Urinalysis and urine microscopy showed some signs of urinary tract infect     Salivation excessive 12/17/2008    Overview:  may be due to postnasal drip causing excessive salivation as she tends to swallow the postnasal drainage.She is not candidate for tricyclic antidepressant like nortriptyline (can cause dry mouth) to lessen salivary secretions as this would excerbate her dry lips.     SOB (shortness of breath) 05/13/2014    Overview:  XR CHEST 2 VIEWS PA AND LATERAL 5-13-14: Impression: On the lateral view, a small patchy opacity is again visualized and has a few linear markings. This may be chronic, it is suggested on the prior exam slightly more prominent but this is probably due to technique, could represent chronic areas of subsegmental volume loss or previous consolidation. It is not well visualized on the PA vie     Tremor, essential 11/22/2011    Overview:  Neurology consult Dr. Luther Armenta on 3-26-12. Impression essential tremor. MRI brain. Neurontin 100 mg bid started to help tremors and bilateral lower extremity paresthesia thought due to impaired FG or prediabetes. Normal EMG of lower extremity on 4-11-12 but compound motor action potentials low which can be seen in early axonal neuropathy. Developed swelling with gabapentin (Neurontin     Vitamin D insufficiency 12/03/2012    Overview:  Vitamin D was low at 28.1 on 12-3-12. Take vitamin D 3000 units daily for 8 weeks, then  take vitamin D 2000 units indefinitely. Vitamin D was normal at 40.8 on 3-6-13. Continue vitamin D 2000 units indefinitely.     Vitreous degeneration 10/22/2018    Overview:  Right eye     Past Surgical History:   Procedure Laterality Date     ABDOMEN SURGERY       APPENDECTOMY       BLEPHAROPLASTY Bilateral 11/07/2013     FINGER GANGLION CYST EXCISION Right 06/23/2016    right ring finger     LUMPECTOMY BREAST  11/21/2006    Infiltrating ductal carcinoma, micropapillary variant, Janey     MASTECTOMY MODIFIED RADICAL Left 12/06/2006     OTHER SURGICAL HISTORY Right 11/07/2013    WY REMOVE EYELID LESN (NOT CHALAZION)     OTHER SURGICAL HISTORY Right 05/18/2018    US BIOPSY BREAST NEEDLE W BIBIANA W GUIDE RIGHT     OTHER SURGICAL HISTORY Right 06/01/2018    RIGHT SIMPLE MASTECTOMY WITH RIGHT SENTINAL LYMPH NODE [id number] AND RIGHT AXILLARY NODE DISSECTION       Medications  Current Outpatient Medications   Medication Sig Dispense Refill     albuterol (ACCUNEB) 1.25 MG/3ML neb solution Take 1.25 mg by nebulization 2 times daily as needed for shortness of breath, wheezing or cough (Patient not taking: Reported on 5/20/2024)       alendronate (FOSAMAX) 70 MG tablet Take 1 tablet (70 mg) by mouth every 7 days 12 tablet 3     calcium 600 MG tablet Take 1 tablet (600 mg) by mouth 2 times daily 180 tablet 3     Cholecalciferol (VITAMIN D3 PO) Take 2,000 Units by mouth daily       Cyanocobalamin (B-12) 1000 MCG TBCR Take 1,000 mcg by mouth daily       escitalopram (LEXAPRO) 10 MG tablet Take 1 tablet (10 mg) by mouth daily 90 tablet 3     Fluticasone-Umeclidin-Vilant (TRELEGY ELLIPTA) 100-62.5-25 MCG/ACT oral inhaler Inhale 1 puff into the lungs daily       omeprazole (PRILOSEC) 20 MG DR capsule Take 1 capsule (20 mg) by mouth daily 90 capsule 3     No current facility-administered medications for this visit.       Allergies  Allergies   Allergen Reactions     Cephalexin Shortness Of Breath     Bladder burning      Amitriptyline Unknown     Gabapentin Swelling     Shortness of breath     Latex Swelling and Other (See Comments)     Sulfa Antibiotics Unknown     Tetracyclines & Related Unknown         Family History  family history includes Alcoholism in her mother; Allergic rhinitis in her sister; Arthritis in her maternal grandmother; Asthma in her sister; Breast Cancer in her maternal cousin; Cancer in her maternal grandmother; Diabetes in her brother and brother; Hypertension in her brother, brother, maternal grandmother, and mother; Kidney Disease in her father; Osteoporosis in her maternal grandmother; Prostate Cancer in her father; Seizure Disorder in her father; Substance Abuse in her mother; Thyroid Disease in her brother; Ulcers in her mother.    Social History  Social History     Tobacco Use     Smoking status: Former     Current packs/day: 0.00     Average packs/day: 0.5 packs/day for 40.0 years (20.0 ttl pk-yrs)     Types: Cigarettes     Start date: 1965     Quit date: 2005     Years since quittin.4     Smokeless tobacco: Never   Substance Use Topics     Alcohol use: No     Comment: Alcoholic Drinks/day: occassionaly        Physical Exam  There were no vitals taken for this visit.  There is no height or weight on file to calculate BMI.  GENERAL :  In no apparent distress  NEURO: awake, alert, responds appropriately to questions.      DATA REVIEW  Labs/Imaging    Lab Results - FNA Cytology APN (2018 10:44 AM CDT)  Component Value Ref Range Test Method Analysis Time Performed At Pathologist Signature   Case Report Medical Cytology Report                           Case: [id number]                                  Authorizing Provider:  Phyllis Colon MD Collected:           2018 1044              Ordering Location:     [facility]    Received:            2018 1044                                    Imaging                                                                       Pathologist:           Sultana Levin MD                                                        Specimen:    Left Thyroid                                                                             07/05/2018 11:19 AM T [facility] LABORATORY-C[facility]AL LABORATORY     Final Diagnosis A) THYROID, LEFT, ULTRASOUND-GUIDED FINE NEEDLE ASPIRATION:  1. Benign colloid nodule  2. Negative for malignancy in this sample  3. See microscopic description and comment          [facility] MEDICAL IMAGING   US THYROID/PARATHYROID   7/3/2018 10:47 AM     INDICATION: Thyroid nodule.   TECHNIQUE: Routine.   COMPARISON: CT 6/13/2018.     FINDINGS:   RIGHT thyroid lobe measures 3.6 x 1.3 x 1.2 cm. Normal echotexture with no focal   nodule.    LEFT thyroid lobe measures 5.0 x 4.1 x 3.9 cm. Solid nodule involving most of   the mid left lobe of the thyroid.   Nodule: 4.4 x 3.8 x 3.4 cm   Composition: Solid or almost completely solid 2   Echogenicity: Hyperechoic or isoechoic 1   Shape: Wider-than-tall 0   Margin: Smooth 0   Echogenic Foci: None, or large comet-tail artifacts 0   Point Total: 3 points. TI-RADS 3. FNA if >=2.5 cm. Follow if >=1.5 cm.     Isthmus measures 3 mm. No additional findings.     No cervical lymphadenopathy.     CONCLUSION:   1. Nodule in the left lobe of the thyroid meets criteria at which fine-needle   aspiration is suggested. This corresponds to the CT and PET scan findings. See   biopsy report.       TSH   Date Value Ref Range Status   10/13/2023 0.81 0.30 - 4.20 uIU/mL Final       12/14/ 2023 FNA AUS, Afirma suspicious    FNA 4/24/24  FOLLICULAR LESION OF UNDETERMINED SIGNIFICANCE (BETHESDA CATEGORY III)     ASSESSMENT/PLAN:   ## 5 cm  Left Thyroid nodules, FNA benign 2018 and FNA AUS 12/2023, Afirma suspicious, ROM 50%, repeat FNA FLUS  ## Mild obstructive symptoms  She has mild obstructive symptoms which are tolerable for now  She is still unclear if she wants surgery given her age  We discussed that in  general, for Afirmcindy suspicious, we recommend surgery.  Given her initial hesitant to surgery, plan to check ThyGeNEXT/ThyraMIR, however unable to do given insurance policy.  Repeat FNA 4/2024 FLUS.  Today we discuss surgery, patient would like to proceed.  Discuss risk of surgery and 50% chance of hypothyroid after surgery.  Pt prefers female surgeon  -- ENT referral to Dr. Jose for left thyroidectomy    Follow-up 3 months    The longitudinal plan of care for the diagnosis(es)/condition(s) as documented were addressed during this visit. Due to the added complexity in care, I will continue to support Mikala in the subsequent management and with ongoing continuity of care.      Jamison Wallace MD      Again, thank you for allowing me to participate in the care of your patient.        Sincerely,        Jamison Wallace MD

## 2024-07-05 NOTE — NURSING NOTE
Current patient location: [address] [place] [address] N  [place] [address]    Is the patient currently in the state of [place]? YES    Visit mode:VIDEO    If the visit is dropped, the patient can be reconnected by: VIDEO VISIT: Send to e-mail at: [email or web address]    Will anyone else be joining the visit? NO  (If patient encounters technical issues they should call [phone]    How would you like to obtain your AVS? MyChart    Are changes needed to the allergy or medication list? No    Are refills needed on medications prescribed by this physician? NO    Reason for visit: RECHVIVIEN JARQUIN

## 2024-07-08 ENCOUNTER — TELEPHONE (OUTPATIENT)
Dept: OTOLARYNGOLOGY | Facility: CLINIC | Age: 84
End: 2024-07-08
Payer: MEDICARE

## 2024-07-08 NOTE — TELEPHONE ENCOUNTER
[facility] Call Center    Phone Message    May a detailed message be left on voicemail: yes     Reason for Call: Other: Pt would like to discuss a virtual appt for surgery consult, she lives in [place] and has trouble finding transportation.  Please call pt to discuss.  [facility] location, thanks     Action Taken: Other: ENT    Travel Screening: Not Applicable     Date of Service:

## 2024-07-11 ENCOUNTER — VIRTUAL VISIT (OUTPATIENT)
Dept: SURGERY | Facility: CLINIC | Age: 84
End: 2024-07-11
Payer: MEDICARE

## 2024-07-11 DIAGNOSIS — E04.1 THYROID NODULE: ICD-10-CM

## 2024-07-11 PROCEDURE — 99203 OFFICE O/P NEW LOW 30 MIN: CPT | Mod: 95 | Performed by: SURGERY

## 2024-07-11 NOTE — PROGRESS NOTES
Virtual Visit Details    Type of service:  Video Visit   Video Start Time:  9:10am  Video End Time: 9:30am    Originating Location (pt. Location): Home    Distant Location (provider location):  On-site  Platform used for Video Visit: Rochelle Bunch CMA

## 2024-07-11 NOTE — LETTER
7/11/2024      Jody Ch  [address]  [facility] [address]  [place] [address]      Dear Colleague,    Thank you for referring your patient, Jody Ch, to the [facility]. Please see a copy of my visit note below.    Virtual Visit Details    Type of service:  Video Visit   Video Start Time:  9:10am  Video End Time: 9:30am    Originating Location (pt. Location): Home    Distant Location (provider location):  On-site  Platform used for Video Visit: Rochelle Bunch, [facility]      SURGERY CLINIC CONSULTATION    REASON FOR CONSULTATION:  Jody Ch was referred by Dr. Castellano for evaluation and discussion of treatment options for thyroid nodule     HISTORY OF PRESENT ILLNESS:  Jody Ch is a 83 year old female who was noted to have a thyroid nodule in 2023.  Ultrasound confirmed a 4.7 cm thyroid nodule.  Biopsy in December 2023 was AUS.  Afirma was read as suspicious but no mutations or fusions identified.  Repeat ultrasound performed 4 months later noted that the nodule had increased in size now measuring 5.1 cm repeat biopsy AUS.  Thyroid function tests are within normal limits.    Symptoms associated with this thyroid nodule she admits to some intermittent hoarseness but occurs maybe once a week.  She can feel fullness with swallowing.  She has had a swallow evaluation that has been negative.  No problems with inspiration.  She has no previous head neck radiation treatment but does have a history of breast cancer that has been treated in remission greater than 5 years.  Has a brother who had a thyroidectomy for goiter.  No previous history of thyroid cancer      REVIEW OF SYSTEMS:  ROS EXAM: 10 point view of systems is pertinent for that known HPI  Patient Active Problem List   Diagnosis     ACP (advance care planning)     Atrophic vaginitis     Bilateral leg paresthesia     Chronic cough     Chronic diarrhea     Chronic rhinitis     Dry mouth     Gallbladder polyp      History of left breast cancer     Hypercholesteremia     IC (interstitial cystitis)     Ileitis     Impaired fasting glucose     Osteopenia     Peripheral axonal neuropathy     Pharyngeal dysphagia     SOB (shortness of breath)     Tremor, essential     Vitamin D insufficiency     Vitreous degeneration     Post-mastectomy lymphedema syndrome     Contracture of right shoulder     S/P bilateral cataract extraction     Recurrent aspiration pneumonia (H)     Oral phase dysphagia     ADAN (generalized anxiety disorder)     Diverticulitis of intestine, part unspecified, without perforation or abscess without bleeding     GERD (gastroesophageal reflux disease)     Osteoporosis, senile     Vitamin B12 deficiency     Acute midline low back pain without sciatica     Atherosclerotic vascular disease     Colloid thyroid nodule     Estrogen receptor negative status (ER-)     Recurrent UTI     Gastroesophageal reflux disease     History of malignant neoplasm of breast     Major depression, recurrent, full remission (H24)     Mixed stress and urge urinary incontinence     Pulmonary emphysema (H)     Former smoker     Thyroid nodule       Past Surgical History:   Procedure Laterality Date     ABDOMEN SURGERY       APPENDECTOMY       BLEPHAROPLASTY Bilateral 11/07/2013     EXCISE NODULE THYROID Left 7/24/2024    Procedure: Left thyroid lobectomy and isthmusectomy, Vascular Port Removal;  Surgeon: Eryn Jose MD;  Location: [facility] OR     FINGER GANGLION CYST EXCISION Right 06/23/2016    right ring finger     LUMPECTOMY BREAST  11/21/2006    Infiltrating ductal carcinoma, micropapillary variant, Janey     MASTECTOMY MODIFIED RADICAL Left 12/06/2006     OTHER SURGICAL HISTORY Right 11/07/2013    CT REMOVE EYELID LESN (NOT CHALAZION)     OTHER SURGICAL HISTORY Right 05/18/2018    US BIOPSY BREAST NEEDLE W BIBIANA W GUIDE RIGHT     OTHER SURGICAL HISTORY Right 06/01/2018    RIGHT SIMPLE MASTECTOMY WITH RIGHT SENTINAL LYMPH NODE  PVZQ4OV AND RIGHT AXILLARY NODE DISSECTION     REMOVE PORT VASCULAR ACCESS Left 7/24/2024    Procedure: Vascular Port Removal  (Left);  Surgeon: Eryn Jose MD;  Location: [facility] OR       Allergies   Allergen Reactions     Cephalexin Shortness Of Breath     Bladder burning     Amitriptyline Unknown     Gabapentin Swelling     Shortness of breath     Latex Swelling and Other (See Comments)     Sulfa Antibiotics Unknown     Tetracyclines & Related Unknown       Medications reviewed in the EMR        Family History   Problem Relation Age of Onset     Diabetes Brother      Hypertension Brother      Thyroid Disease Brother         Surgery     Prostate Cancer Father      Kidney Disease Father         One kidney does not function     Seizure Disorder Father      Cancer Maternal Grandmother         Bladder     Arthritis Maternal Grandmother      Hypertension Maternal Grandmother      Osteoporosis Maternal Grandmother      Alcoholism Mother      Substance Abuse Mother      Ulcers Mother      Hypertension Mother      Diabetes Brother      Hypertension Brother      Allergic rhinitis Sister      Asthma Sister      Breast Cancer Maternal Cousin         DCIS     I personally reviewed the radiographic images and laboratory data    ASSESSMENT:   1. Thyroid nodule        PLAN:   I recommend a left thyroid lobectomy and isthmusectomy.  The surgical procedure was discussed with the patient including but not limited to the risks of bleeding infection injury to the recurrent laryngeal nerve or nerves potential permanent hypocalcemia or loss of airway.  The patient would like to proceed with surgery.    She did have a question that she has a left Mediport placed over her anterior chest.  She has not used it in greater than 5 years and did request that the port be removed.    The patient's daughter was present during this video visit    Eryn Jose MD              Again, thank you for allowing me to participate in the care of  your patient.        Sincerely,        Eryn Jose MD

## 2024-07-12 ENCOUNTER — TELEPHONE (OUTPATIENT)
Dept: OTOLARYNGOLOGY | Facility: CLINIC | Age: 84
End: 2024-07-12
Payer: MEDICARE

## 2024-07-12 ENCOUNTER — PATIENT OUTREACH (OUTPATIENT)
Dept: CARE COORDINATION | Facility: CLINIC | Age: 84
End: 2024-07-12
Payer: MEDICARE

## 2024-07-12 NOTE — PROGRESS NOTES
Clinic Care Coordination Contact  Community Health Worker Follow Up    Care Gaps:     Health Maintenance Due   Topic Date Due    RSV VACCINE (Pregnancy & 60+) (1 - 1-dose 60+ series) Never done    MAMMO SCREENING  05/11/2018    COVID-19 Vaccine (7 - 2023-24 season) 02/24/2024    PHQ-9  04/13/2024       Scheduled 10/24/24 with Dr. Richard      Care Plan:   Care Plan: Transportation       Problem: Lack of transportation       Goal: I have accomplished establishing reliable transportation  Completed 7/12/2024      Start Date: 4/12/2024    This Visit's Progress: 100% Recent Progress: 30%    Note:     Personal Plan    My brother will be giving me a ride to and from my surgery.                               Intervention and Education during outreach: Patient let me know that her brother will be giving patient a ride to and from her surgery. No other needs at this time.     CHW Plan: Patient has accomplished goals and has no other goals that this patient would like to work on with Clinic Care Coordination. Community Health Worker sent request to [facility] RN pool to review for Maintenance.    Megan Lai  Community Health Worker  [facility]  Clinic Care Coordination   [place], [facility], [place], [place], [place] and [facility]  Office: [phone]

## 2024-07-12 NOTE — TELEPHONE ENCOUNTER
Patient left voicemail to schedule surgery with Dr. Jose. Forwarding message to surgeon to place surgery orders. Will return phone call after orders are placed.    Angelika Malone on 7/12/2024 at 3:03 PM

## 2024-07-15 ENCOUNTER — PATIENT OUTREACH (OUTPATIENT)
Dept: CARE COORDINATION | Facility: CLINIC | Age: 84
End: 2024-07-15
Payer: MEDICARE

## 2024-07-15 NOTE — PROGRESS NOTES
Clinic Care Coordination Contact    Situation-Received a request from CHW for patient to transition to [facility] Maintenance.    Background- Patient enrolled into [facility] 4/10/24.  Patient seeking assistance with transportation    Action-Chart review. All goals completed.  No new goals identified.  Will notify PCP.    Response: Patient to transition to Maintenance.  [facility] will continue to monitor chart and reach out to patient in 2 months to determine if Graduation is appropriate.

## 2024-07-17 ENCOUNTER — PREP FOR PROCEDURE (OUTPATIENT)
Dept: OTOLARYNGOLOGY | Facility: CLINIC | Age: 84
End: 2024-07-17
Payer: MEDICARE

## 2024-07-17 DIAGNOSIS — E04.1 THYROID NODULE: Primary | ICD-10-CM

## 2024-07-17 RX ORDER — DEXAMETHASONE SODIUM PHOSPHATE 4 MG/ML
10 INJECTION, SOLUTION INTRA-ARTICULAR; INTRALESIONAL; INTRAMUSCULAR; INTRAVENOUS; SOFT TISSUE ONCE
Status: CANCELLED | OUTPATIENT
Start: 2024-07-17 | End: 2024-07-17

## 2024-07-17 NOTE — TELEPHONE ENCOUNTER
Patient called in requested to schedule surgery with Dr. Jose. Patient was very erratic and angry that we cannot schedule surgery for her right now. Advised her that unfortunately we do not have surgery orders just yet and I will send Dr. Jose another message to get them placed ASAP. Patient states that she will not be able to have surgery if it is not scheduled for before school starts back up because her ride goes back to college. Advised her I can do my best but I cannot promise anything. Patient rwanted to make sure we know just how angry she is about this and to call her back the moment I get her surgery orders    Mary Alice Morris  7/17/2024 at 9:11 AM

## 2024-07-22 ENCOUNTER — PREP FOR PROCEDURE (OUTPATIENT)
Dept: SURGERY | Facility: CLINIC | Age: 84
End: 2024-07-22
Payer: MEDICARE

## 2024-07-22 ENCOUNTER — TELEPHONE (OUTPATIENT)
Dept: FAMILY MEDICINE | Facility: CLINIC | Age: 84
End: 2024-07-22
Payer: MEDICARE

## 2024-07-22 ENCOUNTER — TELEPHONE (OUTPATIENT)
Dept: OTOLARYNGOLOGY | Facility: CLINIC | Age: 84
End: 2024-07-22
Payer: MEDICARE

## 2024-07-22 PROBLEM — E04.1 THYROID NODULE: Status: ACTIVE | Noted: 2024-07-17

## 2024-07-22 ASSESSMENT — PATIENT HEALTH QUESTIONNAIRE - PHQ9
SUM OF ALL RESPONSES TO PHQ QUESTIONS 1-9: 1
SUM OF ALL RESPONSES TO PHQ QUESTIONS 1-9: 1
10. IF YOU CHECKED OFF ANY PROBLEMS, HOW DIFFICULT HAVE THESE PROBLEMS MADE IT FOR YOU TO DO YOUR WORK, TAKE CARE OF THINGS AT HOME, OR GET ALONG WITH OTHER PEOPLE: NOT DIFFICULT AT ALL

## 2024-07-22 NOTE — TELEPHONE ENCOUNTER
Patient left  regarding on 7/19/2024 regarding scheduling surgery with Dr. Jose. States she has had a referral since January.     Angelika Malone on 7/22/2024 at 8:08 AM

## 2024-07-22 NOTE — TELEPHONE ENCOUNTER
Spoke to pt regarding upcoming surgery. Cancelled PAC - as patient seeing PCP. Asnwered questions regarding arrival time and instructions for patient, and how/when she would get this information. Pt wanting port removal at the time of surgery. States that Dr. Jose told her taht she would remove it. Pt scheduled for port removal. Patient knows pre-op RN from the surgery center will call. Arrival time is 5:45 a.m. for surgery at 7:15 a.m. informed patient highly unlikely this would change. Patient should await phone call again.      Angelika Malone on 7/22/2024 at 1:41 PM

## 2024-07-22 NOTE — TELEPHONE ENCOUNTER
FUTURE VISIT INFORMATION      SURGERY INFORMATION:  Date: 7/24/24  [facility]  Surgeon:  Eryn Jose MD   Anesthesia Type:  general  Procedure: Left thyroid lobectomy and isthmusectomy   Consult: ov 7/11/24    RECORDS REQUESTED FROM:       Primary Care Provider: [facility]

## 2024-07-22 NOTE — TELEPHONE ENCOUNTER
Called patient to schedule surgery with Dr. Jose. Offered patient surgery on Wednesday, due to cancellation. Spoke with patient regarding her request for surgery before summer. Indicated that Dr. Jose is going out on a leave of absence and so she will not be operating past Friday. Discussed at great length the need for a pre-op H&P. Scheduled patient in PAC for pre-op tomorrow. Pt worried about labs. Informed patient Dr. Jose has not ordered any pre-op labs. That would be decided by PCP or PAC clinic. Pt would like to call PCP office for pre-op to see if they can get her in. She zaira contact her . Told her likely early AM arrival time. She will contact her .    If she needs to contact the clinic to cancel pre-op she should call [phone]. Discussed the process of scheduling as patient had questions who Katina was and has been unable to reach. Discussed with the patient that Dr. Jose had no placed orders and this was the reason for the delay in contacting her for scheduling.  Reassured patient that should she have questions she can call back.     Date of Surgery: 7/24/2024    Approximate arrival time given:  Yes    Location of surgery: [facility]    Pre-Op H&P: PAC tomorrow, 7/22/2024     Post-Op Appt Date: 2 week post-op needed      Patient aware that pre-op RN will call 2-3 days prior to surgery with arrival time and instructions Yes if patient sees PAC she will find out tomorrow.     Packet sent out:  No       Additional Comments: All patients questions were answered and was instructed to review to call back with any questions or concerns.       Angelika Malone on 7/22/2024 at 11:54 AM

## 2024-07-23 ENCOUNTER — ANESTHESIA EVENT (OUTPATIENT)
Dept: SURGERY | Facility: AMBULATORY SURGERY CENTER | Age: 84
End: 2024-07-23
Payer: MEDICARE

## 2024-07-23 ENCOUNTER — OFFICE VISIT (OUTPATIENT)
Dept: FAMILY MEDICINE | Facility: CLINIC | Age: 84
End: 2024-07-23
Payer: MEDICARE

## 2024-07-23 ENCOUNTER — PRE VISIT (OUTPATIENT)
Dept: SURGERY | Facility: CLINIC | Age: 84
End: 2024-07-23

## 2024-07-23 VITALS
HEART RATE: 73 BPM | DIASTOLIC BLOOD PRESSURE: 70 MMHG | BODY MASS INDEX: 29.92 KG/M2 | HEIGHT: 61 IN | WEIGHT: 158.5 LBS | TEMPERATURE: 98.9 F | OXYGEN SATURATION: 93 % | SYSTOLIC BLOOD PRESSURE: 106 MMHG

## 2024-07-23 DIAGNOSIS — Z85.3 HISTORY OF MALIGNANT NEOPLASM OF BREAST: Chronic | ICD-10-CM

## 2024-07-23 DIAGNOSIS — M81.8 OTHER OSTEOPOROSIS WITHOUT CURRENT PATHOLOGICAL FRACTURE: ICD-10-CM

## 2024-07-23 DIAGNOSIS — J34.89 DRAINAGE FROM NOSE: ICD-10-CM

## 2024-07-23 DIAGNOSIS — E04.1 COLLOID THYROID NODULE: ICD-10-CM

## 2024-07-23 DIAGNOSIS — E04.1 THYROID NODULE: ICD-10-CM

## 2024-07-23 DIAGNOSIS — Z01.818 PREOP GENERAL PHYSICAL EXAM: Primary | ICD-10-CM

## 2024-07-23 PROBLEM — C50.911: Status: RESOLVED | Noted: 2018-05-16 | Resolved: 2024-07-23

## 2024-07-23 PROBLEM — Z17.1 MALIGNANT NEOPLASM OF UPPER-INNER QUADRANT OF RIGHT BREAST IN FEMALE, ESTROGEN RECEPTOR NEGATIVE (H): Status: RESOLVED | Noted: 2018-10-08 | Resolved: 2024-07-23

## 2024-07-23 PROBLEM — C50.211 MALIGNANT NEOPLASM OF UPPER-INNER QUADRANT OF RIGHT BREAST IN FEMALE, ESTROGEN RECEPTOR NEGATIVE (H): Status: RESOLVED | Noted: 2018-10-08 | Resolved: 2024-07-23

## 2024-07-23 PROBLEM — I26.99 OTHER PULMONARY EMBOLISM WITHOUT ACUTE COR PULMONALE (H): Status: RESOLVED | Noted: 2019-01-22 | Resolved: 2024-07-23

## 2024-07-23 LAB
ERYTHROCYTE [DISTWIDTH] IN BLOOD BY AUTOMATED COUNT: 14.6 % (ref 10–15)
HCT VFR BLD AUTO: 44.3 % (ref 35–47)
HGB BLD-MCNC: 14.3 G/DL (ref 11.7–15.7)
MCH RBC QN AUTO: 28.2 PG (ref 26.5–33)
MCHC RBC AUTO-ENTMCNC: 32.3 G/DL (ref 31.5–36.5)
MCV RBC AUTO: 87 FL (ref 78–100)
PLATELET # BLD AUTO: 259 10E3/UL (ref 150–450)
RBC # BLD AUTO: 5.07 10E6/UL (ref 3.8–5.2)
TSH SERPL DL<=0.005 MIU/L-ACNC: 1.58 UIU/ML (ref 0.3–4.2)
VIT D+METAB SERPL-MCNC: 72 NG/ML (ref 20–50)
WBC # BLD AUTO: 6.6 10E3/UL (ref 4–11)

## 2024-07-23 PROCEDURE — 85027 COMPLETE CBC AUTOMATED: CPT | Performed by: FAMILY MEDICINE

## 2024-07-23 PROCEDURE — G2211 COMPLEX E/M VISIT ADD ON: HCPCS | Performed by: FAMILY MEDICINE

## 2024-07-23 PROCEDURE — 36415 COLL VENOUS BLD VENIPUNCTURE: CPT | Performed by: FAMILY MEDICINE

## 2024-07-23 PROCEDURE — 82306 VITAMIN D 25 HYDROXY: CPT | Performed by: FAMILY MEDICINE

## 2024-07-23 PROCEDURE — 99214 OFFICE O/P EST MOD 30 MIN: CPT | Performed by: FAMILY MEDICINE

## 2024-07-23 PROCEDURE — 84443 ASSAY THYROID STIM HORMONE: CPT | Performed by: FAMILY MEDICINE

## 2024-07-23 RX ORDER — NALOXONE HYDROCHLORIDE 0.4 MG/ML
0.1 INJECTION, SOLUTION INTRAMUSCULAR; INTRAVENOUS; SUBCUTANEOUS
Status: CANCELLED | OUTPATIENT
Start: 2024-07-23

## 2024-07-23 RX ORDER — ONDANSETRON 4 MG/1
4 TABLET, ORALLY DISINTEGRATING ORAL EVERY 30 MIN PRN
Status: CANCELLED | OUTPATIENT
Start: 2024-07-23

## 2024-07-23 RX ORDER — IPRATROPIUM BROMIDE 42 UG/1
2 SPRAY, METERED NASAL 4 TIMES DAILY
Qty: 15 ML | Refills: 2 | Status: SHIPPED | OUTPATIENT
Start: 2024-07-23

## 2024-07-23 RX ORDER — FENTANYL CITRATE 50 UG/ML
25 INJECTION, SOLUTION INTRAMUSCULAR; INTRAVENOUS
Status: CANCELLED | OUTPATIENT
Start: 2024-07-23

## 2024-07-23 RX ORDER — OXYCODONE HYDROCHLORIDE 5 MG/1
10 TABLET ORAL
Status: CANCELLED | OUTPATIENT
Start: 2024-07-23

## 2024-07-23 RX ORDER — ONDANSETRON 2 MG/ML
4 INJECTION INTRAMUSCULAR; INTRAVENOUS EVERY 30 MIN PRN
Status: CANCELLED | OUTPATIENT
Start: 2024-07-23

## 2024-07-23 RX ORDER — DEXAMETHASONE SODIUM PHOSPHATE 10 MG/ML
4 INJECTION, SOLUTION INTRAMUSCULAR; INTRAVENOUS
Status: CANCELLED | OUTPATIENT
Start: 2024-07-23

## 2024-07-23 RX ORDER — OXYCODONE HYDROCHLORIDE 5 MG/1
5 TABLET ORAL
Status: CANCELLED | OUTPATIENT
Start: 2024-07-23

## 2024-07-23 ASSESSMENT — COPD QUESTIONNAIRES: COPD: 1

## 2024-07-23 NOTE — PROGRESS NOTES
Preoperative Evaluation  [facility]  [address] [address]  Phone: [phone]  Primary Provider: Talisha Richard MD  Pre-op Performing Provider: Amparo Masy MD  Jul 23, 2024 7/22/2024   Surgical Information   What procedure is being done? removing 1/2 of thyroid and chemo port   Facility or Hospital where procedure/surgery will be performed: [facility]   Who is doing the procedure / surgery? DR Ingram   Date of surgery / procedure: 07/24/2025   Time of surgery / procedure: estimated 7: 15 am   Where do you plan to recover after surgery? at home with family        Fax number for surgical facility: Note does not need to be faxed, will be available electronically in Epic.    Assessment & Plan     The proposed surgical procedure is considered INTERMEDIATE risk.    Preop general physical exam      Colloid thyroid nodule    - CBC with platelets; Future    Drainage from nose    - ipratropium (ATROVENT) 0.06 % nasal spray; Spray 2 sprays into both nostrils 4 times daily      Possible Sleep Apnea:            - No identified additional risk factors other than previously addressed    Antiplatelet or Anticoagulation Medication Instructions   - Patient is on no antiplatelet or anticoagulation medications.    Additional Medication Instructions  Take all scheduled medications on the day of surgery EXCEPT for modifications listed below:   - bisphosphonates (alendronate, ibandronate, risedronate): taken on 7/22   - Herbal medications and vitamins: DO NOT TAKE 14 days prior to surgery.    Recommendation  Approval given to proceed with proposed procedure, without further diagnostic evaluation.    Brittney Bach is a 83 year old, presenting for the following:  Pre-Op Exam          7/23/2024     8:42 AM   Additional Questions   Roomed by Aysha WHATLEY CMA   Accompanied by self     HPI related to upcoming procedure: thyroid nodule now changing         7/22/2024   Pre-Op  Questionnaire   Have you ever had a heart attack or stroke? No   Have you ever had surgery on your heart or blood vessels, such as a stent placement, a coronary artery bypass, or surgery on an artery in your head, neck, heart, or legs? No   Do you have chest pain with activity? No   Do you have a history of heart failure? No   Do you currently have a cold, bronchitis or symptoms of other infection? No   Do you have a cough, shortness of breath, or wheezing? No   Do you or anyone in your family have previous history of blood clots? No   Do you or does anyone in your family have a serious bleeding problem such as prolonged bleeding following surgeries or cuts? No   Have you ever had problems with anemia or been told to take iron pills? No   Have you had any abnormal blood loss such as black, tarry or bloody stools, or abnormal vaginal bleeding? No   Have you ever had a blood transfusion? No   Are you willing to have a blood transfusion if it is medically needed before, during, or after your surgery? Yes   Have you or any of your relatives ever had problems with anesthesia? No   Do you have sleep apnea, excessive snoring or daytime drowsiness? (!) UNKNOWN   Do you have any artifical heart valves or other implanted medical devices like a pacemaker, defibrillator, or continuous glucose monitor? No   Do you have artificial joints? No   Are you allergic to latex? (!) YES        Health Care Directive  Patient does not have a Health Care Directive or Living Will: Advance Directive received and scanned. Click on Code in the patient header to view.    Preoperative Review of    reviewed - no record of controlled substances prescribed.      Status of Chronic Conditions:  See problem list for active medical problems.  Problems all longstanding and stable, except as noted/documented.  See ROS for pertinent symptoms related to these conditions.    Patient Active Problem List    Diagnosis Date Noted    Thyroid nodule  07/17/2024     Priority: Medium    Estrogen receptor negative status (ER-) 10/13/2023     Priority: Medium    Gastroesophageal reflux disease 10/13/2023     Priority: Medium    GERD (gastroesophageal reflux disease) 06/25/2023     Priority: Medium    Acute midline low back pain without sciatica 04/07/2022     Priority: Medium    Diverticulitis of intestine, part unspecified, without perforation or abscess without bleeding 01/14/2022     Priority: Medium    Atherosclerotic vascular disease 10/25/2021     Priority: Medium    Osteoporosis, senile 09/10/2020     Priority: Medium     Formatting of this note is different from the original.  UPDATE:  XR DXA BONE DENSITY 2 SITES AXIAL 9/20/2020: Osteoporosis lumbar spine. Osteopenia proximal right and left femur. Rx alendronate (Fosamax) 70 mg once a week on 9/15/2020   XR DXA BONE DENSITY 2 SITES AXIAL 5/16/2018: Osteopenia. Lumbar Spine L1-L4 T-score -1.8 . Mean Bilateral Femoral Neck T-score -1.4 . FRAX Results: 10-year probability of major osteoporotic fracture is 10.8%, and of hip fracture is 2%, based on left femoral neck BMD. Basic bone health recommended.   XR DXA BONE DENSITY 2 SITES AXIAL 4-18-16: T score AP spine -2.1, Left femoral neck -1.1, R femoral neck -1.5. FRAX major osteoporotic fracture 11% and FRAX hip fracture score 2.0 %. Basic bone health recommended.   DEXA scan 4-14-14 T score -2.1 AP spine, -1.4 left femoral neck and -1.5 on right femoral neck. FRAX score not calculated. Bone density test stable. Recheck 4-2016.  DEXA scan 4-4-12 T score -2.1 AP spine, -1.3 left femoral neck and -1.2 on right femoral neck. FRAX major osteoporotic score 9.6% and FRXA hip fracture score 1.3%. Recheck 4-2014  DEXA scan 3-20-08 T score -2.2 spine, -1.0 left femoral neck, -1.2 right femoral neck. DEXA 3-2-10: T score -1.7 AP spine, -1.0 L femoral neck, -1.3 R femoral neck. Rx Fosamax until 2-2012 per oncologist. Followed-up oncologist 11-9-11. Discontinue Arimidex in   per patient request due to muscle ache. Discontinue Fosamax once current prescription is done as bone density should improve once off Arimidex. Vitamin D was in low end of normal range at 31.2 on on 12-1-11. Please increase vitamin D to 2000 units daily.      S/P bilateral cataract extraction 12/12/2019     Priority: Medium     LEFT CATARACT EXTRACTION WITH LENS IMPLANTon 12/5/2019.  RIGHT  CATARACT EXTRACTION WITH LENS IMPLANT on 12/12/2019.        Pulmonary emphysema (H) 10/08/2019     Priority: Medium     Last Assessment & Plan: Formatting of this note might be different from the original. Escalate to Trelegy Albuterol QID prn Keep vaccines up to date Offered rehab - worried about the commute and winter      Vitamin B12 deficiency 07/22/2019     Priority: Medium     Formatting of this note might be different from the original.   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 Overview: 20 pack years      Malignant neoplasm of upper-inner quadrant of right breast in female, 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) 10/08/2018     Priority: Medium    Colloid thyroid nodule 07/03/2018     Priority: Medium     Formatting of this note is different from the original. US FNA W IMAGING GUIDANCE 7/3/2018:  Final Diagnosis THYROID, LEFT, ULTRASOUND-GUIDED FINE NEEDLE ASPIRATION: 1. Benign colloid nodule    Repeat  FNA 12/2023 : AUS       Breast cancer, stage 2, right (H) 05/16/2018     Priority: Medium     Overview:   Added automatically from request for surgery [id number]        Chronic cough 02/12/2018     Priority: Medium     Overview:   XR CHEST 2 VIEWS PA AND LATERAL 12-4-17: Normal heart size and pulmonary   vascularity. Tiny granulomas with some fibrosis in the right lung base.   The left lung is clear. Slight deviation of the upper trachea from left to   right presumably due to thyroid enlargement stable. No change from   previous. No acute process is identified. No focal pulmonary infiltrates.        Ileitis 07/25/2017     Priority: Medium     Overview:   COLONOSCOPY DIAGNOSTIC 7-25-17: Aphthous ulcer of ileum. Diverticulosis of   colon without diverticulitis.Pathology Results: NEOTERMINAL ILEUM,    BIOPSY: Nonspecific chronic active ileitis. No dysplasia or malignancy.   COLON, RANDOM, BIOPSY: Normal colonic mucosa . Advised to follow-up with   [facility] regarding chronic active ileitis.        Chronic rhinitis 06/08/2017     Priority: Medium    Chronic diarrhea 05/12/2017     Priority: Medium     Overview:   UPDATE: COLONOSCOPY DIAGNOSTIC 7-25-17: Aphthous ulcer of ileum.   Diverticulosis of colon without diverticulitis.Pathology Results:   NEOTERMINAL ILEUM,  BIOPSY: Nonspecific chronic active ileitis. No   dysplasia or malignancy. COLON, RANDOM, BIOPSY: Normal colonic mucosa  STOOL TESTS on 5-12-17 for culture, Clostridium dificile toxin, WBC,   Giardia antigen, Campylobacter, Shiga toxin, Stool for ova and parasite   were negative          Major depression, recurrent, full remission (H24) 01/31/2017     Priority: Medium     Formatting of this note might be different from the original. Overview: Inpatient treatment for depression after divorce in 1983. Major depression diagnosed due to financial worries diagnosed 11-23-10 by oncologist, Louise Burns. Citalopram 20 mg daily started. Citalopram discontinued on 5-19-11  per patient request.      Dry mouth 05/24/2016     Priority: Medium    Hypercholesteremia 12/08/2014     Priority: Medium     Overview:   UPDATE: Rx atorvastatin (Lipitor) 1-22-15. She sent medical message on   3-18-15 requesting to take atorvastatin (Lipitor) 20 mg tablet every other   day. This would not work for atorvastatin (Lipitor) . I advised to take   half tablet (10 mg) daily rather than taking one tablet every other day.  ASCVD Risk  10 year risk 7.9 % calculated on 12/8/2014.    Recommendation moderate to high - intensity statin.         Bilateral leg paresthesia 05/13/2014     Priority: Medium     Overview:   US FRANCISCO W EXERCISE BILATERAL 5-14-14: IMPRESSION: RIGHT LOWER EXTREMITY:   FRANCISCO at rest is normal with a normal response to exercise. These findings   would not be consistent with symptoms of arterial claudication. LEFT LOWER   EXTREMITY: FRANCISCO at rest is normal with a normal response to exercise. These   findings would not be consistent with symptoms of arterial claudication.   US VENOUS LOWER EXTREMITY LEFT 5-14-14: Left leg veins are negative for   DVT. EMG 5-19-14: Borderline abnormal EMG due to low amplitude compound   motor action potential in the motor nerves with normal conduction   velocities. This could suggest early axonal polyneuropathy. Such changes   can be seen in patient's with prediabetes.        SOB (shortness of breath) 05/13/2014     Priority: Medium     Overview:   XR CHEST 2 VIEWS PA AND LATERAL 5-13-14: Impression: On the lateral view,   a small patchy opacity is again visualized and has a few linear markings.   This may be chronic, it is suggested on the prior exam slightly more   prominent but this is probably due to technique, could represent chronic   areas of subsegmental volume loss or previous consolidation. It is not   well visualized on the PA view. Overall heart size and pulmonary vascular   are normal. No pleural abnormalities. Stable appearance of somewhat    confluent markings in the right apex as well as superimposition with the   right 1st costochondral junction. No additional areas of significant   consolidation.        Atrophic vaginitis 05/08/2014     Priority: Medium     Overview:   UPDATE: Followed-up with Dr. Cassie Arteaga on 5-8-14. Noted that patient did   not want to pay for the estrogen cream. Impression female stress   incontinence and atrophic vaginitis.        Recurrent UTI 09/12/2013     Priority: Medium     Formatting of this note might be different from the original. UPDATE: Followed-up with Dr. Cassie Arteaga on 5-8-14. Noted that patient did not want to pay for the estrogen cream. Impression female stress incontinence and atrophic vaginitis. Exacerbation of her dysuria symptoms. She has not used her estrace vaginal cream due to cost noted 9-10-13. Urinalysis negative on 9-10-13. Urinalysis and urine microscopy showed some signs of urinary tract infection 9-12-13 . Prescription for ciprofloxacin 250 mg twice daily for 7 days sent to pharmacy. Followed-up with Dr. Bayron Seymour 11-5-13. Rx ciprofloxacin 500 mg twice daily (#30 tablets) so she can self-treat as needed for signs of UTI. Estrace not covered by insurance and so not refilled. Consider Premarin cream. Recheck Urinalysis and urine microscopy showed some signs of urinary tract infection 9-12-13 . Prescription for ciprofloxacin 250 mg twice daily for 7 days sent to pharmacy.      Vitamin D insufficiency 12/03/2012     Priority: Medium     Overview:   Vitamin D was low at 28.1 on 12-3-12. Take vitamin D 3000 units daily for   8 weeks, then take vitamin D 2000 units indefinitely. Vitamin D was normal   at 40.8 on 3-6-13. Continue vitamin D 2000 units indefinitely.        Gallbladder polyp 04/14/2012     Priority: Medium     Overview:   UPDATE:US ABDOMEN LIMITED RUQ 4-18-16: 5 mm stone versus polyp in the   gallbladder, decreased in size since comparison study where measured 7 mm.   The gallbladder is  contracted despite being NPO, which limits evaluation.   Benign parapelvic cyst in the lower pole of the right kidney measuring 2.8   cm, is unchanged.  US ABDOMEN LIMITED RUQ on 2-3-14:  Stable appearance of a 8 mm cholesterol   polyp within the medial wall of the gallbladder. Incidentally noted is a   small 7 mm hyperechoic focus within the right hepatic lobe. This likely   reflects an incidental small cavernous hemangioma.  US ABDOMEN LIMITED 12-3-12: Stable appearance of a 7 mm cholesterol polyp   within the gallbladder.   CT ABD/PELVIS W/WO IV CONTRAST 3-23-12: Bilateral renal parapelvic cysts.   Urinary bladder appears normal. Atrophic uterus and right adnexa. Either   bilobed cyst or 2 separate cysts on atrophic left adnexa. Nodular foci in   gallbladder suggestive of stones or polyps. Recommend gallbladder   sonogram. US ABDOMEN LIMITED 4-6-12: Multiple hyperechoic nonmobile foci   in gallbladder. The largest measures 8 mm corresponding to CT scan dated   3-23-12. The appearance suggests gallbladder polyps, however non-mobile   gallstones may be among these foci. Suggest 6 month follow-up. US ABDOMEN   LIMITED 7-26-12: Stable appearance of the gallbladder with sonographic   findings indicative of cholesterolosis. The radiologist thinks the   gallbladder polyps are cholesterol polyps.        ACP (advance care planning) 11/22/2011     Priority: Medium     Overview:   UPDATED Health Care Directive 8-8-2013.  Health Care Directive updated 10-9-2001.        Peripheral axonal neuropathy 11/22/2011     Priority: Medium     Overview:   UPDATE:  She thinks gabapentin (Neurontin) has been helpful without side   effect of leg swelling discussed on 11-20-14. She agreed to increase dose   to 300 mg at bedtime.  She called on 10-30-14 requesting for gabapentin (Neurontin) as   nortriptyline not helpful. Reminded her that she complained of leg   swelling with gabapentin (Neurontin) in the past.   EMG 5-19-14: Borderline  abnormal EMG due to low amplitude compound motor   action potential in the motor nerves with normal conduction velocities.   This could suggest early axonal polyneuropathy. Such changes can be seen   in patient's with prediabetes.  Neurology follow-up on 4-2-13. Advised to give nortriptyline 25 mg daily   another try. If unable to tolerate, consider duloxetine (Cymbalta) .  Neurology consult Dr. Luther Armenta on 3-26-12. Impression essential   tremor. MRI brain. Neurontin 100 mg bid started to help tremors and   bilateral lower extremity paresthesia thought due to impaired FG or   prediabetes. Normal EMG of lower extremity on 4-11-12 but compound motor   action potentials low which can be seen in early axonal neuropathy.   Developed swelling with gabapentin (Neurontin) . MR SPINE LUMBAR WO   8-17-12: L5-S1 marked charley. facet arthrosis causing degenerative grade I L5   anterolisthesis but causing only mild up-down foraminal stenosis and no   compression of adjacent nerves. Tapering disc degeneration. No fracture,   neoplasm or infection. US ANKLE BRACHIAL INDEX  8-17-12: mild   athrosclerotic disease both lower extremities. Comprehensive lab tests   ordered 9-17-12 by Dr. Armenta regarding neuropathy. Rx changed to   nortriptyline which was increased to 50 mg daily at bedtime on 9-17-12.   Impression was likely axonal neuropathy due to prediabetes. Patient   discontinued nortriptyline due to constipation noted 11-26-12.         Tremor, essential 11/22/2011     Priority: Medium     Overview:   Neurology consult Dr. Luther Armenta on 3-26-12. Impression essential   tremor. MRI brain. Neurontin 100 mg bid started to help tremors and   bilateral lower extremity paresthesia thought due to impaired FG or   prediabetes. Normal EMG of lower extremity on 4-11-12 but compound motor   action potentials low which can be seen in early axonal neuropathy.   Developed swelling with gabapentin (Neurontin) . MR SPINE LUMBAR WITHOUT    8-17-12: L5-S1 marked charley. facet arthrosis causing degenerative grade I L5   anterolisthesis but causing only mild up-down foraminal stenosis and no   compression of adjacent nerves. Tapering disc degeneration. No fracture,   neoplasm or infection. US ANKLE BRACHIAL INDEX  8-17-12: mild   athrosclerotic disease both lower extremities. Comprehensive lab tests   ordered 9-17-12 by Dr. Armenta regarding neuropathy. Rx changed to   nortriptyline which was increased to 50 mg daily at bedtime on 9-17-12.   Impression was likely axonal neuropathy due to prediabetes.        IC (interstitial cystitis) 12/02/2010     Priority: Medium     Overview:   UPDATE: UPDATE: Followed-up with Dr. Cassie Arteaga on 5-8-14. Noted that   patient did not want to pay for the estrogen cream. Impression female   stress incontinence and atrophic vaginitis.  Exacerbation of her dysuria symptoms. She has not used her estrace vaginal   cream due to cost noted 9-10-13. Urinalysis negative on 9-10-13.   Urinalysis and urine microscopy showed some signs of urinary tract   infection 9-12-13 . Prescription for ciprofloxacin 250 mg twice daily for   7 days sent to pharmacy. Followed-up with Dr. Bayron Seymour 11-5-13. Rx   ciprofloxacin 500 mg twice daily (#30 tablets) so she can self-treat as   needed for signs of UTI. Estrace not covered by insurance and so not   refilled. Consider Premarin cream.  Previous biofeedback treatment for urge incontinence, urinary frequency   and dysuria. Followed-up [facility] since 12-2-10 for interstitial   cystitis. Work-up by urologist, Dr. Cassie Arteaga, since 2-27-12 regarding   urinary urge incontinence and chronic interstitial cystitis. Rx estrace   vaginal cream with good results at once a week. Stopped trimethoprim 100   mg daily noted 10-26-12.        Pharyngeal dysphagia 12/08/2009     Priority: Medium    Impaired fasting glucose 12/01/2006     Priority: Medium    History of left breast cancer 11/21/2006      Priority: Medium     Overview:   UPDATE: XR MAMMO UNI SCREEN FFDM RIGHT 4-14-14: ACR 1 Negative.   Followed-up with oncologist Dr. Phyllis Colon on 4-29-14. Stable.    XR MAMMO UNI SCREEN FFDM RIGHT: 4-4-12, 4-5-13: ACR 2 Benign Finding.   Followed-up with Dr. Phyllis Colon 5-23-13. CBC and CMP normal except   for low glucose of 55 . CA 27-29 normal.   Followed-up with Dr. Phyllis Colon 11-16-12. CBC and CMP normal except   for low glucose of 67 . CA 27-29 normal.  stage I infiltrating ductal carcinoma, s/p left radical mastectomy, T1c N0   M0, ER/ID (+), on Arimedex started 2-2007 and needed for 5 years (until   2-2012). Oncologist ADRIANA (Dr. Louise Burns). Follow-up oncologist   11-9-11. Discontinue Arimidex in  per patient request due to muscle   ache. Discontinue Fosamax once current prescription is done as bone   density should improve once off Arimidex. .        History of malignant neoplasm of breast 11/21/2006     Priority: Medium     Formatting of this note might be different from the original. Overview: UPDATE: XR MAMMO UNI SCREEN FFDM RIGHT 4-14-14: ACR 1 Negative. Followed-up with oncologist Dr. Phyllis Colon on 4-29-14. Stable.  XR MAMMO UNI SCREEN FFDM RIGHT: 4-4-12, 4-5-13: ACR 2 Benign Finding. Followed-up with Dr. Phyllis Colon 5-23-13. CBC and CMP normal except for low glucose of 55 . CA 27-29 normal.  Followed-up with Dr. Phyllis Colon 11-16-12. CBC and CMP normal except for low glucose of 67 . CA 27-29 normal. stage I infiltrating ductal carcinoma, s/p left radical mastectomy, T1c N0 M0, ER/ID (+), on Arimedex started 2-2007 and needed for 5 years (until 2-2012). Oncologist ADRIANA (Dr. Louise Burns). Follow-up oncologist 11-9-11. Discontinue Arimidex in  per patient request due to muscle ache. Discontinue Fosamax once current prescription is done as bone density should improve once off Arimidex. . Formatting of this note might be different from the original. UPDATE:  XR MAMMO UNI SCREEN FFDM RIGHT 4-14-14: ACR 1 Negative. Followed-up with oncologist Dr. Phyllis Colon on 4-29-14. Stable.  XR MAMMO UNI SCREEN FFDM RIGHT: 4-4-12, 4-5-13: ACR 2 Benign Finding. Followed-up with Dr. Phyllis Colon 5-23-13. CBC and CMP normal except for low glucose of 55 . CA 27-29 normal.  Followed-up with Dr. Phyllis Colon 11-16-12. CBC and CMP normal except for low glucose of 67 . CA 27-29 normal. stage I infiltrating ductal carcinoma, s/p left radical mastectomy, T1c N0 M0, ER/NC (+), on Arimedex started 2-2007 and needed for 5 years (until 2-2012). Oncologist ADRIANA (Dr. Louise Burns). Follow-up oncologist 11-9-11. Discontinue Arimidex in  per patient request due to muscle ache. Discontinue Fosamax once current prescription is done as bone density should improve once off Arimidex. .      Osteopenia 11/01/2006     Priority: Medium     Overview:   UPDATE:  XR DXA BONE DENSITY 2 SITES AXIAL 5/16/2018: Osteopenia. Lumbar   Spine L1-L4 T-score -1.8. Mean Bilateral Femoral Neck T-score -1.4. FRAX   Results: 10-year probability of major osteoporotic fracture is 10.8%, and   of hip fracture is 2%, based on left femoral neck BMD. Basic bone health   recommended.   XR DXA BONE DENSITY 2 SITES AXIAL 4-18-16: T score AP spine -2.1, Left   femoral neck -1.1, R femoral neck -1.5. FRAX major osteoporotic fracture   11% and FRAX hip fracture score 2.0 %. Basic bone health recommended.   DEXA scan 4-14-14 T score -2.1 AP spine, -1.4 left femoral neck and -1.5   on right femoral neck. FRAX score not calculated. Bone density test   stable. Recheck 4-2016.  DEXA scan 4-4-12 T score -2.1 AP spine, -1.3 left femoral neck and -1.2 on   right femoral neck. FRAX major osteoporotic score 9.6% and FRXA hip   fracture score 1.3%. Recheck 4-2014  DEXA scan 3-20-08 T score -2.2 spine, -1.0 left femoral neck, -1.2 right   femoral neck. DEXA 3-2-10: T score -1.7 AP spine, -1.0 L femoral neck,   -1.3 R femoral neck.  Rx Fosamax until 2-2012 per oncologist. Followed-up   oncologist 11-9-11. Discontinue Arimidex in  per patient request   due to muscle ache. Discontinue Fosamax once current prescription is done   as bone density should improve once off Arimidex. Vitamin D was in low end   of normal range at 31.2 on on 12-1-11. Please increase vitamin D to 2000   units daily.        Mixed stress and urge urinary incontinence 07/24/2006     Priority: Medium     Formatting of this note might be different from the original. Overview: UPDATE: Followed-up with Dr. Cassie Arteaga on 5-8-14. Noted that patient did not want to pay for the estrogen cream. Impression female stress incontinence and atrophic vaginitis. Exacerbation of her dysuria symptoms. She has not used her estrace vaginal cream due to cost noted 9-10-13. Urinalysis negative on 9-10-13. Urinalysis and urine microscopy showed some signs of urinary tract infection 9-12-13 . Prescription for ciprofloxacin 250 mg twice daily for 7 days sent to pharmacy. Followed-up with Dr. Bayron Seymour 11-5-13. Rx ciprofloxacin 500 mg twice daily (#30 tablets) so she can self-treat as needed for signs of UTI. Estrace not covered by insurance and so not refilled. Consider Premarin cream. Previous biofeedback treatment for urge incontinence, urinary frequency and dysuria. Followed-up [facility] since 12-2-10 for interstitial cystitis. Work-up by urologist, Dr. Cassie Arteaga, since 2-27-12 regarding urinary urge incontinence and chronic interstitial cystitis. Rx estrace vaginal cream with good results at once a week. Stopped trimethoprim 100 mg daily noted 10-26-12. Formatting of this note might be different from the original. UPDATE: Followed-up with Dr. Cassie Arteaga on 5-8-14. Noted that patient did not want to pay for the estrogen cream. Impression female stress incontinence and atrophic vaginitis. Exacerbation of her dysuria symptoms. She has not used her estrace vaginal cream due to cost  noted 9-10-13. Urinalysis negative on 9-10-13. Urinalysis and urine microscopy showed some signs of urinary tract infection 9-12-13 . Prescription for ciprofloxacin 250 mg twice daily for 7 days sent to pharmacy. Followed-up with Dr. Bayron Seymour 11-5-13. Rx ciprofloxacin 500 mg twice daily (#30 tablets) so she can self-treat as needed for signs of UTI. Estrace not covered by insurance and so not refilled. Consider Premarin cream. Previous biofeedback treatment for urge incontinence, urinary frequency and dysuria. Followed-up [facility] since 12-2-10 for interstitial cystitis. Work-up by urologist, Dr. Cassie Arteaga, since 2-27-12 regarding urinary urge incontinence and chronic interstitial cystitis. Rx estrace vaginal cream with good results at once a week. Stopped trimethoprim 100 mg daily noted 10-26-12.        Past Medical History:   Diagnosis Date    Anxiety state 01/31/2017    Arthritis     Atrophic vaginitis 05/08/2014    Overview:  UPDATE: Followed-up with Dr. Cassie Arteaga on 5-8-14. Noted that patient did not want to pay for the estrogen cream. Impression female stress incontinence and atrophic vaginitis.    Bilateral leg paresthesia 05/13/2014    Overview:  US FRANCISCO W EXERCISE BILATERAL 5-14-14: IMPRESSION: RIGHT LOWER EXTREMITY: FRANCISCO at rest is normal with a normal response to exercise. These findings would not be consistent with symptoms of arterial claudication. LEFT LOWER EXTREMITY: FRANCISCO at rest is normal with a normal response to exercise. These findings would not be consistent with symptoms of arterial claudication. US VENOUS LOWER EXTREM    Bone pain 07/24/2018    Breast cancer (H)     Breast cancer, stage 2, right (H) 05/16/2018    Overview:  Added automatically from request for surgery [id number]    Chronic cough 02/12/2018    Overview:  XR CHEST 2 VIEWS PA AND LATERAL 12-4-17: Normal heart size and pulmonary vascularity. Tiny granulomas with some fibrosis in the right lung base. The left lung is clear.  Slight deviation of the upper trachea from left to right presumably due to thyroid enlargement stable. No change from previous. No acute process is identified. No focal pulmonary infiltrates.    Chronic diarrhea 05/12/2017    Overview:  UPDATE: COLONOSCOPY DIAGNOSTIC 7-25-17: Aphthous ulcer of ileum. Diverticulosis of colon without diverticulitis. Pathology Results: NEOTERMINAL ILEUM,  BIOPSY: Nonspecific chronic active ileitis. No dysplasia or malignancy. COLON, RANDOM, BIOPSY: Normal colonic mucosa STOOL TESTS on 5-12-17 for culture, Clostridium dificile toxin, WBC, Giardia antigen, Campylobacter, Shiga toxin, Stool f    Chronic pain of right knee 10/23/2017    Overview:  XR KNEE 3 VIEWS RIGHT 10-23-17: Negative knee. No fracture or dislocation. No joint effusion.    Chronic rhinitis 06/08/2017    Chronic right hip pain 10/23/2017    Overview:  XR HIP 2 OR 3 VIEWS W PELVIS RIGHT 10-23-17: Arthritic change right hip. Pelvis otherwise negative.    Decreased range of motion of right shoulder 10/08/2018    Depressive disorder     Diarrhea 07/24/2018    Difficult or painful urination 12/08/2009    Overview:  UPDATE: Followed-up with Dr. Cassie Arteaga on 5-8-14. Noted that patient did not want to pay for the estrogen cream. Impression female stress incontinence and atrophic vaginitis. Exacerbation of her dysuria symptoms. She has not used her estrace vaginal cream due to cost noted 9-10-13. Urinalysis negative on 9-10-13. Urinalysis and urine microscopy showed some signs of urinary tract infecti    Drug-induced nausea and vomiting 07/17/2018    Dry mouth 05/24/2016    Gallbladder polyp 04/14/2012    Overview:  UPDATE:US ABDOMEN LIMITED RUQ 4-18-16: 5 mm stone versus polyp in the gallbladder, decreased in size since comparison study where measured 7 mm. The gallbladder is contracted despite being NPO, which limits evaluation. Benign parapelvic cyst in the lower pole of the right kidney measuring 2.8 cm, is unchanged. US  ABDOMEN LIMITED RUQ on 2-3-14:  Stable appearance of a 8 mm cholesterol tristin    Ganglion cyst of flexor tendon sheath of finger of right hand 05/24/2016    History of breast cancer 11/21/2006    Overview:  UPDATE: XR MAMMO UNI SCREEN FFDM RIGHT 4-14-14: ACR 1 Negative. Followed-up with oncologist Dr. Phyllis Colon on 4-29-14. Stable.    XR MAMMO UNI SCREEN FFDM RIGHT: 4-4-12, 4-5-13: ACR 2 Benign Finding. Followed-up with Dr. Phyllis Colon 5-23-13. CBC and CMP normal except for low glucose of 55 . CA 27-29 normal.   Followed-up with Dr. Phyllis Colon 11-16-12. CBC and CMP normal exce    Hypercholesteremia 12/08/2014    Overview:  UPDATE: Rx atorvastatin (Lipitor) 1-22-15. She sent medical message on 3-18-15 requesting to take atorvastatin (Lipitor) 20 mg tablet every other day. This would not work for atorvastatin (Lipitor) . I advised to take half tablet (10 mg) daily rather than taking one tablet every other day. ASCVD Risk  10 year risk 7.9 % calculated on 12/8/2014.  Recommendation moderate to high -    IC (interstitial cystitis) 12/02/2010    Overview:  UPDATE: UPDATE: Followed-up with Dr. Cassie Arteaga on 5-8-14. Noted that patient did not want to pay for the estrogen cream. Impression female stress incontinence and atrophic vaginitis. Exacerbation of her dysuria symptoms. She has not used her estrace vaginal cream due to cost noted 9-10-13. Urinalysis negative on 9-10-13. Urinalysis and urine microscopy showed some signs of urinary tract    Ileitis 07/25/2017    Overview:  COLONOSCOPY DIAGNOSTIC 7-25-17: Aphthous ulcer of ileum. Diverticulosis of colon without diverticulitis. Pathology Results: NEOTERMINAL ILEUM,  BIOPSY: Nonspecific chronic active ileitis. No dysplasia or malignancy. COLON, RANDOM, BIOPSY: Normal colonic mucosa . Advised to follow-up with [facility] regarding chronic active ileitis.    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    Left thyroid nodule 04/30/2018    Lymphedema of extremity 10/08/2018    Major depression, recurrent, full remission (H24) 01/31/2017    Overview:  Inpatient treatment for depression after divorce in 1983. Major depression diagnosed due to financial worries diagnosed 11-23-10 by oncologist, Louise Burns. Citalopram 20 mg daily started. Citalopram discontinued on 5-19-11 per patient request.    Malignant neoplasm of upper-inner quadrant of right breast in female, 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) 10/08/2018    Mixed stress and urge urinary incontinence 07/24/2006    Overview:  UPDATE: Followed-up with Dr. Cassie Arteaga on 5-8-14. Noted that patient did not want to pay for the estrogen cream. Impression female stress incontinence and atrophic vaginitis.  Exacerbation of her dysuria symptoms. She has not used her estrace vaginal cream due to cost noted 9-10-13. Urinalysis negative on 9-10-13. Urinalysis and urine microscopy showed some signs of urinary tract infect    Nasal congestion 06/05/2014    Osteopenia 11/01/2006    Overview:  UPDATE:  XR DXA BONE DENSITY 2 SITES AXIAL 5/16/2018: Osteopenia. Lumbar Spine L1-L4 T-score -1.8 . Mean Bilateral Femoral Neck T-score -1.4 . FRAX Results: 10-year probability of major osteoporotic fracture is 10.8%, and of hip fracture is 2%, based on left femoral neck BMD. Basic bone health recommended.  XR DXA BONE DENSITY 2 SITES AXIAL 4-18-16: T score AP spine -2.1, Left femoral ne    Other emphysema (H) 04/30/2018    Other specified disorders of nose and nasal sinuses 07/24/2018    Peripheral axonal neuropathy 11/22/2011    Overview:  UPDATE:  She thinks gabapentin (Neurontin) has been helpful without side effect of leg swelling discussed on 11-20-14. She agreed to increase dose to 300 mg at bedtime. She called on 10-30-14 requesting for gabapentin (Neurontin) as  nortriptyline not helpful. Reminded her that she complained of leg swelling with gabapentin (Neurontin) in the past.  EMG 5-19-14: Borderline abnormal EMG du    Personal history of tobacco use, presenting hazards to health 10/22/2018    Overview:  20 pack years    Pharyngeal dysphagia 12/08/2009    Recurrent UTI 09/12/2013    Overview:  UPDATE: Followed-up with Dr. Cassie Arteaga on 5-8-14. Noted that patient did not want to pay for the estrogen cream. Impression female stress incontinence and atrophic vaginitis.  Exacerbation of her dysuria symptoms. She has not used her estrace vaginal cream due to cost noted 9-10-13. Urinalysis negative on 9-10-13. Urinalysis and urine microscopy showed some signs of urinary tract infect    Salivation excessive 12/17/2008    Overview:  may be due to postnasal drip causing excessive salivation as she tends to swallow the postnasal drainage.She is not candidate for tricyclic antidepressant like nortriptyline (can cause dry mouth) to lessen salivary secretions as this would excerbate her dry lips.    SOB (shortness of breath) 05/13/2014    Overview:  XR CHEST 2 VIEWS PA AND LATERAL 5-13-14: Impression: On the lateral view, a small patchy opacity is again visualized and has a few linear markings. This may be chronic, it is suggested on the prior exam slightly more prominent but this is probably due to technique, could represent chronic areas of subsegmental volume loss or previous consolidation. It is not well visualized on the PA vie    Tremor, essential 11/22/2011    Overview:  Neurology consult Dr. Luther Armenta on 3-26-12. Impression essential tremor. MRI brain. Neurontin 100 mg bid started to help tremors and bilateral lower extremity paresthesia thought due to impaired FG or prediabetes. Normal EMG of lower extremity on 4-11-12 but compound motor action potentials low which can be seen in early axonal neuropathy. Developed swelling with gabapentin (Neurontin    Vitamin D  insufficiency 12/03/2012    Overview:  Vitamin D was low at 28.1 on 12-3-12. Take vitamin D 3000 units daily for 8 weeks, then take vitamin D 2000 units indefinitely. Vitamin D was normal at 40.8 on 3-6-13. Continue vitamin D 2000 units indefinitely.    Vitreous degeneration 10/22/2018    Overview:  Right eye     Past Surgical History:   Procedure Laterality Date    ABDOMEN SURGERY      APPENDECTOMY      BLEPHAROPLASTY Bilateral 11/07/2013    FINGER GANGLION CYST EXCISION Right 06/23/2016    right ring finger    LUMPECTOMY BREAST  11/21/2006    Infiltrating ductal carcinoma, micropapillary variant, Janey    MASTECTOMY MODIFIED RADICAL Left 12/06/2006    OTHER SURGICAL HISTORY Right 11/07/2013    AR REMOVE EYELID LESN (NOT CHALAZION)    OTHER SURGICAL HISTORY Right 05/18/2018    US BIOPSY BREAST NEEDLE W BIBIANA W GUIDE RIGHT    OTHER SURGICAL HISTORY Right 06/01/2018    RIGHT SIMPLE MASTECTOMY WITH RIGHT SENTINAL LYMPH NODE [id number] AND RIGHT AXILLARY NODE DISSECTION     Current Outpatient Medications   Medication Sig Dispense Refill    alendronate (FOSAMAX) 70 MG tablet Take 1 tablet (70 mg) by mouth every 7 days 12 tablet 3    calcium 600 MG tablet Take 1 tablet (600 mg) by mouth 2 times daily 180 tablet 3    Cholecalciferol (VITAMIN D3 PO) Take 2,000 Units by mouth daily      Cyanocobalamin (B-12) 1000 MCG TBCR Take 1,000 mcg by mouth daily      escitalopram (LEXAPRO) 10 MG tablet Take 1 tablet (10 mg) by mouth daily 90 tablet 3    Fluticasone-Umeclidin-Vilant (TRELEGY ELLIPTA) 100-62.5-25 MCG/ACT oral inhaler Inhale 1 puff into the lungs daily      omeprazole (PRILOSEC) 20 MG DR capsule Take 1 capsule (20 mg) by mouth daily 90 capsule 3    albuterol (ACCUNEB) 1.25 MG/3ML neb solution Take 1.25 mg by nebulization 2 times daily as needed for shortness of breath, wheezing or cough (Patient not taking: Reported on 5/20/2024)         Allergies   Allergen Reactions    Cephalexin Shortness Of Breath     Bladder  "burning    Amitriptyline Unknown    Gabapentin Swelling     Shortness of breath    Latex Swelling and Other (See Comments)    Sulfa Antibiotics Unknown    Tetracyclines & Related Unknown        Social History     Tobacco Use    Smoking status: Former     Current packs/day: 0.00     Average packs/day: 0.5 packs/day for 40.0 years (20.0 ttl pk-yrs)     Types: Cigarettes     Start date: 1965     Quit date: 2005     Years since quittin.4    Smokeless tobacco: Never   Substance Use Topics    Alcohol use: No     Comment: Alcoholic Drinks/day: occassionaly        History   Drug Use No             Review of Systems  Constitutional, HEENT, cardiovascular, pulmonary, gi and gu systems are negative, except as otherwise noted.    Objective    /70   Pulse 73   Temp 98.9  F (37.2  C) (Tympanic)   Ht 1.555 m (5' 1.22\")   Wt 71.9 kg (158 lb 8 oz)   SpO2 93%   BMI 29.73 kg/m     Estimated body mass index is 29.73 kg/m  as calculated from the following:    Height as of this encounter: 1.555 m (5' 1.22\").    Weight as of this encounter: 71.9 kg (158 lb 8 oz).  Physical Exam  GENERAL: alert and no distress  HENT: ear canals and TM's normal, nose and mouth without ulcers or lesions  NECK: no adenopathy, no asymmetry, masses, or scars  RESP: lungs clear to auscultation - no rales, rhonchi or wheezes  CV: regular rate and rhythm, normal S1 S2, no S3 or S4, no murmur, click or rub, no peripheral edema   MS: no gross musculoskeletal defects noted, no edema  SKIN: no suspicious lesions or rashes  NEURO: Normal strength and tone, sensory exam grossly normal, mentation intact, and resting tremor    Recent Labs   Lab Test 23  0914   A1C 5.5      Results for orders placed or performed in visit on 24   CBC with platelets     Status: Normal   Result Value Ref Range    WBC Count 6.6 4.0 - 11.0 10e3/uL    RBC Count 5.07 3.80 - 5.20 10e6/uL    Hemoglobin 14.3 11.7 - 15.7 g/dL    Hematocrit 44.3 35.0 - 47.0 %    " MCV 87 78 - 100 fL    MCH 28.2 26.5 - 33.0 pg    MCHC 32.3 31.5 - 36.5 g/dL    RDW 14.6 10.0 - 15.0 %    Platelet Count 259 150 - 450 10e3/uL       Diagnostics  Labs pending at this time.  Results will be reviewed when available.   No EKG required, no history of coronary heart disease, significant arrhythmia, peripheral arterial disease or other structural heart disease.    Revised Cardiac Risk Index (RCRI)  The patient has the following serious cardiovascular risks for perioperative complications:   - No serious cardiac risks = 0 points     RCRI Interpretation: 0 points: Class I (very low risk - 0.4% complication rate)         Signed Electronically by: Amparo Mays MD  A copy of this evaluation report is provided to the requesting physician.         Answers submitted by the patient for this visit:  Patient Health Questionnaire (Submitted on 7/22/2024)  If you checked off any problems, how difficult have these problems made it for you to do your work, take care of things at home, or get along with other people?: Not difficult at all  PHQ9 TOTAL SCORE: 1

## 2024-07-23 NOTE — ANESTHESIA PREPROCEDURE EVALUATION
Anesthesia Pre-Procedure Evaluation    Patient: Jody Ch   MRN: [id number] : 1940        Procedure : Procedure(s):  Left thyroid lobectomy and isthmusectomy, Vascular Port Removal  Vascular Port Removal  (Left)          Past Medical History:   Diagnosis Date    Anxiety state 2017    Arthritis     Atrophic vaginitis 2014    Overview:  UPDATE: Followed-up with Dr. Cassie Arteaga on 14. Noted that patient did not want to pay for the estrogen cream. Impression female stress incontinence and atrophic vaginitis.    Bilateral leg paresthesia 2014    Overview:  US FRANCISCO W EXERCISE BILATERAL 14: IMPRESSION: RIGHT LOWER EXTREMITY: FRANCISCO at rest is normal with a normal response to exercise. These findings would not be consistent with symptoms of arterial claudication. LEFT LOWER EXTREMITY: FRANCISCO at rest is normal with a normal response to exercise. These findings would not be consistent with symptoms of arterial claudication. US VENOUS LOWER EXTREM    Bone pain 2018    Breast cancer (H)     Breast cancer, stage 2, right (H) 2018    Overview:  Added automatically from request for surgery [id number]    Chronic cough 2018    Overview:  XR CHEST 2 VIEWS PA AND LATERAL 17: Normal heart size and pulmonary vascularity. Tiny granulomas with some fibrosis in the right lung base. The left lung is clear. Slight deviation of the upper trachea from left to right presumably due to thyroid enlargement stable. No change from previous. No acute process is identified. No focal pulmonary infiltrates.    Chronic diarrhea 2017    Overview:  UPDATE: COLONOSCOPY DIAGNOSTIC 17: Aphthous ulcer of ileum. Diverticulosis of colon without diverticulitis. Pathology Results: NEOTERMINAL ILEUM,  BIOPSY: Nonspecific chronic active ileitis. No dysplasia or malignancy. COLON, RANDOM, BIOPSY: Normal colonic mucosa STOOL TESTS on 17 for culture, Clostridium dificile toxin, WBC, Giardia antigen,  Campylobacter, Shiga toxin, Stool f    Chronic pain of right knee 10/23/2017    Overview:  XR KNEE 3 VIEWS RIGHT 10-23-17: Negative knee. No fracture or dislocation. No joint effusion.    Chronic rhinitis 06/08/2017    Chronic right hip pain 10/23/2017    Overview:  XR HIP 2 OR 3 VIEWS W PELVIS RIGHT 10-23-17: Arthritic change right hip. Pelvis otherwise negative.    Decreased range of motion of right shoulder 10/08/2018    Depressive disorder     Diarrhea 07/24/2018    Difficult or painful urination 12/08/2009    Overview:  UPDATE: Followed-up with Dr. Cassie Arteaga on 5-8-14. Noted that patient did not want to pay for the estrogen cream. Impression female stress incontinence and atrophic vaginitis. Exacerbation of her dysuria symptoms. She has not used her estrace vaginal cream due to cost noted 9-10-13. Urinalysis negative on 9-10-13. Urinalysis and urine microscopy showed some signs of urinary tract infecti    Drug-induced nausea and vomiting 07/17/2018    Dry mouth 05/24/2016    Gallbladder polyp 04/14/2012    Overview:  UPDATE:US ABDOMEN LIMITED RUQ 4-18-16: 5 mm stone versus polyp in the gallbladder, decreased in size since comparison study where measured 7 mm. The gallbladder is contracted despite being NPO, which limits evaluation. Benign parapelvic cyst in the lower pole of the right kidney measuring 2.8 cm, is unchanged. US ABDOMEN LIMITED RUQ on 2-3-14:  Stable appearance of a 8 mm cholesterol tristin    Ganglion cyst of flexor tendon sheath of finger of right hand 05/24/2016    History of breast cancer 11/21/2006    Overview:  UPDATE: XR MAMMO UNI SCREEN FFDM RIGHT 4-14-14: ACR 1 Negative. Followed-up with oncologist Dr. Phyllis Colon on 4-29-14. Stable.    XR MAMMO UNI SCREEN FFDM RIGHT: 4-4-12, 4-5-13: ACR 2 Benign Finding. Followed-up with Dr. Phyllis Colon 5-23-13. CBC and CMP normal except for low glucose of 55 . CA 27-29 normal.   Followed-up with Dr. Phyllis Colon 11-16-12. CBC and CMP normal exce     Hypercholesteremia 12/08/2014    Overview:  UPDATE: Rx atorvastatin (Lipitor) 1-22-15. She sent medical message on 3-18-15 requesting to take atorvastatin (Lipitor) 20 mg tablet every other day. This would not work for atorvastatin (Lipitor) . I advised to take half tablet (10 mg) daily rather than taking one tablet every other day. ASCVD Risk  10 year risk 7.9 % calculated on 12/8/2014.  Recommendation moderate to high -    IC (interstitial cystitis) 12/02/2010    Overview:  UPDATE: UPDATE: Followed-up with Dr. Cassie Arteaga on 5-8-14. Noted that patient did not want to pay for the estrogen cream. Impression female stress incontinence and atrophic vaginitis. Exacerbation of her dysuria symptoms. She has not used her estrace vaginal cream due to cost noted 9-10-13. Urinalysis negative on 9-10-13. Urinalysis and urine microscopy showed some signs of urinary tract    Ileitis 07/25/2017    Overview:  COLONOSCOPY DIAGNOSTIC 7-25-17: Aphthous ulcer of ileum. Diverticulosis of colon without diverticulitis. Pathology Results: NEOTERMINAL ILEUM,  BIOPSY: Nonspecific chronic active ileitis. No dysplasia or malignancy. COLON, RANDOM, BIOPSY: Normal colonic mucosa . Advised to follow-up with [facility] regarding chronic active ileitis.    Impaired fasting glucose 12/01/2006    Insomnia secondary to depression with anxiety 01/31/2017    Left leg swelling 05/13/2014    Overview:  US VENOUS LOWER EXTREMITY LEFT 5-14-14: Left leg veins are negative for DVT.    Left thyroid nodule 04/30/2018    Lymphedema of extremity 10/08/2018    Major depression, recurrent, full remission (H24) 01/31/2017    Overview:  Inpatient treatment for depression after divorce in 1983. Major depression diagnosed due to financial worries diagnosed 11-23-10 by oncologist, Louise Burns. Citalopram 20 mg daily started. Citalopram discontinued on 5-19-11 per patient request.    Malignant neoplasm of upper-inner quadrant of right breast in female,  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) 10/08/2018    Mixed stress and urge urinary incontinence 07/24/2006    Overview:  UPDATE: Followed-up with Dr. Cassie Arteaga on 5-8-14. Noted that patient did not want to pay for the estrogen cream. Impression female stress incontinence and atrophic vaginitis.  Exacerbation of her dysuria symptoms. She has not used her estrace vaginal cream due to cost noted 9-10-13. Urinalysis negative on 9-10-13. Urinalysis and urine microscopy showed some signs of urinary tract infect    Nasal congestion 06/05/2014    Osteopenia 11/01/2006    Overview:  UPDATE:  XR DXA BONE DENSITY 2 SITES AXIAL 5/16/2018: Osteopenia. Lumbar Spine L1-L4 T-score -1.8 . Mean Bilateral Femoral Neck T-score -1.4 . FRAX Results: 10-year probability of major osteoporotic fracture is 10.8%, and of hip fracture is 2%, based on left femoral neck BMD. Basic bone health recommended.  XR DXA BONE DENSITY 2 SITES AXIAL 4-18-16: T score AP spine -2.1, Left femoral ne    Other emphysema (H) 04/30/2018    Other specified disorders of nose and nasal sinuses 07/24/2018    Peripheral axonal neuropathy 11/22/2011    Overview:  UPDATE:  She thinks gabapentin (Neurontin) has been helpful without side effect of leg swelling discussed on 11-20-14. She agreed to increase dose to 300 mg at bedtime. She called on 10-30-14 requesting for gabapentin (Neurontin) as nortriptyline not helpful. Reminded her that she complained of leg swelling with gabapentin (Neurontin) in the past.  EMG 5-19-14: Borderline abnormal EMG du    Personal history of tobacco use, presenting hazards to health 10/22/2018    Overview:  20 pack years    Pharyngeal dysphagia 12/08/2009    Recurrent UTI 09/12/2013    Overview:  UPDATE: Followed-up with Dr. Cassie Arteaga on 5-8-14. Noted that patient did not want to pay for the estrogen cream. Impression female stress incontinence and atrophic vaginitis.  Exacerbation of her dysuria symptoms. She has not used her estrace vaginal  cream due to cost noted 9-10-13. Urinalysis negative on 9-10-13. Urinalysis and urine microscopy showed some signs of urinary tract infect    Salivation excessive 12/17/2008    Overview:  may be due to postnasal drip causing excessive salivation as she tends to swallow the postnasal drainage.She is not candidate for tricyclic antidepressant like nortriptyline (can cause dry mouth) to lessen salivary secretions as this would excerbate her dry lips.    SOB (shortness of breath) 05/13/2014    Overview:  XR CHEST 2 VIEWS PA AND LATERAL 5-13-14: Impression: On the lateral view, a small patchy opacity is again visualized and has a few linear markings. This may be chronic, it is suggested on the prior exam slightly more prominent but this is probably due to technique, could represent chronic areas of subsegmental volume loss or previous consolidation. It is not well visualized on the PA vie    Tremor, essential 11/22/2011    Overview:  Neurology consult Dr. Luther Armenta on 3-26-12. Impression essential tremor. MRI brain. Neurontin 100 mg bid started to help tremors and bilateral lower extremity paresthesia thought due to impaired FG or prediabetes. Normal EMG of lower extremity on 4-11-12 but compound motor action potentials low which can be seen in early axonal neuropathy. Developed swelling with gabapentin (Neurontin    Vitamin D insufficiency 12/03/2012    Overview:  Vitamin D was low at 28.1 on 12-3-12. Take vitamin D 3000 units daily for 8 weeks, then take vitamin D 2000 units indefinitely. Vitamin D was normal at 40.8 on 3-6-13. Continue vitamin D 2000 units indefinitely.    Vitreous degeneration 10/22/2018    Overview:  Right eye      Past Surgical History:   Procedure Laterality Date    ABDOMEN SURGERY      APPENDECTOMY      BLEPHAROPLASTY Bilateral 11/07/2013    FINGER GANGLION CYST EXCISION Right 06/23/2016    right ring finger    LUMPECTOMY BREAST  11/21/2006    Infiltrating ductal carcinoma, micropapillary  variant, Janey    MASTECTOMY MODIFIED RADICAL Left 2006    OTHER SURGICAL HISTORY Right 2013    FL REMOVE EYELID LESN (NOT CHALAZION)    OTHER SURGICAL HISTORY Right 2018    US BIOPSY BREAST NEEDLE W BIBIANA W GUIDE RIGHT    OTHER SURGICAL HISTORY Right 2018    RIGHT SIMPLE MASTECTOMY WITH RIGHT SENTINAL LYMPH NODE [id number] AND RIGHT AXILLARY NODE DISSECTION      Allergies   Allergen Reactions    Cephalexin Shortness Of Breath     Bladder burning    Amitriptyline Unknown    Gabapentin Swelling     Shortness of breath    Latex Swelling and Other (See Comments)    Sulfa Antibiotics Unknown    Tetracyclines & Related Unknown      Social History     Tobacco Use    Smoking status: Former     Current packs/day: 0.00     Average packs/day: 0.5 packs/day for 40.0 years (20.0 ttl pk-yrs)     Types: Cigarettes     Start date: 1965     Quit date: 2005     Years since quittin.4    Smokeless tobacco: Never   Substance Use Topics    Alcohol use: No     Comment: Alcoholic Drinks/day: occassionaly       Wt Readings from Last 1 Encounters:   24 71.9 kg (158 lb 8 oz)        Anesthesia Evaluation            ROS/MED HX  ENT/Pulmonary: Comment: Recurrent aspiration pneumonia    (+)                          COPD,              Neurologic: Comment: Tremor      Cardiovascular:       METS/Exercise Tolerance:     Hematologic:       Musculoskeletal:       GI/Hepatic:     (+) GERD,                   Renal/Genitourinary:       Endo:       Psychiatric/Substance Use:     (+) psychiatric history anxiety and depression       Infectious Disease:       Malignancy:   (+) Malignancy, History of Breast.    Other:            Physical Exam    Airway  airway exam normal      Mallampati: I       Respiratory Devices and Support         Dental       (+) Minor Abnormalities - some fillings, tiny chips      Cardiovascular   cardiovascular exam normal          Pulmonary   pulmonary exam normal                OUTSIDE  "LABS:  CBC:   Lab Results   Component Value Date    WBC 6.6 07/23/2024    HGB 14.3 07/23/2024    HCT 44.3 07/23/2024     07/23/2024     BMP:   Lab Results   Component Value Date    CR 0.69 10/31/2018     COAGS: No results found for: \"PTT\", \"INR\", \"FIBR\"  POC: No results found for: \"BGM\", \"HCG\", \"HCGS\"  HEPATIC: No results found for: \"ALBUMIN\", \"PROTTOTAL\", \"ALT\", \"AST\", \"GGT\", \"ALKPHOS\", \"BILITOTAL\", \"BILIDIRECT\", \"KIM\"  OTHER:   Lab Results   Component Value Date    A1C 5.5 11/02/2023    TSH 0.81 10/13/2023       Anesthesia Plan    ASA Status:  3    NPO Status:  NPO Appropriate    Anesthesia Type: General.     - Airway: ETT   Induction: Intravenous, Propofol.   Maintenance: TIVA.        Consents    Anesthesia Plan(s) and associated risks, benefits, and realistic alternatives discussed. Questions answered and patient/representative(s) expressed understanding.     - Discussed: Risks, Benefits and Alternatives for the PROCEDURE were discussed     - Discussed with:  Patient            Postoperative Care    Pain management: Oral pain medications, IV analgesics, Multi-modal analgesia.   PONV prophylaxis: Ondansetron (or other 5HT-3), Dexamethasone or Solumedrol, Background Propofol Infusion     Comments:               Ochoa Tamez MD    I have reviewed the pertinent notes and labs in the chart from the past 30 days and (re)examined the patient.  Any updates or changes from those notes are reflected in this note.              # Overweight: Estimated body mass index is 29.73 kg/m  as calculated from the following:    Height as of 7/23/24: 1.555 m (5' 1.22\").    Weight as of 7/23/24: 71.9 kg (158 lb 8 oz).      "

## 2024-07-23 NOTE — PATIENT INSTRUCTIONS

## 2024-07-24 ENCOUNTER — HOSPITAL ENCOUNTER (OUTPATIENT)
Facility: AMBULATORY SURGERY CENTER | Age: 84
Discharge: HOME | End: 2024-07-24
Attending: SURGERY
Payer: MEDICARE

## 2024-07-24 ENCOUNTER — ANESTHESIA (OUTPATIENT)
Dept: SURGERY | Facility: AMBULATORY SURGERY CENTER | Age: 84
End: 2024-07-24
Payer: MEDICARE

## 2024-07-24 VITALS
SYSTOLIC BLOOD PRESSURE: 133 MMHG | DIASTOLIC BLOOD PRESSURE: 85 MMHG | HEART RATE: 82 BPM | HEIGHT: 61 IN | OXYGEN SATURATION: 93 % | WEIGHT: 158.29 LBS | TEMPERATURE: 97.7 F | RESPIRATION RATE: 16 BRPM | BODY MASS INDEX: 29.89 KG/M2

## 2024-07-24 DIAGNOSIS — E04.1 THYROID NODULE: Primary | ICD-10-CM

## 2024-07-24 PROCEDURE — 36590 REMOVAL TUNNELED CV CATH: CPT | Mod: LT

## 2024-07-24 PROCEDURE — 88300 SURGICAL PATH GROSS: CPT | Mod: 26 | Performed by: STUDENT IN AN ORGANIZED HEALTH CARE EDUCATION/TRAINING PROGRAM

## 2024-07-24 PROCEDURE — 60220 PARTIAL REMOVAL OF THYROID: CPT | Mod: LT | Performed by: SURGERY

## 2024-07-24 PROCEDURE — 60200 REMOVE THYROID LESION: CPT | Performed by: ANESTHESIOLOGY

## 2024-07-24 PROCEDURE — 99100 ANES PT EXTEME AGE<1 YR&>70: CPT | Performed by: NURSE ANESTHETIST, CERTIFIED REGISTERED

## 2024-07-24 PROCEDURE — 88342 IMHCHEM/IMCYTCHM 1ST ANTB: CPT | Mod: 26 | Performed by: STUDENT IN AN ORGANIZED HEALTH CARE EDUCATION/TRAINING PROGRAM

## 2024-07-24 PROCEDURE — 36590 REMOVAL TUNNELED CV CATH: CPT | Mod: LT | Performed by: SURGERY

## 2024-07-24 PROCEDURE — 88342 IMHCHEM/IMCYTCHM 1ST ANTB: CPT | Mod: TC,XU | Performed by: SURGERY

## 2024-07-24 PROCEDURE — 60220 PARTIAL REMOVAL OF THYROID: CPT | Mod: LT

## 2024-07-24 PROCEDURE — 60200 REMOVE THYROID LESION: CPT | Performed by: NURSE ANESTHETIST, CERTIFIED REGISTERED

## 2024-07-24 PROCEDURE — 88300 SURGICAL PATH GROSS: CPT | Mod: TC | Performed by: SURGERY

## 2024-07-24 PROCEDURE — 99100 ANES PT EXTEME AGE<1 YR&>70: CPT | Performed by: ANESTHESIOLOGY

## 2024-07-24 PROCEDURE — 88360 TUMOR IMMUNOHISTOCHEM/MANUAL: CPT | Mod: 26 | Performed by: STUDENT IN AN ORGANIZED HEALTH CARE EDUCATION/TRAINING PROGRAM

## 2024-07-24 PROCEDURE — 88307 TISSUE EXAM BY PATHOLOGIST: CPT | Mod: 26 | Performed by: STUDENT IN AN ORGANIZED HEALTH CARE EDUCATION/TRAINING PROGRAM

## 2024-07-24 PROCEDURE — 88341 IMHCHEM/IMCYTCHM EA ADD ANTB: CPT | Mod: 26 | Performed by: STUDENT IN AN ORGANIZED HEALTH CARE EDUCATION/TRAINING PROGRAM

## 2024-07-24 RX ORDER — ACETAMINOPHEN 325 MG/1
975 TABLET ORAL ONCE
Status: COMPLETED | OUTPATIENT
Start: 2024-07-24 | End: 2024-07-24

## 2024-07-24 RX ORDER — DEXAMETHASONE SODIUM PHOSPHATE 10 MG/ML
4 INJECTION, SOLUTION INTRAMUSCULAR; INTRAVENOUS
Status: DISCONTINUED | OUTPATIENT
Start: 2024-07-24 | End: 2024-07-25 | Stop reason: HOSPADM

## 2024-07-24 RX ORDER — LIDOCAINE 40 MG/G
CREAM TOPICAL
Status: DISCONTINUED | OUTPATIENT
Start: 2024-07-24 | End: 2024-07-25 | Stop reason: HOSPADM

## 2024-07-24 RX ORDER — NALOXONE HYDROCHLORIDE 0.4 MG/ML
0.1 INJECTION, SOLUTION INTRAMUSCULAR; INTRAVENOUS; SUBCUTANEOUS
Status: DISCONTINUED | OUTPATIENT
Start: 2024-07-24 | End: 2024-07-25 | Stop reason: HOSPADM

## 2024-07-24 RX ORDER — ONDANSETRON 4 MG/1
4 TABLET, ORALLY DISINTEGRATING ORAL EVERY 30 MIN PRN
Status: DISCONTINUED | OUTPATIENT
Start: 2024-07-24 | End: 2024-07-25 | Stop reason: HOSPADM

## 2024-07-24 RX ORDER — ONDANSETRON 2 MG/ML
4 INJECTION INTRAMUSCULAR; INTRAVENOUS EVERY 30 MIN PRN
Status: DISCONTINUED | OUTPATIENT
Start: 2024-07-24 | End: 2024-07-25 | Stop reason: HOSPADM

## 2024-07-24 RX ORDER — AMOXICILLIN 250 MG
1 CAPSULE ORAL DAILY PRN
Qty: 30 TABLET | Refills: 0 | Status: SHIPPED | OUTPATIENT
Start: 2024-07-24 | End: 2024-08-23

## 2024-07-24 RX ORDER — GLYCOPYRROLATE 0.2 MG/ML
INJECTION, SOLUTION INTRAMUSCULAR; INTRAVENOUS PRN
Status: DISCONTINUED | OUTPATIENT
Start: 2024-07-24 | End: 2024-07-24

## 2024-07-24 RX ORDER — ACETAMINOPHEN 325 MG/1
975 TABLET ORAL EVERY 8 HOURS
Qty: 270 TABLET | Refills: 0 | Status: SHIPPED | OUTPATIENT
Start: 2024-07-24 | End: 2024-07-24

## 2024-07-24 RX ORDER — FENTANYL CITRATE 50 UG/ML
INJECTION, SOLUTION INTRAMUSCULAR; INTRAVENOUS PRN
Status: DISCONTINUED | OUTPATIENT
Start: 2024-07-24 | End: 2024-07-24

## 2024-07-24 RX ORDER — HYDROMORPHONE HYDROCHLORIDE 1 MG/ML
0.4 INJECTION, SOLUTION INTRAMUSCULAR; INTRAVENOUS; SUBCUTANEOUS EVERY 5 MIN PRN
Status: DISCONTINUED | OUTPATIENT
Start: 2024-07-24 | End: 2024-07-25 | Stop reason: HOSPADM

## 2024-07-24 RX ORDER — HYDROMORPHONE HYDROCHLORIDE 1 MG/ML
0.2 INJECTION, SOLUTION INTRAMUSCULAR; INTRAVENOUS; SUBCUTANEOUS EVERY 5 MIN PRN
Status: DISCONTINUED | OUTPATIENT
Start: 2024-07-24 | End: 2024-07-25 | Stop reason: HOSPADM

## 2024-07-24 RX ORDER — FENTANYL CITRATE 50 UG/ML
50 INJECTION, SOLUTION INTRAMUSCULAR; INTRAVENOUS EVERY 5 MIN PRN
Status: DISCONTINUED | OUTPATIENT
Start: 2024-07-24 | End: 2024-07-25 | Stop reason: HOSPADM

## 2024-07-24 RX ORDER — ONDANSETRON 2 MG/ML
INJECTION INTRAMUSCULAR; INTRAVENOUS PRN
Status: DISCONTINUED | OUTPATIENT
Start: 2024-07-24 | End: 2024-07-24

## 2024-07-24 RX ORDER — DEXAMETHASONE SODIUM PHOSPHATE 10 MG/ML
10 INJECTION, SOLUTION INTRAMUSCULAR; INTRAVENOUS ONCE
Status: COMPLETED | OUTPATIENT
Start: 2024-07-24 | End: 2024-07-24

## 2024-07-24 RX ORDER — SODIUM CHLORIDE, SODIUM LACTATE, POTASSIUM CHLORIDE, CALCIUM CHLORIDE 600; 310; 30; 20 MG/100ML; MG/100ML; MG/100ML; MG/100ML
INJECTION, SOLUTION INTRAVENOUS CONTINUOUS
Status: DISCONTINUED | OUTPATIENT
Start: 2024-07-24 | End: 2024-07-25 | Stop reason: HOSPADM

## 2024-07-24 RX ORDER — PROPOFOL 10 MG/ML
INJECTION, EMULSION INTRAVENOUS PRN
Status: DISCONTINUED | OUTPATIENT
Start: 2024-07-24 | End: 2024-07-24

## 2024-07-24 RX ORDER — VASOPRESSIN IN 0.9 % NACL 2 UNIT/2ML
SYRINGE (ML) INTRAVENOUS PRN
Status: DISCONTINUED | OUTPATIENT
Start: 2024-07-24 | End: 2024-07-24

## 2024-07-24 RX ORDER — LIDOCAINE HYDROCHLORIDE 20 MG/ML
INJECTION, SOLUTION INFILTRATION; PERINEURAL PRN
Status: DISCONTINUED | OUTPATIENT
Start: 2024-07-24 | End: 2024-07-24

## 2024-07-24 RX ORDER — OXYCODONE HYDROCHLORIDE 5 MG/1
5 TABLET ORAL EVERY 6 HOURS PRN
Qty: 5 TABLET | Refills: 0 | Status: SHIPPED | OUTPATIENT
Start: 2024-07-24 | End: 2024-07-27

## 2024-07-24 RX ORDER — PROPOFOL 10 MG/ML
INJECTION, EMULSION INTRAVENOUS CONTINUOUS PRN
Status: DISCONTINUED | OUTPATIENT
Start: 2024-07-24 | End: 2024-07-24

## 2024-07-24 RX ORDER — ACETAMINOPHEN 325 MG/1
650 TABLET ORAL
Status: DISCONTINUED | OUTPATIENT
Start: 2024-07-24 | End: 2024-07-25 | Stop reason: HOSPADM

## 2024-07-24 RX ORDER — FENTANYL CITRATE 50 UG/ML
25 INJECTION, SOLUTION INTRAMUSCULAR; INTRAVENOUS EVERY 5 MIN PRN
Status: DISCONTINUED | OUTPATIENT
Start: 2024-07-24 | End: 2024-07-25 | Stop reason: HOSPADM

## 2024-07-24 RX ORDER — OXYCODONE HYDROCHLORIDE 5 MG/1
5 TABLET ORAL
Status: DISCONTINUED | OUTPATIENT
Start: 2024-07-24 | End: 2024-07-25 | Stop reason: HOSPADM

## 2024-07-24 RX ADMIN — GLYCOPYRROLATE 0.2 MG: 0.2 INJECTION, SOLUTION INTRAMUSCULAR; INTRAVENOUS at 08:37

## 2024-07-24 RX ADMIN — Medication 100 MCG: at 09:27

## 2024-07-24 RX ADMIN — SODIUM CHLORIDE, SODIUM LACTATE, POTASSIUM CHLORIDE, CALCIUM CHLORIDE: 600; 310; 30; 20 INJECTION, SOLUTION INTRAVENOUS at 06:43

## 2024-07-24 RX ADMIN — Medication 0.5 MCG/KG/MIN: at 09:24

## 2024-07-24 RX ADMIN — FENTANYL CITRATE 25 MCG: 50 INJECTION, SOLUTION INTRAMUSCULAR; INTRAVENOUS at 08:26

## 2024-07-24 RX ADMIN — Medication 1 UNITS: at 09:27

## 2024-07-24 RX ADMIN — DEXAMETHASONE SODIUM PHOSPHATE 10 MG: 10 INJECTION, SOLUTION INTRAMUSCULAR; INTRAVENOUS at 08:36

## 2024-07-24 RX ADMIN — ONDANSETRON 4 MG: 2 INJECTION INTRAMUSCULAR; INTRAVENOUS at 08:36

## 2024-07-24 RX ADMIN — LIDOCAINE HYDROCHLORIDE 80 MG: 20 INJECTION, SOLUTION INFILTRATION; PERINEURAL at 08:26

## 2024-07-24 RX ADMIN — FENTANYL CITRATE 25 MCG: 50 INJECTION, SOLUTION INTRAMUSCULAR; INTRAVENOUS at 08:35

## 2024-07-24 RX ADMIN — PROPOFOL 100 MCG/KG/MIN: 10 INJECTION, EMULSION INTRAVENOUS at 09:16

## 2024-07-24 RX ADMIN — Medication 1 UNITS: at 08:55

## 2024-07-24 RX ADMIN — Medication 100 MCG: at 08:34

## 2024-07-24 RX ADMIN — PROPOFOL 130 MG: 10 INJECTION, EMULSION INTRAVENOUS at 08:26

## 2024-07-24 RX ADMIN — Medication 150 MCG: at 08:48

## 2024-07-24 RX ADMIN — Medication 1 UNITS: at 09:06

## 2024-07-24 RX ADMIN — PROPOFOL 150 MCG/KG/MIN: 10 INJECTION, EMULSION INTRAVENOUS at 08:26

## 2024-07-24 RX ADMIN — Medication 2 UNITS: at 09:11

## 2024-07-24 RX ADMIN — FENTANYL CITRATE 25 MCG: 50 INJECTION, SOLUTION INTRAMUSCULAR; INTRAVENOUS at 08:52

## 2024-07-24 RX ADMIN — Medication 100 MCG: at 08:37

## 2024-07-24 RX ADMIN — ACETAMINOPHEN 975 MG: 325 TABLET ORAL at 06:30

## 2024-07-24 NOTE — DISCHARGE INSTRUCTIONS
Follow up appointment with Dr. Jose as previously scheduled.    Please contact us if you have fever >101.5 or your incision shows signs of infection (spreading painful redness, pus drainage), or if you have persistent hoarseness (some is normal after having a breathing tube down your throat), difficulty swallowing, or vomiting. Also contact with any questions or concerns.   Feel free to contact Dr. Jose directly at her cell phone: [phone], or contact the Surgical Oncology clinic [phone].    For pain control:  -We recommend taking Tylenol around the clock for baseline pain control (this has been prescribed for you, or you may pick it up over the counter).   -You may take ibuprofen or other NSAIDs (non-steroidal anti-inflammatory medications) as needed as well if you still need additional pain medication.    If you find that you are having numbness or tingling in your fingers or toes or around your mouth, these could be symptoms of low blood calcium (which can happen if your other parathyroids happen to be asleep and we take out the over-functioning gland). If you have this, please chew 2 Tums three times that day and let our office know that you are having these symptoms.    No dietary restrictions, just eat healthy and high-protein foods to help with healing.  No activity restrictions. If it hurts, back off or don't do that activity. Ok to move your neck as you can tolerate it..    Incision care:  Ok to shower postoperative day #1. No soaking the incision until it is healed, but you may let soapy water run over it.  No care is needed for the incision; it was closed with absorbable suture and surgical superglue. The superglue will peel off on its own after 7-14 days. There are two small tails of suture on either end of your incision; this will be trimmed at your follow-up appointment if still present.         [facility]  Home Care Following Anesthesia  For 24  hours after surgery:  Get plenty of rest.  A responsible adult must stay with you for at least 24 hours after you leave the surgery center.  Do not drive or use heavy equipment.  If you have weakness or tingling, don't drive or use heavy equipment until this feeling goes away.   Do not drink alcohol.   Avoid strenuous or risky activities.  Ask for help when climbing stairs.  You may feel lightheaded.  IF so, sit for a few minutes before standing.  Have someone help you get up.   If you have nausea (feel sick to your stomach): Drink only clear liquids such as apple juice, ginger ale, broth or 7-Up.  Rest may also help.  Be sure to drink enough fluids.  Move to a regular diet as you feel able.   You may have a slight fever.  Call the doctor if your fever is over 100 F (37.7 C) (taken under the tongue) or lasts longer than 24 hours.  You may have a dry mouth, a sore throat, muscle aches or trouble sleeping. These should go away after 24 hours.  Do not make important or legal decisions.   It is recommended to avoid smoking.               Tips for taking pain medications  To get the best pain relief possible, remember these points:  Take pain medications as directed, before pain becomes severe.  Pain medication can upset your stomach: taking it with food may help.  Constipation is a common side effect of pain medication. Drink plenty of  fluids.  Eat foods high in fiber. Take a stool softener if recommended by your doctor or pharmacist.  Do not drink alcohol, drive or operate machinery while taking pain medications.  Ask about other ways to control pain, such as with heat, ice or relaxation.    Tylenol/Acetaminophen Consumption    If you feel your pain relief is insufficient, you may take Tylenol/Acetaminophen in addition to your narcotic pain medication.   Be careful not to exceed 4,000 mg of Tylenol/Acetaminophen in a 24 hour period from all sources.  If you are taking extra strength Tylenol/acetaminophen (500 mg), the  maximum dose is 8 tablets in 24 hours.  If you are taking regular strength acetaminophen (325 mg), the maximum dose is 12 tablets in 24 hours.  Tylenol 975 mg given at 6:30 am.   Ok to take more after 12:30 pm.      Call a doctor for any of the following:  Signs of infection (fever, growing tenderness at the surgery site, a large amount of drainage or bleeding, severe pain, foul-smelling drainage, redness, swelling).  It has been over 8 to 10 hours since surgery and you are still not able to urinate (pass water).  Headache for over 24 hours.  Numbness, tingling or weakness the day after surgery (if you had spinal anesthesia).  Signs of Covid-19 infection (temperature over 100 degrees, shortness of breath, cough, loss of taste/smell, generalized body aches, persistent headache, chills, sore throat, nausea/vomiting/diarrhea)  Your doctor is:  Dr. Eryn Jose, ENT Otolaryngology: [phone]                    Or dial [phone] and ask for the resident on call for:  ENT Otolaryngology  For emergency care, call the:  [facility]:  [phone] (TTY for hearing impaired: [phone])      A collaboration between [facility] and [facility]  Experts in minimally invasive, targeted treatments performed using imaging guidance    Venous Access Device, Port Catheter or Tunneled Central Line Removal    Today you had your existing venous access device removed, either because it was no longer needed or because there was malfunction or infection issues.    After you go home:  Drink plenty of fluids.  Generally 6-8 (8 ounce) glasses a day is recommended.  Resume your regular diet unless otherwise ordered by a medical provider.  Keep any applied tape/gauze dressings clean and dry.  Change tape/gauze dressings if they get wet or soiled.  You may shower the following day after procedure, however cover and protect from moisture any tape/gauze dressings.  You  may let water hit and run over dried skin glue, but do not scrub.  Pat the area dry after showering.  Port removal incisions are closed with absorbable suture, meaning they do not need to be removed at a later date, and a topical skin adhesive (skin glue).  This glue will wear off in 7-14 days.  Do not remove before this time.  If 14 days have passed and residual glue is present, you may gently remove it.  You may remove tape/gauze dressings after 5 days if the site looks closed and in the process of healing.  Do not apply gels, lotions, or ointments to the glue site for the first 10 days as this may cause the glue to prematurely soften and fail.  Do not perform strenuous activities or lift greater than 10 pounds for the next three days.  If there is bleeding or oozing from the procedure site, apply firm pressure to the area for 5-10 minutes.  If the bleeding continues seek medical advice at the numbers below.  Mild procedure site discomfort can be treated with an ice pack and over-the-counter pain relievers.              For 24 hours after any sedation used:  Relax and take it easy.  No strenuous activities.  Do not drive or operate machines at home or at work.  No alcohol consumption.  Do not make any important or legal decisions.    Call our Interventional Radiology (IR) service if:  If you start bleeding from the procedure site.  If you do start to bleed from the site, lie down and hold some pressure on the site.  Our radiology provider can help you decide if you need to return to the hospital.  If you have new or worsening pain related to the procedure.  If you have concerning swelling at the procedure site.  If you develop persistent nausea or vomiting.  If you develop hives or a rash or any unexplained itching.  If you have a fever of greater than 100.5  F and chills in the first 5 days after procedure.  Any other concerns related to your procedure.      [facility]  Interventional  Radiology (IR)  [address]  2nd Floor, [facility] [place] [address]    Contact Number:  [phone]  (IR Nurse Triage)  Monday - Friday 7am - 4pm    After hours for urgent concerns:  [phone]  After 4pm Monday - Friday, Weekends and Holidays.   Ask for Interventional Radiology on-call.  Someone is available 24 hours a day.  [facility] toll free number:  [phone]

## 2024-07-24 NOTE — ANESTHESIA POSTPROCEDURE EVALUATION
Patient: Jody Ch    Procedure: Procedure(s):  Left thyroid lobectomy and isthmusectomy, Vascular Port Removal  Vascular Port Removal  (Left)       Anesthesia Type:  General    Note:  Disposition: Outpatient   Postop Pain Control: Uneventful            Sign Out: Well controlled pain   PONV: No   Neuro/Psych: Uneventful            Sign Out: Acceptable/Baseline neuro status   Airway/Respiratory: Uneventful            Sign Out: Acceptable/Baseline resp. status   CV/Hemodynamics: Uneventful            Sign Out: Acceptable CV status; No obvious hypovolemia; No obvious fluid overload   Other NRE: NONE   DID A NON-ROUTINE EVENT OCCUR?            Last vitals:  Vitals Value Taken Time   /78 07/24/24 1030   Temp 36.7  C (98  F) 07/24/24 1030   Pulse 81 07/24/24 1030   Resp 16 07/24/24 1030   SpO2 95 % 07/24/24 1030       Electronically Signed By: Ochoa Tamez MD  July 24, 2024  11:58 AM

## 2024-07-24 NOTE — BRIEF OP NOTE
[facility]    Brief Operative Note    Pre-operative diagnosis: Thyroid nodule [E04.1]  Post-operative diagnosis Same as pre-operative diagnosis    Procedure: Left thyroid lobectomy and isthmusectomy, Vascular Port Removal, Left - Neck  Vascular Port Removal  (Left), Left - Chest    Surgeon: Surgeons and Role:  Panel 1:     * Eryn Jose MD - Primary     * Augustine Martinez MD - Resident - Assisting  Panel 2:     * Eryn Jose MD - Primary     * Augustine Martinez MD - Resident - Assisting  Anesthesia: General   Estimated Blood Loss: Minimal    Drains: None  Specimens:   ID Type Source Tests Collected by Time Destination   1 : Left Lobe of Thyroid and Isthmus Tissue Thyroid, left SURGICAL PATHOLOGY EXAM Eryn Jose MD 7/24/2024  9:22 AM    2 : Port Foreign Body Chest SURGICAL PATHOLOGY EXAM Eryn Jose MD 7/24/2024  9:34 AM      Findings:   None. Left thyroidectomy performed. Vascular port also removed. Incisions closed with dissolvable sutures and covered with dermabond.  Complications: None.  Implants: * No implants in log *

## 2024-07-24 NOTE — ANESTHESIA CARE TRANSFER NOTE
Patient: Jody Ch    Procedure: Procedure(s):  Left thyroid lobectomy and isthmusectomy, Vascular Port Removal  Vascular Port Removal  (Left)       Diagnosis: Thyroid nodule [E04.1]  Diagnosis Additional Information: No value filed.    Anesthesia Type:   General     Note:    Oropharynx: oropharynx clear of all foreign objects and spontaneously breathing  Level of Consciousness: awake  Oxygen Supplementation: face mask  Level of Supplemental Oxygen (L/min / FiO2): 6  Independent Airway: airway patency satisfactory and stable    Vital Signs Stable: post-procedure vital signs reviewed and stable  Report to RN Given: handoff report given  Patient transferred to: PACU  Comments: VSS and WNL, comfortable, no PONV, report to Rut RN  Handoff Report: Identifed the Patient, Identified the Reponsible Provider, Reviewed the pertinent medical history, Discussed the surgical course, Reviewed Intra-OP anesthesia mangement and issues during anesthesia, Set expectations for post-procedure period and Allowed opportunity for questions and acknowledgement of understanding      Vitals:  Vitals Value Taken Time   /80 07/24/24 1000   Temp 36.8  C (98.2  F) 07/24/24 1000   Pulse 85 07/24/24 1006   Resp 16 07/24/24 1006   SpO2 99 % 07/24/24 1006   Vitals shown include unfiled device data.    Electronically Signed By: MICHEL Deal CRNA  July 24, 2024  10:07 AM

## 2024-07-24 NOTE — ANESTHESIA PROCEDURE NOTES
Airway       Patient location during procedure: OR       Procedure Start/Stop Times: 7/24/2024 8:27 AM  Staff -        Performed By: CRNAIndications and Patient Condition       Indications for airway management: stephie-procedural       Induction type:intravenous       Mask difficulty assessment: 1 - vent by mask    Final Airway Details       Final airway type: endotracheal airway       Successful airway: ETT - single, Oral and NIM  Endotracheal Airway Details        ETT size (mm): 6.0       Successful intubation technique: video laryngoscopy       VL Blade Size: MAC 3       Grade View of Cords: 1       Adjucts: stylet       Position: Right       Measured from: lips       Secured at (cm): 21       Bite block used: None    Post intubation assessment        Placement verified by: capnometry and equal breath sounds        Number of attempts at approach: 1       Number of other approaches attempted: 0       Secured with: tape       Ease of procedure: easy       Dentition: Intact and Unchanged    Medication(s) Administered   Medication Administration Time: 7/24/2024 8:27 AM

## 2024-07-25 NOTE — RESULT ENCOUNTER NOTE
Jody,  Your lab results were normal/stable. Please feel free to my chart or call the office with questions. Amparo Mays M.D.

## 2024-07-29 ENCOUNTER — TELEPHONE (OUTPATIENT)
Dept: SURGERY | Facility: CLINIC | Age: 84
End: 2024-07-29
Payer: MEDICARE

## 2024-07-29 NOTE — TELEPHONE ENCOUNTER
[facility] Call Center    Phone Message    May a detailed message be left on voicemail: yes     Reason for Call: Other: Mikala is calling in asking for a call back to discuss whether she would be able to schedule a video visit to follow-up after her recent procedure with Dr. Jose. Please call back to discuss.     Action Taken: Message routed to:  [facility] Gen Surg    Travel Screening: Not Applicable     Date of Service:

## 2024-07-29 NOTE — TELEPHONE ENCOUNTER
Sent patient a mychart with post op date and my direct line    Mary Alice Morris  7/29/2024 at 3:02 PM

## 2024-07-30 ENCOUNTER — MYC MEDICAL ADVICE (OUTPATIENT)
Dept: FAMILY MEDICINE | Facility: CLINIC | Age: 84
End: 2024-07-30
Payer: MEDICARE

## 2024-07-31 NOTE — OP NOTE
Pre-operative diagnosis:         Thyroid nodule [E04.1]  Post-operative diagnosis        Same as pre-operative diagnosis     Procedure:      Left thyroid lobectomy and isthmusectomy, Vascular Port Removal, Left - Neck  Vascular Port Removal  (Left), Left - Chest     Surgeon:         Surgeons and Role:  Panel 1:     * Eryn Jose MD - Primary     * Augustine Martinez MD - Resident - Assisting  Panel 2:     * Eryn Jose MD - Primary     * Augustine Martinez MD - Resident - Assisting  Anesthesia:     General             Estimated Blood Loss: Minimal     Drains: None     Clinical indications for the procedure:  This is a 83-year-old female with a pertinent past medical history remotely of breast cancer for which she had a port placed almost 10 years ago.  She is last accessed that port 4 years ago.  Throughout the year she has noted to have a left thyroid nodule.  Ultrasound confirmed a 4.7 cm left thyroid nodule with fluctuation in size ranging from 4.1 to 5.1 cm.  TR 3.  Fine-needle aspiration of this left thyroid nodule was atypia of undetermined significance.  Afirma was suspicious but no mutations or fusions identified.  Based on this it was recommended that the patient undergo a left thyroid lobectomy and isthmusectomy as well as removal of Mediport.  The surgical procedure was discussed with the patient including but not limited to the risks of bleeding infection injury to the recurrent laryngeal nerve or nerves potential permanent hypocalcemia or loss of airway.  We also discussed possible embolic event associated with removal of the port.  She is aware of this is agreed to proceed with surgery and consent was obtained and the site was marked.    Details of procedure:  We proceeded with the left thyroid lobectomy first.    Details of the procedure:  The patient was brought to the operating room in stable condition placed in the operating room table supine position.  After appropriate general  anesthesia was obtained the patient was prepped and draped in sterile fashion.  A timeout was then performed.  A 4 cm incision was made over the anterior neck following a natural skin crease.  The platysma was then divided and subplatysmal planes were then created.  The strap muscles were divided at the midline and retracted laterally.  The superior pole of the left lobe of the thyroid gland was retracted inferior laterally.  The superior thyroidal vessels were separately skeletonized clipped and divided.  We then carried our dissection inferolaterally to identify the middle thyroid vein.  This was also separately skeletonized clipped and divided.  As we rotated the gland from lateral to medial manner we identified the left superior parathyroid gland dissected this from the undersurface of the thyroid maintaining its viability.  The left recurrent laryngeal nerve was identified we followed this from a superior to inferior manner dissecting the thyroid gland off of it anteriorly.  As we dissected inferiorly we identified the left inferior parathyroid gland dissecting it from the undersurface of the thyroid maintain its viability.  The left inferior thyroidal vessels were separately skeletonized clipped and divided.  We continued rotated the gland from lateral to medial manner dissecting it over the anterior portion of the trachea.  The veins draining the isthmus were separately clipped and divided.  The thyroid gland was then divided to the right of the isthmus using the LigaSure.  The specimen was sent for permanent pathologic evaluation.  The area was irrigated.  Valsalva maneuver was performed.  No obvious bleeding was identified.  Left superior and left inferior parathyroid glands were intact and viable.  Left recurrent laryngeal nerve was intact visibly as well as with nerve stimulation.  Fibrillar was then placed in the operative bed.  The strap muscles were approximated the midline using a 3-0 chromic  interrupted suture.  The platysma was approximated using a 3-0 chromic interrupted suture.  And the skin incision was approximated using a 5-0 Monocryl running subcuticular suture.  The wound was dressed Dermabond.        An incision was made over the previous incision site of the left chest wall port.  I dissected down through the capsule of the port.  The port was identified.  It was actually sewn in place and the sutures were removed.  We were then able to withdraw the catheter as it went from the anterior chest wall and a 6 subcutaneous fashion into the left internal jugular vein.  This was withdrawn without difficulty.  Pressure was held over its insertion into the left internal jugular vein.  The port pocket was then closed with 4-0 Vicryl.  The subcutaneous tissue was proximal mated with 4-0 Vicryl interrupted suture.  And the skin incision was approximated using a 5-0 Monocryl running subcuticular suture.  The wound was dressed Dermabond.    The patient was then extubated and returned to the recovery room stable condition.  I was present during the entire surgical procedure.    Eryn Jose MD

## 2024-08-07 NOTE — PROGRESS NOTES
SURGERY CLINIC CONSULTATION    REASON FOR CONSULTATION:  Jody Ch was referred by Dr. Castellano for evaluation and discussion of treatment options for thyroid nodule     HISTORY OF PRESENT ILLNESS:  Jody Ch is a 83 year old female who was noted to have a thyroid nodule in 2023.  Ultrasound confirmed a 4.7 cm thyroid nodule.  Biopsy in December 2023 was AUS.  Afirma was read as suspicious but no mutations or fusions identified.  Repeat ultrasound performed 4 months later noted that the nodule had increased in size now measuring 5.1 cm repeat biopsy AUS.  Thyroid function tests are within normal limits.    Symptoms associated with this thyroid nodule she admits to some intermittent hoarseness but occurs maybe once a week.  She can feel fullness with swallowing.  She has had a swallow evaluation that has been negative.  No problems with inspiration.  She has no previous head neck radiation treatment but does have a history of breast cancer that has been treated in remission greater than 5 years.  Has a brother who had a thyroidectomy for goiter.  No previous history of thyroid cancer      REVIEW OF SYSTEMS:  ROS EXAM: 10 point view of systems is pertinent for that known HPI  Patient Active Problem List   Diagnosis    ACP (advance care planning)    Atrophic vaginitis    Bilateral leg paresthesia    Chronic cough    Chronic diarrhea    Chronic rhinitis    Dry mouth    Gallbladder polyp    History of left breast cancer    Hypercholesteremia    IC (interstitial cystitis)    Ileitis    Impaired fasting glucose    Osteopenia    Peripheral axonal neuropathy    Pharyngeal dysphagia    SOB (shortness of breath)    Tremor, essential    Vitamin D insufficiency    Vitreous degeneration    Post-mastectomy lymphedema syndrome    Contracture of right shoulder    S/P bilateral cataract extraction    Recurrent aspiration pneumonia (H)    Oral phase dysphagia    ADAN (generalized anxiety disorder)    Diverticulitis of  intestine, part unspecified, without perforation or abscess without bleeding    GERD (gastroesophageal reflux disease)    Osteoporosis, senile    Vitamin B12 deficiency    Acute midline low back pain without sciatica    Atherosclerotic vascular disease    Colloid thyroid nodule    Estrogen receptor negative status (ER-)    Recurrent UTI    Gastroesophageal reflux disease    History of malignant neoplasm of breast    Major depression, recurrent, full remission (H24)    Mixed stress and urge urinary incontinence    Pulmonary emphysema (H)    Former smoker    Thyroid nodule       Past Surgical History:   Procedure Laterality Date    ABDOMEN SURGERY      APPENDECTOMY      BLEPHAROPLASTY Bilateral 11/07/2013    EXCISE NODULE THYROID Left 7/24/2024    Procedure: Left thyroid lobectomy and isthmusectomy, Vascular Port Removal;  Surgeon: Eryn Jose MD;  Location: [facility] OR    FINGER GANGLION CYST EXCISION Right 06/23/2016    right ring finger    LUMPECTOMY BREAST  11/21/2006    Infiltrating ductal carcinoma, micropapillary variant, Janey    MASTECTOMY MODIFIED RADICAL Left 12/06/2006    OTHER SURGICAL HISTORY Right 11/07/2013    KS REMOVE EYELID LESN (NOT CHALAZION)    OTHER SURGICAL HISTORY Right 05/18/2018    US BIOPSY BREAST NEEDLE W BIBIANA W GUIDE RIGHT    OTHER SURGICAL HISTORY Right 06/01/2018    RIGHT SIMPLE MASTECTOMY WITH RIGHT SENTINAL LYMPH NODE [id number] AND RIGHT AXILLARY NODE DISSECTION    REMOVE PORT VASCULAR ACCESS Left 7/24/2024    Procedure: Vascular Port Removal  (Left);  Surgeon: Eryn Jose MD;  Location: [facility] OR       Allergies   Allergen Reactions    Cephalexin Shortness Of Breath     Bladder burning    Amitriptyline Unknown    Gabapentin Swelling     Shortness of breath    Latex Swelling and Other (See Comments)    Sulfa Antibiotics Unknown    Tetracyclines & Related Unknown       Medications reviewed in the EMR        Family History   Problem Relation Age of Onset    Diabetes  Brother     Hypertension Brother     Thyroid Disease Brother         Surgery    Prostate Cancer Father     Kidney Disease Father         One kidney does not function    Seizure Disorder Father     Cancer Maternal Grandmother         Bladder    Arthritis Maternal Grandmother     Hypertension Maternal Grandmother     Osteoporosis Maternal Grandmother     Alcoholism Mother     Substance Abuse Mother     Ulcers Mother     Hypertension Mother     Diabetes Brother     Hypertension Brother     Allergic rhinitis Sister     Asthma Sister     Breast Cancer Maternal Cousin         DCIS     I personally reviewed the radiographic images and laboratory data    ASSESSMENT:   1. Thyroid nodule        PLAN:   I recommend a left thyroid lobectomy and isthmusectomy.  The surgical procedure was discussed with the patient including but not limited to the risks of bleeding infection injury to the recurrent laryngeal nerve or nerves potential permanent hypocalcemia or loss of airway.  The patient would like to proceed with surgery.    She did have a question that she has a left Mediport placed over her anterior chest.  She has not used it in greater than 5 years and did request that the port be removed.    The patient's daughter was present during this video visit    Eryn Jose MD

## 2024-08-08 ENCOUNTER — VIRTUAL VISIT (OUTPATIENT)
Dept: SURGERY | Facility: CLINIC | Age: 84
End: 2024-08-08
Payer: MEDICARE

## 2024-08-08 DIAGNOSIS — E89.0 S/P PARTIAL THYROIDECTOMY: Primary | ICD-10-CM

## 2024-08-08 PROCEDURE — 99024 POSTOP FOLLOW-UP VISIT: CPT | Mod: 95 | Performed by: SURGERY

## 2024-08-08 NOTE — PROGRESS NOTES
Virtual Visit Details    Type of service:  Video Visit   Video Start Time: {video visit start/end time for provider to select:[id number]}  Video End Time:{video visit start/end time for provider to select:810008}    Originating Location (pt. Location): Home  {PROVIDER LOCATION On-site should be selected for visits conducted from your clinic location or adjoining [facility] hospital, academic office, or other nearby [facility] building. Off-site should be selected for all other provider locations, including home:[id number]}  Distant Location (provider location):  Off-site  Platform used for Video Visit: Rochelle Bunch, [facility]

## 2024-08-08 NOTE — LETTER
8/8/2024      Jody Ch  [address]  [place] [address]      Dear Colleague,    Thank you for referring your patient, Jody Ch, to the [facility]. Please see a copy of my visit note below.    Due to the COVID 19 pandemic this visit was a video visit in order to help prevent spread of infection in this high risk patient and the general population. The patient gave verbal consent for the visit today.    Start time 9:10am  Stop time 9:25am  Total time 15 minutes face to face conversation video   Chart prep , review of images, results , start note prior to appt 10 minutes   Total 25 minutes     2-week postop visit status post left thyroid lobectomy and isthmusectomy for a greater than 4 cm thyroid nodule.  We also removed a Mediport from her left anterior chest.    Since the surgery the patient denies any problems with voice quality inspiration or swallowing.  She has no concerns about her wound.  She has no symptoms of hypothyroidism.  No symptoms of hypocalcemia.    On physical exam via the video it appears as if the wound is healing well both her neck and her left anterior chest.  The patient has no concerns about the wound characteristics.    I reviewed the pathology with the patient that is a benign thyroid nodule.    Plan:  I reviewed with the patient wound management will massage  I have placed orders for her to have thyroid function test checked at the end of August  The patient will follow-up with me as needed    Eryn Jose MD      Again, thank you for allowing me to participate in the care of your patient.        Sincerely,        Eryn Jose MD

## 2024-08-16 ENCOUNTER — TELEPHONE (OUTPATIENT)
Dept: SURGERY | Facility: CLINIC | Age: 84
End: 2024-08-16
Payer: MEDICARE

## 2024-08-16 NOTE — TELEPHONE ENCOUNTER
[facility] Call Center    Phone Message    May a detailed message be left on voicemail: yes     Reason for Call: Other: Pt called in regards to lab orders for her thyroid function, but no orders have been put in. Please place orders and then call pt once they're placed.      Action Taken: Other: [facility]    Travel Screening: Not Applicable     Date of Service:

## 2024-08-16 NOTE — TELEPHONE ENCOUNTER
Patient had left thyroid lobectomy and isthmusectomy with Dr. Jose on 07/24/24. Patient had virtual post-op with provider on 08/08/24. Office visit note is currently unsigned and no future lab orders are in place. Will route encounter to Dr. Jose for review and lab orders.    Patient has no future lab appointment scheduled at this time.  Nuris Bunch CMA

## 2024-08-19 NOTE — TELEPHONE ENCOUNTER
Lab orders placed by provider. Called patient to inform. Patient will have lab drawn at [place] location and will call facility to schedule appointment.   Nuris Bunch CMA

## 2024-08-21 LAB
PATH REPORT.ADDENDUM SPEC: NORMAL
PATH REPORT.COMMENTS IMP SPEC: NORMAL
PATH REPORT.FINAL DX SPEC: NORMAL
PATH REPORT.GROSS SPEC: NORMAL
PATH REPORT.MICROSCOPIC SPEC OTHER STN: NORMAL
PATH REPORT.RELEVANT HX SPEC: NORMAL
PHOTO IMAGE: NORMAL

## 2024-08-22 ENCOUNTER — TELEPHONE (OUTPATIENT)
Dept: FAMILY MEDICINE | Facility: CLINIC | Age: 84
End: 2024-08-22
Payer: MEDICARE

## 2024-08-22 NOTE — TELEPHONE ENCOUNTER
Call placed to patient  Appointment scheduled for her  instead of patient     Patient states she has had ongoing lower abdominal discomfort that mostly occurs at night   This has been unchanged since onset 2 weeks ago    Offered appointment tomorrow, but patient declined stating she does not feel a sooner appointment is needed  [facility] will go to [facility] if symptoms change or worsen, but does not feel that is necessary and will wait to see Dr. Mays early next week    Rich Mahan, Clinic RN  [facility]

## 2024-08-24 NOTE — PROGRESS NOTES
Due to the COVID 19 pandemic this visit was a video visit in order to help prevent spread of infection in this high risk patient and the general population. The patient gave verbal consent for the visit today.    Start time 9:10am  Stop time 9:25am  Total time 15 minutes face to face conversation video   Chart prep , review of images, results , start note prior to appt 10 minutes   Total 25 minutes     2-week postop visit status post left thyroid lobectomy and isthmusectomy for a greater than 4 cm thyroid nodule.  We also removed a Mediport from her left anterior chest.    Since the surgery the patient denies any problems with voice quality inspiration or swallowing.  She has no concerns about her wound.  She has no symptoms of hypothyroidism.  No symptoms of hypocalcemia.    On physical exam via the video it appears as if the wound is healing well both her neck and her left anterior chest.  The patient has no concerns about the wound characteristics.    I reviewed the pathology with the patient that is a benign thyroid nodule.    Plan:  I reviewed with the patient wound management will massage  I have placed orders for her to have thyroid function test checked at the end of August  The patient will follow-up with me as needed    Eryn Jose MD

## 2024-08-27 ENCOUNTER — OFFICE VISIT (OUTPATIENT)
Dept: FAMILY MEDICINE | Facility: CLINIC | Age: 84
End: 2024-08-27
Payer: MEDICARE

## 2024-08-27 VITALS
WEIGHT: 162 LBS | BODY MASS INDEX: 30.39 KG/M2 | HEART RATE: 78 BPM | TEMPERATURE: 98 F | DIASTOLIC BLOOD PRESSURE: 62 MMHG | OXYGEN SATURATION: 94 % | SYSTOLIC BLOOD PRESSURE: 106 MMHG

## 2024-08-27 DIAGNOSIS — N30.00 ACUTE CYSTITIS WITHOUT HEMATURIA: ICD-10-CM

## 2024-08-27 DIAGNOSIS — M81.0 AGE-RELATED OSTEOPOROSIS WITHOUT CURRENT PATHOLOGICAL FRACTURE: ICD-10-CM

## 2024-08-27 DIAGNOSIS — Z78.0 POSTMENOPAUSAL STATUS: ICD-10-CM

## 2024-08-27 DIAGNOSIS — R30.0 BURNING WITH URINATION: Primary | ICD-10-CM

## 2024-08-27 DIAGNOSIS — E89.0 S/P PARTIAL THYROIDECTOMY: ICD-10-CM

## 2024-08-27 LAB
ALBUMIN UR-MCNC: NEGATIVE MG/DL
APPEARANCE UR: ABNORMAL
BACTERIA #/AREA URNS HPF: ABNORMAL /HPF
BILIRUB UR QL STRIP: NEGATIVE
COLOR UR AUTO: YELLOW
GLUCOSE UR STRIP-MCNC: NEGATIVE MG/DL
HGB UR QL STRIP: ABNORMAL
KETONES UR STRIP-MCNC: NEGATIVE MG/DL
LEUKOCYTE ESTERASE UR QL STRIP: ABNORMAL
NITRATE UR QL: POSITIVE
PH UR STRIP: 5.5 [PH] (ref 5–7)
RBC #/AREA URNS AUTO: ABNORMAL /HPF
SP GR UR STRIP: >=1.03 (ref 1–1.03)
TSH SERPL DL<=0.005 MIU/L-ACNC: 3.46 UIU/ML (ref 0.3–4.2)
UROBILINOGEN UR STRIP-ACNC: 0.2 E.U./DL
WBC #/AREA URNS AUTO: ABNORMAL /HPF
WBC CLUMPS #/AREA URNS HPF: PRESENT /HPF

## 2024-08-27 PROCEDURE — 87086 URINE CULTURE/COLONY COUNT: CPT | Performed by: FAMILY MEDICINE

## 2024-08-27 PROCEDURE — 81001 URINALYSIS AUTO W/SCOPE: CPT | Performed by: FAMILY MEDICINE

## 2024-08-27 PROCEDURE — 99213 OFFICE O/P EST LOW 20 MIN: CPT | Performed by: FAMILY MEDICINE

## 2024-08-27 PROCEDURE — 36415 COLL VENOUS BLD VENIPUNCTURE: CPT | Performed by: FAMILY MEDICINE

## 2024-08-27 PROCEDURE — 84443 ASSAY THYROID STIM HORMONE: CPT | Performed by: FAMILY MEDICINE

## 2024-08-27 PROCEDURE — 87186 SC STD MICRODIL/AGAR DIL: CPT | Performed by: FAMILY MEDICINE

## 2024-08-27 RX ORDER — NITROFURANTOIN 25; 75 MG/1; MG/1
100 CAPSULE ORAL 2 TIMES DAILY
Qty: 14 CAPSULE | Refills: 0 | Status: SHIPPED | OUTPATIENT
Start: 2024-08-27 | End: 2024-09-03

## 2024-08-27 NOTE — PROGRESS NOTES
"  Assessment & Plan     Burning with urination    - UA Macroscopic with reflex to Microscopic and Culture - Lab Collect  - Urine Microscopic Exam  - Urine Culture    S/P partial thyroidectomy  For specialist   - TSH with free T4 reflex    Postmenopausal status  On rx   - DX Bone Density; Future    Age-related osteoporosis without current pathological fracture  On rx   - DX Bone Density; Future    UTI   - nitroFURantoin macrocrystal-monohydrate (MACROBID) 100 MG capsule; Take 1 capsule (100 mg) by mouth 2 times daily for 7 days. Has had some symptoms on and off since surgery .          BMI  Estimated body mass index is 30.39 kg/m  as calculated from the following:    Height as of 7/24/24: 1.555 m (5' 1.22\").    Weight as of this encounter: 73.5 kg (162 lb).         If not improving or if worsening in the next 48 hours please call or be seen    Subjective   Mikala is a 83 year old, presenting for the following health issues:  Abdominal Pain        8/27/2024     8:42 AM   Additional Questions   Roomed by Chyna GALVIN CMA     History of Present Illness       Reason for visit:  Bladder  Symptom onset:  1-2 weeks ago  Symptoms include:  Pressure pain irritating  Symptom intensity:  Moderate  Symptom progression:  Staying the same  Had these symptoms before:  Yes   She is taking medications regularly.     Burning abdominal pain when she has to urinate.   Lower abdominal pain.   She is still having some lower pelvic pain and the urinary symptoms not any bowel symptoms she has had diverticulitis   She had the surgery last month and has done well other than the lower pelvic pain          [facility]  Outside Information      Contains abnormal data URINE CULTURE  Specimen: Urine - Urine specimen (specimen)  Component 2 yr ago   CULTURE RESULT Abnormal    CULTURE 50,000-100,000 CFU/mL Escherichia coli   Resulting Agency [facility]   Susceptibility    Organism " Antibiotic Susceptibility   Escherichia coli TRIMETHOPRIM/SULF <=1/19: S   Escherichia coli AMPICILLIN <=2: S   Escherichia coli CEFAZOLIN-UC <=4: S    Comment: Cefazolin-UC interpretations are for therapy of uncomplicated UTIs due to E.coli, K.pneumoniae, or P.mirablis.   Escherichia coli GENTAMICIN <=1: S   Escherichia coli CEFTRIAXONE <=1: S   Escherichia coli CEFTAZIDIME <=1: S   Escherichia coli LEVOFLOXACIN <=0.12: S   Escherichia coli CIPROFLOXACIN <=0.25: S   Escherichia coli AMPICILLIN/SULBACTAM <=2: S   Escherichia coli CEFEPIME <=1: S   Escherichia coli TOBRAMYCIN <=1: S   Escherichia coli MEROPENEM <=0.25: S   Escherichia coli NITROFURANTOIN <=16: S   Specimen Collected: 06/21/22 11:02 AM    Performed by: [facility]OR[facility]AT[facility] Last Resulted: 06/24/22  8:07 AM   [facility]  Result Received: 12/14/23 12:31 PM    View Encounter        Recent Data from [facility] [employer] [facility]  Related to URINE CULTURE  Component 10/27/22 06/21/22 06/21/22 07/27/21 02/22/21 02/02/21   CULTURE <10,000 CFU/mL multiple organisms RESULT Abnormal  50,000-100,000 CFU/mL Escherichia coli <10,000 CFU/mL multiple organisms <10,000 CFU/mL multiple organisms RESULT Abnormal    CULTURE <10,000 CFU/mL multiple organisms RESULT Abnormal  50,000-100,000 CFU/mL Escherichia coli <10,000 CFU/mL multiple organisms <10,000 CFU/mL multiple organisms RESULT Abnormal    View all related data               Review of Systems  Constitutional, HEENT, cardiovascular, pulmonary, gi and gu systems are negative, except as otherwise noted.      Objective    /62 (BP Location: Right arm, Patient Position: Sitting, Cuff Size: Adult Regular)   Pulse 78   Temp 98  F (36.7  C) (Tympanic)   Wt 73.5 kg (162 lb)   SpO2 94%   BMI 30.39 kg/m    Body mass index is 30.39 kg/m .  Physical Exam   GENERAL: alert and no distress  ABDOMEN: soft, nontender, no  hepatosplenomegaly, no masses and bowel sounds normal  NEURO: Normal strength and tone, mentation intact and speech normal  PSYCH: mentation appears normal, affect normal/bright    Results for orders placed or performed in visit on 08/27/24 (from the past 24 hour(s))   UA Macroscopic with reflex to Microscopic and Culture - Lab Collect    Specimen: Urine, Clean Catch   Result Value Ref Range    Color Urine Yellow Colorless, Straw, Light Yellow, Yellow    Appearance Urine Cloudy (A) Clear    Glucose Urine Negative Negative mg/dL    Bilirubin Urine Negative Negative    Ketones Urine Negative Negative mg/dL    Specific Gravity Urine >=1.030 1.003 - 1.035    Blood Urine Trace (A) Negative    pH Urine 5.5 5.0 - 7.0    Protein Albumin Urine Negative Negative mg/dL    Urobilinogen Urine 0.2 0.2, 1.0 E.U./dL    Nitrite Urine Positive (A) Negative    Leukocyte Esterase Urine Moderate (A) Negative   Urine Microscopic Exam   Result Value Ref Range    Bacteria Urine Few (A) None Seen /HPF    RBC Urine 2-5 (A) 0-2 /HPF /HPF    WBC Urine 25-50 (A) 0-5 /HPF /HPF    WBC Clumps Urine Present (A) None Seen /HPF           Signed Electronically by: Amparo Mays MD

## 2024-08-28 ENCOUNTER — TELEPHONE (OUTPATIENT)
Dept: SURGERY | Facility: CLINIC | Age: 84
End: 2024-08-28

## 2024-08-28 NOTE — TELEPHONE ENCOUNTER
"Melissa Dow RN  8/28/2024 11:15 AM CDT Back to Top      RN called pt with results. Message from  reviewed with patient. Pt voiced understanding and states \"thank her for me she did a great job\" No further questions or concerns per pt..Melissa Dow RN               vasoEryn clarke MD  [facility] [place]  Her thyroid number are perfect. Follow up with PCP        Ref Range & Units 1 d ago 1 mo ago 10 mo ago     TSH  0.30 - 4.20 uIU/mL 3.46 1.58 0.8     "

## 2024-08-29 ENCOUNTER — TELEPHONE (OUTPATIENT)
Dept: FAMILY MEDICINE | Facility: CLINIC | Age: 84
End: 2024-08-29
Payer: MEDICARE

## 2024-08-29 LAB
BACTERIA UR CULT: ABNORMAL
BACTERIA UR CULT: ABNORMAL

## 2024-08-29 NOTE — TELEPHONE ENCOUNTER
The sensitivity is not back yet. She has also only been on this for less than 2 days. When the sensitivity is back I can see what to change her antibiotics to. Amparo Mays M.D.

## 2024-08-29 NOTE — TELEPHONE ENCOUNTER
Patient's call transferred to author    Patient states she saw Dr. Mays early this week and was diagnosed with a UTI; patient was prescribed Macrobid   Patient states Dr. Mays had recommend to contact the Clinic if her symptoms did no improve a few days after being on the antibiotic     Patient states the symptoms are basically the same as the OV and is noticing increasing bladder pressure   Patient has been taking the antibiotic as prescribed     Wondering if a new antibiotic needs to be prescribed  Urine Culture is still preliminary     Okay for detailed voicemail on cell phone if no answer   [facility]  .[facility]

## 2024-08-30 NOTE — TELEPHONE ENCOUNTER
Call placed to patient   No answer; detailed voicemail left with Dr. Mays's message  Clinic RN call back [phone] provided if questions/concerns    Rich Mahan, Clinic RN  [facility]

## 2024-09-16 ENCOUNTER — PATIENT OUTREACH (OUTPATIENT)
Dept: CARE COORDINATION | Facility: CLINIC | Age: 84
End: 2024-09-16
Payer: MEDICARE

## 2024-09-16 NOTE — PROGRESS NOTES
Clinic Care Coordination Contact  [facility]/Voicemail    Clinical Data: Care Coordinator Outreach    Outreach Documentation Number of Outreach Attempt   9/16/2024  11:27 AM 1     Left message on patient's voicemail with call back information and requested return call.    ** Patient has been on CCC Maintenance since 7/12/24.    Plan: Care Coordinator will try to reach patient again in 10 business days.    Megan Lai  Community Health Worker  [facility] Care Coordination   [place], [place], [place], [facility]  Office: [phone]

## 2024-09-18 ENCOUNTER — LAB (OUTPATIENT)
Dept: LAB | Facility: CLINIC | Age: 84
End: 2024-09-18
Payer: MEDICARE

## 2024-09-18 ENCOUNTER — TELEPHONE (OUTPATIENT)
Dept: FAMILY MEDICINE | Facility: CLINIC | Age: 84
End: 2024-09-18

## 2024-09-18 ENCOUNTER — VIRTUAL VISIT (OUTPATIENT)
Dept: FAMILY MEDICINE | Facility: CLINIC | Age: 84
End: 2024-09-18
Payer: MEDICARE

## 2024-09-18 DIAGNOSIS — N39.0 RECURRENT UTI: Chronic | ICD-10-CM

## 2024-09-18 DIAGNOSIS — N39.0 RECURRENT UTI: Primary | ICD-10-CM

## 2024-09-18 LAB
ALBUMIN UR-MCNC: NEGATIVE MG/DL
APPEARANCE UR: CLEAR
BACTERIA #/AREA URNS HPF: ABNORMAL /HPF
BILIRUB UR QL STRIP: NEGATIVE
COLOR UR AUTO: YELLOW
GLUCOSE UR STRIP-MCNC: NEGATIVE MG/DL
HGB UR QL STRIP: ABNORMAL
HYALINE CASTS #/AREA URNS LPF: ABNORMAL /LPF
KETONES UR STRIP-MCNC: NEGATIVE MG/DL
LEUKOCYTE ESTERASE UR QL STRIP: NEGATIVE
NITRATE UR QL: NEGATIVE
PH UR STRIP: 5.5 [PH] (ref 5–7)
RBC #/AREA URNS AUTO: ABNORMAL /HPF
SP GR UR STRIP: 1.02 (ref 1–1.03)
SQUAMOUS #/AREA URNS AUTO: ABNORMAL /LPF
UROBILINOGEN UR STRIP-ACNC: 0.2 E.U./DL
WBC #/AREA URNS AUTO: ABNORMAL /HPF

## 2024-09-18 PROCEDURE — 99441 PR PHYSICIAN TELEPHONE EVALUATION 5-10 MIN: CPT | Mod: 93 | Performed by: FAMILY MEDICINE

## 2024-09-18 PROCEDURE — 87086 URINE CULTURE/COLONY COUNT: CPT

## 2024-09-18 PROCEDURE — 81001 URINALYSIS AUTO W/SCOPE: CPT | Mod: 93 | Performed by: FAMILY MEDICINE

## 2024-09-18 NOTE — PROGRESS NOTES
"Mikala is a 83 year old who is being evaluated via a billable telephone visit.    What phone number would you like to be contacted at? [phone]  How would you like to obtain your AVS? Shahbaz  Originating Location (pt. Location): Home    Distant Location (provider location):  Off-site    Assessment & Plan     Recurrent UTI  Recent e coli UTI treated with macrobid with improved but not resolved sx now getting worse.  Will leave urine sample today and will treat as indicated. Allergies to sulfa, keflex, noted.  Cipro likely next best medication to try.    - REVIEW OF HEALTH MAINTENANCE PROTOCOL ORDERS  - UA with Microscopic reflex to Culture - Clinic Collect  - Urine Culture Aerobic Bacterial - lab collect            BMI  Estimated body mass index is 30.39 kg/m  as calculated from the following:    Height as of 7/24/24: 1.555 m (5' 1.22\").    Weight as of 8/27/24: 73.5 kg (162 lb).         Subjective   Mikala is a 83 year old, presenting for the following health issues:  UTI (Still have UTI )        9/18/2024    11:20 AM   Additional Questions   Roomed by hser   Accompanied by self     History of Present Illness       Reason for visit:  Uti    She eats 2-3 servings of fruits and vegetables daily.She consumes 1 sweetened beverage(s) daily.She exercises with enough effort to increase her heart rate 10 to 19 minutes per day.  She exercises with enough effort to increase her heart rate 3 or less days per week.   She is taking medications regularly.     Reviewed chart- pt called today with UTI sx.  UTI 8/27/24.  Ongoing pain with urination.  No hematuria.  No abd/back pain. Sx worse at night.  Not able to fully empty bladder.  Feeling bloated.  Loose stools. No black/bloody stools.  No n/v.  Advised to follow-up with clinic to review.      Urine cx 8/27/24 e coli pan sensitive.  Treated with macrobid for 7 days.  Noted that her symptoms were better but not resolved and now starting to come back again.      Had surgery this " summer and did have a catheter at that time.      Pt notes her main problem is at night.  Burning with urination after peeing.  Mostly at night if she has been holding for a while.  During the day she can void quickly as needed.  Nocturia 3-4x/night.  No hematuria.  Some abd bloating and abd discomfort. Feels similar to before taking her UTI meds.  Wondering if she needs to get in to see her urology     Urologist- [facility].  Mikayla   Has h/o normal ua but when the urine culture is run it comes back with bacteria.      Prefers cell phone for messages.          Objective    Vitals - Patient Reported  Systolic (Patient Reported):  (unable to)  Diastolic (Patient Reported):  (unable to)  Weight (Patient Reported):  (unable to)  Height (Patient Reported):  (unable to)  SpO2 (Patient Reported):  (unable to)  Temperature (Patient Reported):  (unable to)  Pulse (Patient Reported):  (unable to)  Pain Score: No Pain (0)  Pain Loc: Other - see comment        Physical Exam   General: Alert and no distress //Respiratory: No audible wheeze, cough, or shortness of breath // Psychiatric:  Appropriate affect, tone, and pace of words            Phone call duration: 8 minutes  Signed Electronically by: Tracy Fernandez MD    Prior to immunization administration, verified patients identity using patient s name and date of birth. Please see Immunization Activity for additional information.     Screening Questionnaire for Adult Immunization    Are you sick today?   No   Do you have allergies to medications, food, a vaccine component or latex?   Yes   Have you ever had a serious reaction after receiving a vaccination?   No   Do you have a long-term health problem with heart, lung, kidney, or metabolic disease (e.g., diabetes), asthma, a blood disorder, no spleen, complement component deficiency, a cochlear implant, or a spinal fluid leak?  Are you on long-term aspirin therapy?   Yes   Do you have cancer, leukemia, HIV/AIDS, or any other  immune system problem?   No   Do you have a parent, brother, or sister with an immune system problem?   No   In the past 3 months, have you taken medications that affect  your immune system, such as prednisone, other steroids, or anticancer drugs; drugs for the treatment of rheumatoid arthritis, Crohn s disease, or psoriasis; or have you had radiation treatments?   No   Have you had a seizure, or a brain or other nervous system problem?   No   During the past year, have you received a transfusion of blood or blood    products, or been given immune (gamma) globulin or antiviral drug?   No   For women: Are you pregnant or is there a chance you could become       pregnant during the next month?   No   Have you received any vaccinations in the past 4 weeks?   No     Immunization questionnaire was positive for at least one answer.  Notified provider.      Patient instructed to remain in clinic for 15 minutes afterwards, and to report any adverse reactions.     Screening performed by Venu Hua MA on 9/18/2024 at 11:23 AM.

## 2024-09-18 NOTE — TELEPHONE ENCOUNTER
S-(situation): the patient believes she may still have UTI.  She would like repeat urine test to see.    B-(background): the patient was seen in the clinic on 8/27/24 for UTI.    A-(assessment): the patient states she continues to have painful urination.  The patient has pain after she starts urinating and after for a little while.  The patient does not see any blood in the urine.  The patient denies any fever or chills.  The patient denies any pain in the back or abdomen pain while resting or moving.  The patient states at night it is worse. The patient states she feels she is not emptying her bladder.  However, during the day she does not feel this way. The patient states she is bloated. She is having loose stools.  No blood in stool or no pain with bowel movements. The patient denies any vomiting or nausea.      R-(recommendations): will send to provider to review. Ok to place order for urine or would you prefer her to be seen?    Thank you    Viktoriya SOUZA RN

## 2024-09-20 LAB — BACTERIA UR CULT: NORMAL

## 2024-09-25 ENCOUNTER — PATIENT OUTREACH (OUTPATIENT)
Dept: CARE COORDINATION | Facility: CLINIC | Age: 84
End: 2024-09-25
Payer: MEDICARE

## 2024-09-25 NOTE — PROGRESS NOTES
Clinic Care Coordination Contact  Community Health Worker Follow Up    Care Gaps:     Health Maintenance Due   Topic Date Due    MAMMO SCREENING  05/11/2018    INFLUENZA VACCINE (1) 09/01/2024    COVID-19 Vaccine (7 - 2024-25 season) 09/01/2024    MEDICARE ANNUAL WELLNESS VISIT  10/13/2024       Scheduled 10/22/24 for AWV with Ally Jeffrey      Care Plan:   Care Plan: Transportation Completed 7/15/2024      Problem: Lack of transportation  Resolved 7/15/2024      Goal: I have accomplished establishing reliable transportation  Completed 7/12/2024      Start Date: 4/12/2024    This Visit's Progress: 100% Recent Progress: 30%    Note:     Personal Plan    My brother will be giving me a ride to and from my surgery.                               Intervention and Education during outreach: Patients  is having medical problems and depression. Patient is feeling overwhelmed and unsure what to do next to help her . Patient will help her  make an appointment with his PCP and Neurologist. Patient asked for CHW to follow up with her in about 2 weeks.     ** Patient has been on CCC Maintenance since 7/12/24.    CHW Plan: CHW will follow up with patient in about 2 weeks and either creat a new goal at that time or send to CCC RN for graduation.    Megan Lai  Community Health Worker  [facility]  Clinic Care Coordination   [place], [facility], [place], [place], [place] and [facility]  Office: [phone]

## 2024-09-30 ENCOUNTER — HOSPITAL ENCOUNTER (OUTPATIENT)
Dept: BONE DENSITY | Facility: HOSPITAL | Age: 84
Discharge: HOME | End: 2024-09-30
Attending: FAMILY MEDICINE | Admitting: FAMILY MEDICINE
Payer: MEDICARE

## 2024-09-30 DIAGNOSIS — Z78.0 POSTMENOPAUSAL STATUS: ICD-10-CM

## 2024-09-30 DIAGNOSIS — M81.0 AGE-RELATED OSTEOPOROSIS WITHOUT CURRENT PATHOLOGICAL FRACTURE: ICD-10-CM

## 2024-09-30 PROCEDURE — 77080 DXA BONE DENSITY AXIAL: CPT

## 2024-10-11 ENCOUNTER — PATIENT OUTREACH (OUTPATIENT)
Dept: CARE COORDINATION | Facility: CLINIC | Age: 84
End: 2024-10-11
Payer: MEDICARE

## 2024-10-11 NOTE — PROGRESS NOTES
Clinic Care Coordination Contact  Community Health Worker Follow Up    Care Gaps:     Health Maintenance Due   Topic Date Due    MAMMO SCREENING  05/11/2018    INFLUENZA VACCINE (1) 09/01/2024    COVID-19 Vaccine (7 - 2024-25 season) 09/01/2024    MEDICARE ANNUAL WELLNESS VISIT  10/13/2024    LIPID  11/02/2024       Scheduled 10/22/24 for AWV with Ally Jeffrey      Care Plan:   Care Plan: Transportation Completed 7/15/2024      Problem: Lack of transportation  Resolved 7/15/2024      Goal: I have accomplished establishing reliable transportation  Completed 7/12/2024      Start Date: 4/12/2024    This Visit's Progress: 100% Recent Progress: 30%    Note:     Personal Plan    My brother will be giving me a ride to and from my surgery.                               Intervention and Education during outreach: Patient let CHW know that they are meeting with Hospice today for her . This decision to meet with Hospice was made yesterday 10/10/24. Patient was not able to think of anything more that she was needing at this time and asked for CHW to follow up again in about 1 month.     CHW Plan: CHW will follow up with patient in about 1 month.     Megan Lai  Community Health Worker  [facility]  Clinic Care Coordination   [facility], [place], [place], [place] and [facility]  Office: [phone]

## 2024-10-19 SDOH — HEALTH STABILITY: PHYSICAL HEALTH: ON AVERAGE, HOW MANY DAYS PER WEEK DO YOU ENGAGE IN MODERATE TO STRENUOUS EXERCISE (LIKE A BRISK WALK)?: 7 DAYS

## 2024-10-19 SDOH — HEALTH STABILITY: PHYSICAL HEALTH: ON AVERAGE, HOW MANY MINUTES DO YOU ENGAGE IN EXERCISE AT THIS LEVEL?: 30 MIN

## 2024-10-21 ASSESSMENT — PATIENT HEALTH QUESTIONNAIRE - PHQ9
10. IF YOU CHECKED OFF ANY PROBLEMS, HOW DIFFICULT HAVE THESE PROBLEMS MADE IT FOR YOU TO DO YOUR WORK, TAKE CARE OF THINGS AT HOME, OR GET ALONG WITH OTHER PEOPLE: NOT DIFFICULT AT ALL
SUM OF ALL RESPONSES TO PHQ QUESTIONS 1-9: 2
SUM OF ALL RESPONSES TO PHQ QUESTIONS 1-9: 2

## 2024-10-22 ENCOUNTER — OFFICE VISIT (OUTPATIENT)
Dept: FAMILY MEDICINE | Facility: CLINIC | Age: 84
End: 2024-10-22
Payer: MEDICARE

## 2024-10-22 VITALS
HEART RATE: 75 BPM | BODY MASS INDEX: 30.21 KG/M2 | TEMPERATURE: 97.6 F | OXYGEN SATURATION: 95 % | DIASTOLIC BLOOD PRESSURE: 70 MMHG | WEIGHT: 160 LBS | SYSTOLIC BLOOD PRESSURE: 122 MMHG | HEIGHT: 61 IN

## 2024-10-22 DIAGNOSIS — K21.9 GASTROESOPHAGEAL REFLUX DISEASE WITHOUT ESOPHAGITIS: ICD-10-CM

## 2024-10-22 DIAGNOSIS — R73.9 ELEVATED RANDOM BLOOD GLUCOSE LEVEL: ICD-10-CM

## 2024-10-22 DIAGNOSIS — E04.1 THYROID NODULE: ICD-10-CM

## 2024-10-22 DIAGNOSIS — F33.0 DEPRESSION, MAJOR, RECURRENT, MILD (H): ICD-10-CM

## 2024-10-22 DIAGNOSIS — M81.0 OSTEOPOROSIS, SENILE: ICD-10-CM

## 2024-10-22 DIAGNOSIS — E78.00 HYPERCHOLESTEREMIA: ICD-10-CM

## 2024-10-22 DIAGNOSIS — Z00.00 ENCOUNTER FOR MEDICARE ANNUAL WELLNESS EXAM: Primary | ICD-10-CM

## 2024-10-22 LAB
CHOLEST SERPL-MCNC: 196 MG/DL
EST. AVERAGE GLUCOSE BLD GHB EST-MCNC: 120 MG/DL
FASTING STATUS PATIENT QL REPORTED: YES
HBA1C MFR BLD: 5.8 % (ref 0–5.6)
HDLC SERPL-MCNC: 70 MG/DL
LDLC SERPL CALC-MCNC: 109 MG/DL
NONHDLC SERPL-MCNC: 126 MG/DL
TRIGL SERPL-MCNC: 87 MG/DL
VIT D+METAB SERPL-MCNC: 54 NG/ML (ref 20–50)

## 2024-10-22 PROCEDURE — G0439 PPPS, SUBSEQ VISIT: HCPCS | Performed by: PHYSICIAN ASSISTANT

## 2024-10-22 PROCEDURE — 80061 LIPID PANEL: CPT | Performed by: PHYSICIAN ASSISTANT

## 2024-10-22 PROCEDURE — 36415 COLL VENOUS BLD VENIPUNCTURE: CPT | Performed by: PHYSICIAN ASSISTANT

## 2024-10-22 PROCEDURE — 82306 VITAMIN D 25 HYDROXY: CPT | Performed by: PHYSICIAN ASSISTANT

## 2024-10-22 PROCEDURE — 84443 ASSAY THYROID STIM HORMONE: CPT | Performed by: PHYSICIAN ASSISTANT

## 2024-10-22 PROCEDURE — 83036 HEMOGLOBIN GLYCOSYLATED A1C: CPT | Performed by: PHYSICIAN ASSISTANT

## 2024-10-22 PROCEDURE — 99214 OFFICE O/P EST MOD 30 MIN: CPT | Mod: 25 | Performed by: PHYSICIAN ASSISTANT

## 2024-10-22 RX ORDER — ESCITALOPRAM OXALATE 10 MG/1
10 TABLET ORAL DAILY
Qty: 90 TABLET | Refills: 3 | Status: SHIPPED | OUTPATIENT
Start: 2024-10-22

## 2024-10-22 RX ORDER — FAMOTIDINE 20 MG/1
20 TABLET, FILM COATED ORAL 2 TIMES DAILY
Qty: 90 TABLET | Refills: 1 | Status: SHIPPED | OUTPATIENT
Start: 2024-10-22

## 2024-10-22 RX ORDER — ALENDRONATE SODIUM 70 MG/1
70 TABLET ORAL
Qty: 12 TABLET | Refills: 3 | Status: SHIPPED | OUTPATIENT
Start: 2024-10-22

## 2024-10-22 NOTE — PATIENT INSTRUCTIONS
Patient Education   Preventive Care Advice   This is general advice given by our system to help you stay healthy. However, your care team may have specific advice just for you. Please talk to your care team about your preventive care needs.  Nutrition  Eat 5 or more servings of fruits and vegetables each day.  Try wheat bread, brown rice and whole grain pasta (instead of white bread, rice, and pasta).  Get enough calcium and vitamin D. Check the label on foods and aim for 100% of the RDA (recommended daily allowance).  Lifestyle  Exercise at least 150 minutes each week  (30 minutes a day, 5 days a week).  Do muscle strengthening activities 2 days a week. These help control your weight and prevent disease.  No smoking.  Wear sunscreen to prevent skin cancer.  Have a dental exam and cleaning every 6 months.  Yearly exams  See your health care team every year to talk about:  Any changes in your health.  Any medicines your care team has prescribed.  Preventive care, family planning, and ways to prevent chronic diseases.  Shots (vaccines)   HPV shots (up to age 26), if you've never had them before.  Hepatitis B shots (up to age 59), if you've never had them before.  COVID-19 shot: Get this shot when it's due.  Flu shot: Get a flu shot every year.  Tetanus shot: Get a tetanus shot every 10 years.  Pneumococcal, hepatitis A, and RSV shots: Ask your care team if you need these based on your risk.  Shingles shot (for age 50 and up)  General health tests  Diabetes screening:  Starting at age 35, Get screened for diabetes at least every 3 years.  If you are younger than age 35, ask your care team if you should be screened for diabetes.  Cholesterol test: At age 39, start having a cholesterol test every 5 years, or more often if advised.  Bone density scan (DEXA): At age 50, ask your care team if you should have this scan for osteoporosis (brittle bones).  Hepatitis C: Get tested at least once in your life.  STIs (sexually  transmitted infections)  Before age 24: Ask your care team if you should be screened for STIs.  After age 24: Get screened for STIs if you're at risk. You are at risk for STIs (including HIV) if:  You are sexually active with more than one person.  You don't use condoms every time.  You or a partner was diagnosed with a sexually transmitted infection.  If you are at risk for HIV, ask about PrEP medicine to prevent HIV.  Get tested for HIV at least once in your life, whether you are at risk for HIV or not.  Cancer screening tests  Cervical cancer screening: If you have a cervix, begin getting regular cervical cancer screening tests starting at age 21.  Breast cancer scan (mammogram): If you've ever had breasts, begin having regular mammograms starting at age 40. This is a scan to check for breast cancer.  Colon cancer screening: It is important to start screening for colon cancer at age 45.  Have a colonoscopy test every 10 years (or more often if you're at risk) Or, ask your provider about stool tests like a FIT test every year or Cologuard test every 3 years.  To learn more about your testing options, visit:   .  For help making a decision, visit:   https://bit.ly/ll24892.  Prostate cancer screening test: If you have a prostate, ask your care team if a prostate cancer screening test (PSA) at age 55 is right for you.  Lung cancer screening: If you are a current or former smoker ages 50 to 80, ask your care team if ongoing lung cancer screenings are right for you.  For informational purposes only. Not to replace the advice of your health care provider. Copyright   2023 [facility] [employer]. All rights reserved. Clinically reviewed by the [facility]. IM5 [id number] - REV 01/24.  Hearing Loss: Care Instructions  Overview     Hearing loss is a sudden or slow decrease in how well you hear. It can range from slight to profound. Permanent hearing loss can occur with aging. It also can  happen when you are exposed long-term to loud noise. Examples include listening to loud music, riding motorcycles, or being around other loud machines.  Hearing loss can affect your work and home life. It can make you feel lonely or depressed. You may feel that you have lost your independence. But hearing aids and other devices can help you hear better and feel connected to others.  Follow-up care is a key part of your treatment and safety. Be sure to make and go to all appointments, and call your doctor if you are having problems. It's also a good idea to know your test results and keep a list of the medicines you take.  How can you care for yourself at home?  Avoid loud noises whenever possible. This helps keep your hearing from getting worse.  Always wear hearing protection around loud noises.  Wear a hearing aid as directed.  A professional can help you pick a hearing aid that will work best for you.  You can also get hearing aids over the counter for mild to moderate hearing loss.  Have hearing tests as your doctor suggests. They can show whether your hearing has changed. Your hearing aid may need to be adjusted.  Use other devices as needed. These may include:  Telephone amplifiers and hearing aids that can connect to a television, stereo, radio, or microphone.  Devices that use lights or vibrations. These alert you to the doorbell, a ringing telephone, or a baby monitor.  Television closed-captioning. This shows the words at the bottom of the screen. Most new TVs can do this.  TTY (text telephone). This lets you type messages back and forth on the telephone instead of talking or listening. These devices are also called TDD. When messages are typed on the keyboard, they are sent over the phone line to a receiving TTY. The message is shown on a monitor.  Use text messaging, social media, and email if it is hard for you to communicate by telephone.  Try to learn a listening technique called speechreading. It is  "not lipreading. You pay attention to people's gestures, expressions, posture, and tone of voice. These clues can help you understand what a person is saying. Face the person you are talking to, and have them face you. Make sure the lighting is good. You need to see the other person's face clearly.  Think about counseling if you need help to adjust to your hearing loss.  When should you call for help?  Watch closely for changes in your health, and be sure to contact your doctor if:    You think your hearing is getting worse.     You have new symptoms, such as dizziness or nausea.   Where can you learn more?  Go to https://www.Dip[employer].net/patiented  Enter R798 in the search box to learn more about \"Hearing Loss: Care Instructions.\"  Current as of: September 27, 2023  Content Version: 14.2    2024 [employer].   Care instructions adapted under license by your healthcare professional. If you have questions about a medical condition or this instruction, always ask your healthcare professional. [employer] disclaims any warranty or liability for your use of this information.    Learning About Stress  What is stress?     Stress is your body's response to a hard situation. Your body can have a physical, emotional, or mental response. Stress is a fact of life for most people, and it affects everyone differently. What causes stress for you may not be stressful for someone else.  A lot of things can cause stress. You may feel stress when you go on a job interview, take a test, or run a race. This kind of short-term stress is normal and even useful. It can help you if you need to work hard or react quickly. For example, stress can help you finish an important job on time.  Long-term stress is caused by ongoing stressful situations or events. Examples of long-term stress include long-term health problems, ongoing problems at work, or conflicts in your family. Long-term stress can harm your health.  How " does stress affect your health?  When you are stressed, your body responds as though you are in danger. It makes hormones that speed up your heart, make you breathe faster, and give you a burst of energy. This is called the fight-or-flight stress response. If the stress is over quickly, your body goes back to normal and no harm is done.  But if stress happens too often or lasts too long, it can have bad effects. Long-term stress can make you more likely to get sick, and it can make symptoms of some diseases worse. If you tense up when you are stressed, you may develop neck, shoulder, or low back pain. Stress is linked to high blood pressure and heart disease.  Stress also harms your emotional health. It can make you lopez, tense, or depressed. Your relationships may suffer, and you may not do well at work or school.  What can you do to manage stress?  You can try these things to help manage stress:   Do something active. Exercise or activity can help reduce stress. Walking is a great way to get started. Even everyday activities such as housecleaning or yard work can help.  Try yoga or ilia chi. These techniques combine exercise and meditation. You may need some training at first to learn them.  Do something you enjoy. For example, listen to music or go to a movie. Practice your hobby or do volunteer work.  Meditate. This can help you relax, because you are not worrying about what happened before or what may happen in the future.  Do guided imagery. Imagine yourself in any setting that helps you feel calm. You can use online videos, books, or a teacher to guide you.  Do breathing exercises. For example:  From a standing position, bend forward from the waist with your knees slightly bent. Let your arms dangle close to the floor.  Breathe in slowly and deeply as you return to a standing position. Roll up slowly and lift your head last.  Hold your breath for just a few seconds in the standing position.  Breathe out  "slowly and bend forward from the waist.  Let your feelings out. Talk, laugh, cry, and express anger when you need to. Talking with supportive friends or family, a counselor, or a jose m leader about your feelings is a healthy way to relieve stress. Avoid discussing your feelings with people who make you feel worse.  Write. It may help to write about things that are bothering you. This helps you find out how much stress you feel and what is causing it. When you know this, you can find better ways to cope.  What can you do to prevent stress?  You might try some of these things to help prevent stress:  Manage your time. This helps you find time to do the things you want and need to do.  Get enough sleep. Your body recovers from the stresses of the day while you are sleeping.  Get support. Your family, friends, and community can make a difference in how you experience stress.  Limit your news feed. Avoid or limit time on social media or news that may make you feel stressed.  Do something active. Exercise or activity can help reduce stress. Walking is a great way to get started.  Where can you learn more?  Go to https://www.[employer].net/patiented  Enter N032 in the search box to learn more about \"Learning About Stress.\"  Current as of: October 24, 2023  Content Version: 14.2 2024 [facility] [employer].   Care instructions adapted under license by your healthcare professional. If you have questions about a medical condition or this instruction, always ask your healthcare professional. [employer] disclaims any warranty or liability for your use of this information.       "

## 2024-10-22 NOTE — PROGRESS NOTES
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 Discussed this with her today and noted that she could make that change if she needed and would just want her to let me know so we could reflect that change in her med list  Plan: escitalopram (LEXAPRO) 10 MG tablet            (R73.9) Elevated random blood glucose level  Comment: repeat labs today  Plan: Hemoglobin A1c            (M81.0) Osteoporosis, senile  Comment: on fosamax. Last DEXA in Sept with stable osteopenia  Plan: Vitamin D Deficiency, alendronate (FOSAMAX) 70         MG tablet            (K21.9) Gastroesophageal reflux disease without esophagitis  Comment: has been using omeprazole prn = does not need daily. Discussed trying famotidine prn vs omeprazole for bone health  Plan: famotidine (PEPCID) 20 MG tablet                Patient has been advised of split billing requirements and indicates understanding: Yes        BMI  Estimated body mass index is 30.01 kg/m  as calculated from the following:    Height as of this encounter: 1.555 m (5' 1.22\").    Weight as of this encounter: 72.6 kg (160 lb).       Counseling  Appropriate preventive services were addressed with this patient via screening, questionnaire, or discussion as appropriate for fall prevention, nutrition, physical activity, Tobacco-use cessation, social engagement, weight loss and cognition.  Checklist reviewing preventive services available has been given to the patient.  Reviewed patient's diet, addressing concerns and/or " "questions.   The patient was provided with written information regarding signs of hearing loss.       Subjective   Mikala is a 84 year old, presenting for the following:  Physical        10/22/2024    10:03 AM   Additional Questions   Roomed by RADHA Collazo     Health Care Directive  Patient does not have a Health Care Directive or Living Will: Patient states has Advance Directive and will bring in a copy to clinic.    HPI    Overall she is doing well   She is in a stressful situation with her  but has come to the realization that she can't control the situation. Her biggest worry is not him (she realizes she can't make him think differently) but is that if something happens to her, there will be no one for him as he has no family  She has been in difficult relationships in the past - her prior  was an alcoholic and she had to learn that she could not change him. This was in the 80's and at that time admits before she came to that realization she tried to do to much and ended up having a 'breakdown\" that put her in the psych [facility]  She recognizes those feelings and also has better skills to manage how she is feeling   Now if she is feeling overwhelmed she will walk away from the situation - she will often go down to the garage and sit in the car and play a playlist with her favorite songs that help her relax ([employer] or the muppet song). She used to go down to the car and scream but then someone else in the garage came rushing over thinking something was wrong so now she doesn't scream anymore.           10/19/2024   General Health   How would you rate your overall physical health? Good   Feel stress (tense, anxious, or unable to sleep) Rather much      (!) STRESS CONCERN      10/19/2024   Nutrition   Diet: Regular (no restrictions)            10/19/2024   Exercise   Days per week of moderate/strenous exercise 7 days   Average minutes spent exercising at this level 30 min            10/19/2024   Social Factors "   Worry food won't last until get money to buy more No   Food not last or not have enough money for food? No   Do you have housing? (Housing is defined as stable permanent housing and does not include staying ouside in a car, in a tent, in an abandoned building, in an overnight shelter, or couch-surfing.) Yes   Are you worried about losing your housing? No   Lack of transportation? No   Unable to get utilities (heat,electricity)? No            10/19/2024   Fall Risk   Fallen 2 or more times in the past year? No   Trouble with walking or balance? No             10/19/2024   Activities of Daily Living- Home Safety   Needs help with the following daily activites None of the above   Safety concerns in the home None of the above            10/19/2024   Dental   Dentist two times every year? Yes            10/19/2024   Hearing Screening   Hearing concerns? (!) TROUBLE UNDERSTANDING SOFT OR WHISPERED SPEECH.            10/19/2024   Driving Risk Screening   Patient/family members have concerns about driving No            10/19/2024   General Alertness/Fatigue Screening   Have you been more tired than usual lately? No            10/19/2024   Urinary Incontinence Screening   Bothered by leaking urine in past 6 months No            10/19/2024   TB Screening   Were you born outside of the [place]? No          Today's PHQ-9 Score:       10/21/2024     3:07 PM   PHQ-9 SCORE   PHQ-9 Total Score MyChart 2 (Minimal depression)   PHQ-9 Total Score 2         10/19/2024   Substance Use   Alcohol more than 3/day or more than 7/wk No   Do you have a current opioid prescription? No   How severe/bad is pain from 1 to 10? 0/10 (No Pain)   Do you use any other substances recreationally? No        Social History     Tobacco Use    Smoking status: Former     Current packs/day: 0.00     Average packs/day: 0.5 packs/day for 40.0 years (20.0 ttl pk-yrs)     Types: Cigarettes     Start date: 1/30/1965     Quit date: 1/30/2005     Years since quitting:  19.7    Smokeless tobacco: Never   Vaping Use    Vaping status: Never Used   Substance Use Topics    Alcohol use: No     Comment: Alcoholic Drinks/day: occassionaly     Drug use: No       Mammogram Screening - After age 74- determine frequency with patient based on health status, life expectancy and patient goals        Reviewed and updated as needed this visit by Provider                    BP Readings from Last 3 Encounters:   10/22/24 122/70   08/27/24 106/62   07/24/24 133/85    Wt Readings from Last 3 Encounters:   10/22/24 72.6 kg (160 lb)   08/27/24 73.5 kg (162 lb)   07/24/24 71.8 kg (158 lb 4.6 oz)                  Current providers sharing in care for this patient include:  Patient Care Team:  Amparo Mays MD as PCP - General (Family Medicine)  Janine Barrera MD as MD (Hematology & Oncology)  Geo Fong MD as MD (Ophthalmology)  Jamison Wallace MD as Assigned Endocrinology Provider  Megan Lai CHW as Community Health Worker  Khadra Lincoln RN as Lead Care Coordinator (Primary Care - CC)  Eryn Jose MD as MD (Surgery)  Eryn Jose MD as Assigned Surgical Provider  Amparo Mays MD as Assigned PCP    The following health maintenance items are reviewed in Epic and correct as of today:  Health Maintenance   Topic Date Due    MAMMO SCREENING  05/11/2018    LIPID  11/02/2024    DTAP/TDAP/TD IMMUNIZATION (2 - Td or Tdap) 01/22/2025    PHQ-9  04/22/2025    ANNUAL REVIEW OF HM ORDERS  09/18/2025    MEDICARE ANNUAL WELLNESS VISIT  10/22/2025    FALL RISK ASSESSMENT  10/22/2025    ADVANCE CARE PLANNING  07/23/2029    DEXA  09/30/2039    SPIROMETRY  Completed    COPD ACTION PLAN  Completed    DEPRESSION ACTION PLAN  Completed    INFLUENZA VACCINE  Completed    Pneumococcal Vaccine: 65+ Years  Completed    ZOSTER IMMUNIZATION  Completed    RSV VACCINE  Completed    COVID-19 Vaccine  Completed    HPV IMMUNIZATION  Aged Out    MENINGITIS IMMUNIZATION  Aged Out     "RSV MONOCLONAL ANTIBODY  Aged Out    LUNG CANCER SCREENING  Discontinued         Review of Systems  CONSTITUTIONAL: NEGATIVE for fever, chills, change in weight  INTEGUMENTARY/SKIN: NEGATIVE for worrisome rashes, moles or lesions  EYES: NEGATIVE for vision changes or irritation  ENT/MOUTH: NEGATIVE for ear, mouth and throat problems  RESP: NEGATIVE for significant cough or SOB  BREAST: NEGATIVE for masses, tenderness or discharge  CV: NEGATIVE for chest pain, palpitations or peripheral edema  GI: NEGATIVE for nausea, abdominal pain, heartburn, or change in bowel habits  : NEGATIVE for frequency, dysuria, or hematuria  MUSCULOSKELETAL: NEGATIVE for significant arthralgias or myalgia  NEURO: NEGATIVE for weakness, dizziness or paresthesias  ENDOCRINE: NEGATIVE for temperature intolerance, skin/hair changes  HEME: NEGATIVE for bleeding problems  PSYCHIATRIC: NEGATIVE for changes in mood or affect     Objective    Exam  /70   Pulse 75   Temp 97.6  F (36.4  C) (Tympanic)   Ht 1.555 m (5' 1.22\")   Wt 72.6 kg (160 lb)   LMP  (LMP Unknown)   SpO2 95%   BMI 30.01 kg/m     Estimated body mass index is 30.01 kg/m  as calculated from the following:    Height as of this encounter: 1.555 m (5' 1.22\").    Weight as of this encounter: 72.6 kg (160 lb).    Physical Exam  GENERAL: alert and no distress  EYES: Eyes grossly normal to inspection, PERRL and conjunctivae and sclerae normal  HENT: ear canals and TM's normal, nose and mouth without ulcers or lesions  NECK: no adenopathy, no asymmetry, masses, or scars  RESP: lungs clear to auscultation - no rales, rhonchi or wheezes  CV: regular rate and rhythm, normal S1 S2, no S3 or S4, no murmur, click or rub, no peripheral edema  ABDOMEN: soft, nontender, no hepatosplenomegaly, no masses and bowel sounds normal  MS: no gross musculoskeletal defects noted, no edema  SKIN: no suspicious lesions or rashes  NEURO: Normal strength and tone, mentation intact and speech " normal  PSYCH: mentation appears normal, affect normal/bright         10/22/2024   Mini Cog   Clock Draw Score 2 Normal   3 Item Recall 3 objects recalled   Mini Cog Total Score 5               Signed Electronically by: Ally Jeffrey PA-C    Answers submitted by the patient for this visit:  Patient Health Questionnaire (Submitted on 10/21/2024)  If you checked off any problems, how difficult have these problems made it for you to do your work, take care of things at home, or get along with other people?: Not difficult at all  PHQ9 TOTAL SCORE: 2

## 2024-10-24 ENCOUNTER — OFFICE VISIT (OUTPATIENT)
Dept: PULMONOLOGY | Facility: CLINIC | Age: 84
End: 2024-10-24
Attending: INTERNAL MEDICINE
Payer: MEDICARE

## 2024-10-24 VITALS
WEIGHT: 160.6 LBS | DIASTOLIC BLOOD PRESSURE: 68 MMHG | SYSTOLIC BLOOD PRESSURE: 126 MMHG | BODY MASS INDEX: 29.55 KG/M2 | HEIGHT: 62 IN | HEART RATE: 69 BPM | OXYGEN SATURATION: 96 %

## 2024-10-24 DIAGNOSIS — J44.9 COPD (CHRONIC OBSTRUCTIVE PULMONARY DISEASE) (H): ICD-10-CM

## 2024-10-24 DIAGNOSIS — J44.9 CHRONIC OBSTRUCTIVE PULMONARY DISEASE, UNSPECIFIED COPD TYPE (H): Primary | ICD-10-CM

## 2024-10-24 LAB — HGB BLD-MCNC: 13.5 G/DL

## 2024-10-24 PROCEDURE — 99204 OFFICE O/P NEW MOD 45 MIN: CPT | Performed by: INTERNAL MEDICINE

## 2024-10-24 PROCEDURE — 85018 HEMOGLOBIN: CPT | Mod: QW

## 2024-10-24 PROCEDURE — 94375 RESPIRATORY FLOW VOLUME LOOP: CPT | Mod: 26 | Performed by: INTERNAL MEDICINE

## 2024-10-24 PROCEDURE — 94726 PLETHYSMOGRAPHY LUNG VOLUMES: CPT | Mod: 26 | Performed by: INTERNAL MEDICINE

## 2024-10-24 PROCEDURE — 99207 PR NO BILLABLE SERVICE THIS VISIT: CPT | Performed by: INTERNAL MEDICINE

## 2024-10-24 PROCEDURE — 94729 DIFFUSING CAPACITY: CPT | Mod: 26 | Performed by: INTERNAL MEDICINE

## 2024-10-24 RX ORDER — TIOTROPIUM BROMIDE AND OLODATEROL 3.124; 2.736 UG/1; UG/1
2 SPRAY, METERED RESPIRATORY (INHALATION) DAILY
Qty: 4 G | Refills: 11 | Status: SHIPPED | OUTPATIENT
Start: 2024-10-24 | End: 2024-10-29

## 2024-10-24 RX ORDER — SOY PROTEIN
POWDER (GRAM) ORAL
COMMUNITY

## 2024-10-24 NOTE — PATIENT INSTRUCTIONS
[place] [phone] M-F  Stiolto 2.5-2.5mg is the new medication. Try this if affordable instead of the trelegy. If you prefer the trelegy

## 2024-10-24 NOTE — PROGRESS NOTES
Pulmonary Clinic Consultation    Assessment:  84yoF with COPD GOLD A as well as mild emphysema but with significant air trapping. She is already on Trelegy but given her lack of symptoms this seems more than GOLD guidelines would recommend. For minimal symptoms PRN albuterol is appropriate. Suggested we trial Stiolto to at least decrease pneumonia risk.         Plan:   Stiolto to replace Trelegy. If too expensive we can go back on Trelegy but not sure she needs it given her lack of symptoms  RTC 1 year.  Call with symptoms.  Already had Flu, COVID and RSV vaccines.     Priya Rivera MD  Pulmonary/Critical Care        ---------------------------------    Reason for Consult: establish care for COPD    HPI: 84yoF with history of breast cancer now in remission but with recent thryoid cancer s/p resection here to establish care. She is a former smoker and has a 40 pack year history but quit decades ago.  She is a Former patient of Dr. Hernandez at [facility] but moved so here to establish care.     Breathing is limited at times but able to do ADLs. Current regimen is trelegy. Likes it more than the advair but not sure it's doing much.     CAT: 1+0+0+1+0+0+0+1=3    ROS:  A 12-system review was notable for dyspnea.  The remainder reviewed and negative.     PMH:  Past Medical History:   Diagnosis Date    Anxiety state 01/31/2017    Arthritis     Atrophic vaginitis 05/08/2014    Overview:  UPDATE: Followed-up with Dr. Cassie Arteaga on 5-8-14. Noted that patient did not want to pay for the estrogen cream. Impression female stress incontinence and atrophic vaginitis.    Bilateral leg paresthesia 05/13/2014    Overview:  US FRANCISCO W EXERCISE BILATERAL 5-14-14: IMPRESSION: RIGHT LOWER EXTREMITY: FRANCISCO at rest is normal with a normal response to exercise. These findings would not be consistent with symptoms of arterial claudication. LEFT LOWER EXTREMITY: FRANCISCO at rest is normal with a normal response to exercise. These findings would not be  consistent with symptoms of arterial claudication. US VENOUS LOWER EXTREM    Bone pain 07/24/2018    Breast cancer (H)     Breast cancer, stage 2, right (H) 05/16/2018    Overview:  Added automatically from request for surgery [id number]    Chronic cough 02/12/2018    Overview:  XR CHEST 2 VIEWS PA AND LATERAL 12-4-17: Normal heart size and pulmonary vascularity. Tiny granulomas with some fibrosis in the right lung base. The left lung is clear. Slight deviation of the upper trachea from left to right presumably due to thyroid enlargement stable. No change from previous. No acute process is identified. No focal pulmonary infiltrates.    Chronic diarrhea 05/12/2017    Overview:  UPDATE: COLONOSCOPY DIAGNOSTIC 7-25-17: Aphthous ulcer of ileum. Diverticulosis of colon without diverticulitis. Pathology Results: NEOTERMINAL ILEUM,  BIOPSY: Nonspecific chronic active ileitis. No dysplasia or malignancy. COLON, RANDOM, BIOPSY: Normal colonic mucosa STOOL TESTS on 5-12-17 for culture, Clostridium dificile toxin, WBC, Giardia antigen, Campylobacter, Shiga toxin, Stool f    Chronic pain of right knee 10/23/2017    Overview:  XR KNEE 3 VIEWS RIGHT 10-23-17: Negative knee. No fracture or dislocation. No joint effusion.    Chronic rhinitis 06/08/2017    Chronic right hip pain 10/23/2017    Overview:  XR HIP 2 OR 3 VIEWS W PELVIS RIGHT 10-23-17: Arthritic change right hip. Pelvis otherwise negative.    Decreased range of motion of right shoulder 10/08/2018    Depressive disorder     Diarrhea 07/24/2018    Difficult or painful urination 12/08/2009    Overview:  UPDATE: Followed-up with Dr. Cassie Arteaga on 5-8-14. Noted that patient did not want to pay for the estrogen cream. Impression female stress incontinence and atrophic vaginitis. Exacerbation of her dysuria symptoms. She has not used her estrace vaginal cream due to cost noted 9-10-13. Urinalysis negative on 9-10-13. Urinalysis and urine microscopy showed some signs of urinary  tract infecti    Drug-induced nausea and vomiting 07/17/2018    Dry mouth 05/24/2016    Gallbladder polyp 04/14/2012    Overview:  UPDATE:US ABDOMEN LIMITED RUQ 4-18-16: 5 mm stone versus polyp in the gallbladder, decreased in size since comparison study where measured 7 mm. The gallbladder is contracted despite being NPO, which limits evaluation. Benign parapelvic cyst in the lower pole of the right kidney measuring 2.8 cm, is unchanged. US ABDOMEN LIMITED RUQ on 2-3-14:  Stable appearance of a 8 mm cholesterol tristin    Ganglion cyst of flexor tendon sheath of finger of right hand 05/24/2016    History of breast cancer 11/21/2006    Overview:  UPDATE: XR MAMMO UNI SCREEN FFDM RIGHT 4-14-14: ACR 1 Negative. Followed-up with oncologist Dr. Phyllis Colon on 4-29-14. Stable.    XR MAMMO UNI SCREEN FFDM RIGHT: 4-4-12, 4-5-13: ACR 2 Benign Finding. Followed-up with Dr. Phyllis Colon 5-23-13. CBC and CMP normal except for low glucose of 55 . CA 27-29 normal.   Followed-up with Dr. Phyllis Colon 11-16-12. CBC and CMP normal exce    Hypercholesteremia 12/08/2014    Overview:  UPDATE: Rx atorvastatin (Lipitor) 1-22-15. She sent medical message on 3-18-15 requesting to take atorvastatin (Lipitor) 20 mg tablet every other day. This would not work for atorvastatin (Lipitor) . I advised to take half tablet (10 mg) daily rather than taking one tablet every other day. ASCVD Risk  10 year risk 7.9 % calculated on 12/8/2014.  Recommendation moderate to high -    IC (interstitial cystitis) 12/02/2010    Overview:  UPDATE: UPDATE: Followed-up with Dr. Cassie Arteaga on 5-8-14. Noted that patient did not want to pay for the estrogen cream. Impression female stress incontinence and atrophic vaginitis. Exacerbation of her dysuria symptoms. She has not used her estrace vaginal cream due to cost noted 9-10-13. Urinalysis negative on 9-10-13. Urinalysis and urine microscopy showed some signs of urinary tract    Ileitis 07/25/2017     Overview:  COLONOSCOPY DIAGNOSTIC 7-25-17: Aphthous ulcer of ileum. Diverticulosis of colon without diverticulitis. Pathology Results: NEOTERMINAL ILEUM,  BIOPSY: Nonspecific chronic active ileitis. No dysplasia or malignancy. COLON, RANDOM, BIOPSY: Normal colonic mucosa . Advised to follow-up with [facility] regarding chronic active ileitis.    Impaired fasting glucose 12/01/2006    Insomnia secondary to depression with anxiety 01/31/2017    Left leg swelling 05/13/2014    Overview:  US VENOUS LOWER EXTREMITY LEFT 5-14-14: Left leg veins are negative for DVT.    Left thyroid nodule 04/30/2018    Lymphedema of extremity 10/08/2018    Major depression, recurrent, full remission (H) 01/31/2017    Overview:  Inpatient treatment for depression after divorce in 1983. Major depression diagnosed due to financial worries diagnosed 11-23-10 by oncologist, Louise Burns. Citalopram 20 mg daily started. Citalopram discontinued on 5-19-11 per patient request.    Malignant neoplasm of upper-inner quadrant of right breast in female, 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) 10/08/2018    Mixed stress and urge urinary incontinence 07/24/2006    Overview:  UPDATE: Followed-up with Dr. Cassie Arteaga on 5-8-14. Noted that patient did not want to pay for the estrogen cream. Impression female stress incontinence and atrophic vaginitis.  Exacerbation of her dysuria symptoms. She has not used her estrace vaginal cream due to cost noted 9-10-13. Urinalysis negative on 9-10-13. Urinalysis and urine microscopy showed some signs of urinary tract infect    Nasal congestion 06/05/2014    Osteopenia 11/01/2006    Overview:  UPDATE:  XR DXA BONE DENSITY 2 SITES AXIAL 5/16/2018: Osteopenia. Lumbar Spine L1-L4 T-score -1.8 . Mean Bilateral Femoral Neck T-score -1.4 . FRAX Results: 10-year probability of major osteoporotic fracture is 10.8%, and of hip fracture is 2%, based on left femoral neck BMD. Basic bone health recommended.  XR DXA BONE DENSITY 2 SITES  AXIAL 4-18-16: T score AP spine -2.1, Left femoral ne    Other emphysema (H) 04/30/2018    Other specified disorders of nose and nasal sinuses 07/24/2018    Peripheral axonal neuropathy 11/22/2011    Overview:  UPDATE:  She thinks gabapentin (Neurontin) has been helpful without side effect of leg swelling discussed on 11-20-14. She agreed to increase dose to 300 mg at bedtime. She called on 10-30-14 requesting for gabapentin (Neurontin) as nortriptyline not helpful. Reminded her that she complained of leg swelling with gabapentin (Neurontin) in the past.  EMG 5-19-14: Borderline abnormal EMG du    Personal history of tobacco use, presenting hazards to health 10/22/2018    Overview:  20 pack years    Pharyngeal dysphagia 12/08/2009    Recurrent UTI 09/12/2013    Overview:  UPDATE: Followed-up with Dr. Cassie Arteaga on 5-8-14. Noted that patient did not want to pay for the estrogen cream. Impression female stress incontinence and atrophic vaginitis.  Exacerbation of her dysuria symptoms. She has not used her estrace vaginal cream due to cost noted 9-10-13. Urinalysis negative on 9-10-13. Urinalysis and urine microscopy showed some signs of urinary tract infect    Salivation excessive 12/17/2008    Overview:  may be due to postnasal drip causing excessive salivation as she tends to swallow the postnasal drainage.She is not candidate for tricyclic antidepressant like nortriptyline (can cause dry mouth) to lessen salivary secretions as this would excerbate her dry lips.    SOB (shortness of breath) 05/13/2014    Overview:  XR CHEST 2 VIEWS PA AND LATERAL 5-13-14: Impression: On the lateral view, a small patchy opacity is again visualized and has a few linear markings. This may be chronic, it is suggested on the prior exam slightly more prominent but this is probably due to technique, could represent chronic areas of subsegmental volume loss or previous consolidation. It is not well visualized on the PA vie    Tremor,  essential 2011    Overview:  Neurology consult Dr. Luther Armenta on 3-26-12. Impression essential tremor. MRI brain. Neurontin 100 mg bid started to help tremors and bilateral lower extremity paresthesia thought due to impaired FG or prediabetes. Normal EMG of lower extremity on 12 but compound motor action potentials low which can be seen in early axonal neuropathy. Developed swelling with gabapentin (Neurontin    Vitamin D insufficiency 2012    Overview:  Vitamin D was low at 28.1 on 12-3-12. Take vitamin D 3000 units daily for 8 weeks, then take vitamin D 2000 units indefinitely. Vitamin D was normal at 40.8 on 3-6-13. Continue vitamin D 2000 units indefinitely.    Vitreous degeneration 10/22/2018    Overview:  Right eye       PSH:  Social History     Tobacco Use    Smoking status: Former     Current packs/day: 0.00     Average packs/day: 0.5 packs/day for 40.0 years (20.0 ttl pk-yrs)     Types: Cigarettes     Start date: 1965     Quit date: 2005     Years since quittin.7    Smokeless tobacco: Never   Vaping Use    Vaping status: Never Used   Substance Use Topics    Alcohol use: No     Comment: Alcoholic Drinks/day: occassionaly     Drug use: No         Family HX:  Family History   Problem Relation Age of Onset    Diabetes Brother     Hypertension Brother     Thyroid Disease Brother         Surgery    Prostate Cancer Father     Kidney Disease Father         One kidney does not function    Seizure Disorder Father     Cancer Maternal Grandmother         Bladder    Arthritis Maternal Grandmother     Hypertension Maternal Grandmother     Osteoporosis Maternal Grandmother     Alcoholism Mother     Substance Abuse Mother     Ulcers Mother     Hypertension Mother     Diabetes Brother     Hypertension Brother     Allergic rhinitis Sister     Asthma Sister     Breast Cancer Maternal Cousin         DCIS       Allergies:  Allergies   Allergen Reactions    Cephalexin Shortness Of Breath      "Bladder burning    Amitriptyline Unknown    Gabapentin Swelling     Shortness of breath    Latex Swelling and Other (See Comments)    Sulfa Antibiotics Unknown    Tetracyclines & Related Unknown       Current Meds:  Current Outpatient Medications   Medication Sig Dispense Refill    albuterol (ACCUNEB) 1.25 MG/3ML neb solution Take 1.25 mg by nebulization 2 times daily as needed for shortness of breath, wheezing or cough      alendronate (FOSAMAX) 70 MG tablet Take 1 tablet (70 mg) by mouth every 7 days. 12 tablet 3    calcium 600 MG tablet Take 1 tablet (600 mg) by mouth 2 times daily 180 tablet 3    Cyanocobalamin (B-12) 1000 MCG TBCR Take 1,000 mcg by mouth daily      escitalopram (LEXAPRO) 10 MG tablet Take 1 tablet (10 mg) by mouth daily. 90 tablet 3    famotidine (PEPCID) 20 MG tablet Take 1 tablet (20 mg) by mouth 2 times daily. 90 tablet 1    Fluticasone-Umeclidin-Vilant (TRELEGY ELLIPTA) 100-62.5-25 MCG/ACT oral inhaler Inhale 1 puff into the lungs daily      ipratropium (ATROVENT) 0.06 % nasal spray Spray 2 sprays into both nostrils 4 times daily (Patient not taking: Reported on 10/22/2024) 15 mL 2     No current facility-administered medications for this visit.         Physical Exam:  /68 (BP Location: Left arm, Patient Position: Sitting, Cuff Size: Adult Regular)   Pulse 69   Ht 1.562 m (5' 1.5\")   Wt 72.8 kg (160 lb 9.6 oz)   LMP  (LMP Unknown)   SpO2 96%   BMI 29.85 kg/m    Gen: alert, oriented, no distress  HEENT: anicteric sclera, mask in place.  Neck: trachea midline, no cervical or supraclavicular lymphadenopathy  CV: Regular rate and rhythm, normal S1 and S2.   Resp: Clear bilaterally with good air entry.   Abd: soft, nontender  Skin: no visible rashes or lesions.   Ext: no cyanosis, clubbing or edema  Neuro: alert, nonfocal, grossly normal.     Labs:    Recent Labs   Lab Test 10/24/24  1038 07/23/24  0951   WBC  --  6.6   RBC  --  5.07   HGB 13.5 14.3   HCT  --  44.3   MCV  --  87 "   MCH  --  28.2   MCHC  --  32.3   RDW  --  14.6   PLT  --  259     No results found for this or any previous visit.        Imaging studies:  Personally reviewed image/s and Personally reviewed impression/s  EXAM: CT CHEST WO   LOCATION: [facility]AG[facility]   DATE/TIME: 10/4/2022 2:28 PM     INDICATION: Chronic Obstructive Pulmonary Disease, Unspecified Copd Type (hc)   COMPARISON: CT chest 12/29/2020.   TECHNIQUE: CT chest without IV contrast. Multiplanar reformats were obtained. Dose reduction techniques were used.   CONTRAST: None.     FINDINGS:   LUNGS AND PLEURA: Mild emphysema. Some minimal fibrosis or atelectasis in the right middle lobe. Lungs otherwise clear. The previously noted tiny nodule right lung base not clearly seen today. No new findings.     MEDIASTINUM/AXILLAE: Stable left thyroid goiter.     CORONARY ARTERY CALCIFICATION: Mild.     UPPER ABDOMEN: Small sliding esophageal hiatal hernia.     MUSCULOSKELETAL: Unremarkable.     1.  Mild emphysema.     2.  No other significant findings in the lungs.     3.  Stable left thyroid goiter.     4.  Stable small sliding esophageal hiatal hernia.       PFT's   Personally reviewed with patient.     Impression: mild obstruction with hyperinflation and air trapping. Normal diffusion capacity.

## 2024-10-28 ENCOUNTER — VIRTUAL VISIT (OUTPATIENT)
Dept: ENDOCRINOLOGY | Facility: CLINIC | Age: 84
End: 2024-10-28
Payer: MEDICARE

## 2024-10-28 DIAGNOSIS — E04.1 THYROID NODULE: Primary | ICD-10-CM

## 2024-10-28 LAB — TSH SERPL DL<=0.005 MIU/L-ACNC: 2.9 UIU/ML (ref 0.3–4.2)

## 2024-10-28 PROCEDURE — 99213 OFFICE O/P EST LOW 20 MIN: CPT | Mod: 95 | Performed by: INTERNAL MEDICINE

## 2024-10-28 PROCEDURE — G2211 COMPLEX E/M VISIT ADD ON: HCPCS | Mod: 95 | Performed by: INTERNAL MEDICINE

## 2024-10-28 NOTE — LETTER
10/28/2024      Jody Ch  [address]  [address]  [facility] [address]      Dear Colleague,    Thank you for referring your patient, Jody Ch, to the [facility] [place]. Please see a copy of my visit note below.    Video-Visit Details    Type of service:  Video Visit  Video Start Time: 0725  Video End Time: 0736  Originating Location (pt. Location): [place], [place]  Distant Location (provider location):  Home  Platform used for Video Visit: Rochelle Wallace MD      HISTORY OF PRESENT ILLNESS  Jody Ch is a 84 year old female with h/o breast cancer, chronic cough, pharyngeal dysphagia, MDD who is here for FU s/p hemithyroidectomy for Afirma suspicious thyroid nodule.    Interval History  Had UTI after surgery, resolved  No changes in voice after surgery, improve with breathing exercise  Patho benign  No problem swallowing/ breathing.    Initial visit  Pt has breast cancer and had a scan and incidentally found of thyroid nodule.  FNA 2018.  Pt moved and re-established  care with PCP, got US, nodule has grown and got a repeat FNA.  New hoarsness for >1 year, comes and goes  Choking with swallowing, got test at [facility]. Swallowing problem with liquid, and solid food. Eg. Hard boil egg, feeling like food stuck and need to drink water to follow  No changes in breathing    Brother s/p thyroid surgery  Cousin with thyroid problems    Currently breast cancer, 5 years in remission    REVIEW OF SYSTEMS  10 point negative except as mentioned in HPI    Past Medical/Surgical History:  Past Medical History:   Diagnosis Date     Anxiety state 01/31/2017     Arthritis      Atrophic vaginitis 05/08/2014    Overview:  UPDATE: Followed-up with Dr. Cassie Arteaga on 5-8-14. Noted that patient did not want to pay for the estrogen cream. Impression female stress incontinence and atrophic vaginitis.     Bilateral leg paresthesia 05/13/2014    Overview:  US FRANCISCO W EXERCISE BILATERAL  5-14-14: IMPRESSION: RIGHT LOWER EXTREMITY: FRANCISCO at rest is normal with a normal response to exercise. These findings would not be consistent with symptoms of arterial claudication. LEFT LOWER EXTREMITY: FRANCISCO at rest is normal with a normal response to exercise. These findings would not be consistent with symptoms of arterial claudication. US VENOUS LOWER EXTREM     Bone pain 07/24/2018     Breast cancer (H)      Breast cancer, stage 2, right (H) 05/16/2018    Overview:  Added automatically from request for surgery [id number]     Chronic cough 02/12/2018    Overview:  XR CHEST 2 VIEWS PA AND LATERAL 12-4-17: Normal heart size and pulmonary vascularity. Tiny granulomas with some fibrosis in the right lung base. The left lung is clear. Slight deviation of the upper trachea from left to right presumably due to thyroid enlargement stable. No change from previous. No acute process is identified. No focal pulmonary infiltrates.     Chronic diarrhea 05/12/2017    Overview:  UPDATE: COLONOSCOPY DIAGNOSTIC 7-25-17: Aphthous ulcer of ileum. Diverticulosis of colon without diverticulitis. Pathology Results: NEOTERMINAL ILEUM,  BIOPSY: Nonspecific chronic active ileitis. No dysplasia or malignancy. COLON, RANDOM, BIOPSY: Normal colonic mucosa STOOL TESTS on 5-12-17 for culture, Clostridium dificile toxin, WBC, Giardia antigen, Campylobacter, Shiga toxin, Stool f     Chronic pain of right knee 10/23/2017    Overview:  XR KNEE 3 VIEWS RIGHT 10-23-17: Negative knee. No fracture or dislocation. No joint effusion.     Chronic rhinitis 06/08/2017     Chronic right hip pain 10/23/2017    Overview:  XR HIP 2 OR 3 VIEWS W PELVIS RIGHT 10-23-17: Arthritic change right hip. Pelvis otherwise negative.     Decreased range of motion of right shoulder 10/08/2018     Depressive disorder      Diarrhea 07/24/2018     Difficult or painful urination 12/08/2009    Overview:  UPDATE: Followed-up with Dr. Cassie Arteaga on 5-8-14. Noted that patient did not  want to pay for the estrogen cream. Impression female stress incontinence and atrophic vaginitis. Exacerbation of her dysuria symptoms. She has not used her estrace vaginal cream due to cost noted 9-10-13. Urinalysis negative on 9-10-13. Urinalysis and urine microscopy showed some signs of urinary tract infecti     Drug-induced nausea and vomiting 07/17/2018     Dry mouth 05/24/2016     Gallbladder polyp 04/14/2012    Overview:  UPDATE:US ABDOMEN LIMITED RUQ 4-18-16: 5 mm stone versus polyp in the gallbladder, decreased in size since comparison study where measured 7 mm. The gallbladder is contracted despite being NPO, which limits evaluation. Benign parapelvic cyst in the lower pole of the right kidney measuring 2.8 cm, is unchanged. US ABDOMEN LIMITED RUQ on 2-3-14:  Stable appearance of a 8 mm cholesterol tristin     Ganglion cyst of flexor tendon sheath of finger of right hand 05/24/2016     History of breast cancer 11/21/2006    Overview:  UPDATE: XR MAMMO UNI SCREEN FFDM RIGHT 4-14-14: ACR 1 Negative. Followed-up with oncologist Dr. Phyllis Colon on 4-29-14. Stable.    XR MAMMO UNI SCREEN FFDM RIGHT: 4-4-12, 4-5-13: ACR 2 Benign Finding. Followed-up with Dr. Phyllis Colon 5-23-13. CBC and CMP normal except for low glucose of 55 . CA 27-29 normal.   Followed-up with Dr. Phyllis Colon 11-16-12. CBC and CMP normal exce     Hypercholesteremia 12/08/2014    Overview:  UPDATE: Rx atorvastatin (Lipitor) 1-22-15. She sent medical message on 3-18-15 requesting to take atorvastatin (Lipitor) 20 mg tablet every other day. This would not work for atorvastatin (Lipitor) . I advised to take half tablet (10 mg) daily rather than taking one tablet every other day. ASCVD Risk  10 year risk 7.9 % calculated on 12/8/2014.  Recommendation moderate to high -     IC (interstitial cystitis) 12/02/2010    Overview:  UPDATE: UPDATE: Followed-up with Dr. Cassie Arteaga on 5-8-14. Noted that patient did not want to pay for the  estrogen cream. Impression female stress incontinence and atrophic vaginitis. Exacerbation of her dysuria symptoms. She has not used her estrace vaginal cream due to cost noted 9-10-13. Urinalysis negative on 9-10-13. Urinalysis and urine microscopy showed some signs of urinary tract     Ileitis 07/25/2017    Overview:  COLONOSCOPY DIAGNOSTIC 7-25-17: Aphthous ulcer of ileum. Diverticulosis of colon without diverticulitis. Pathology Results: NEOTERMINAL ILEUM,  BIOPSY: Nonspecific chronic active ileitis. No dysplasia or malignancy. COLON, RANDOM, BIOPSY: Normal colonic mucosa . Advised to follow-up with [place] [facility] regarding chronic active ileitis.     Impaired fasting glucose 12/01/2006     Insomnia secondary to depression with anxiety 01/31/2017     Left leg swelling 05/13/2014    Overview:   VENOUS LOWER EXTREMITY LEFT 5-14-14: Left leg veins are negative for DVT.     Left thyroid nodule 04/30/2018     Lymphedema of extremity 10/08/2018     Major depression, recurrent, full remission (H) 01/31/2017    Overview:  Inpatient treatment for depression after divorce in 1983. Major depression diagnosed due to financial worries diagnosed 11-23-10 by oncologist, Louise Burns. Citalopram 20 mg daily started. Citalopram discontinued on 5-19-11 per patient request.     Malignant neoplasm of upper-inner quadrant of right breast in female, 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) 10/08/2018     Mixed stress and urge urinary incontinence 07/24/2006    Overview:  UPDATE: Followed-up with Dr. Cassie Arteaga on 5-8-14. Noted that patient did not want to pay for the estrogen cream. Impression female stress incontinence and atrophic vaginitis.  Exacerbation of her dysuria symptoms. She has not used her estrace vaginal cream due to cost noted 9-10-13. Urinalysis negative on 9-10-13. Urinalysis and urine microscopy showed some signs of urinary tract infect     Nasal congestion 06/05/2014     Osteopenia 11/01/2006    Overview:  UPDATE:  XR DXA BONE  DENSITY 2 SITES AXIAL 5/16/2018: Osteopenia. Lumbar Spine L1-L4 T-score -1.8 . Mean Bilateral Femoral Neck T-score -1.4 . FRAX Results: 10-year probability of major osteoporotic fracture is 10.8%, and of hip fracture is 2%, based on left femoral neck BMD. Basic bone health recommended.  XR DXA BONE DENSITY 2 SITES AXIAL 4-18-16: T score AP spine -2.1, Left femoral ne     Other emphysema (H) 04/30/2018     Other specified disorders of nose and nasal sinuses 07/24/2018     Peripheral axonal neuropathy 11/22/2011    Overview:  UPDATE:  She thinks gabapentin (Neurontin) has been helpful without side effect of leg swelling discussed on 11-20-14. She agreed to increase dose to 300 mg at bedtime. She called on 10-30-14 requesting for gabapentin (Neurontin) as nortriptyline not helpful. Reminded her that she complained of leg swelling with gabapentin (Neurontin) in the past.  EMG 5-19-14: Borderline abnormal EMG du     Personal history of tobacco use, presenting hazards to health 10/22/2018    Overview:  20 pack years     Pharyngeal dysphagia 12/08/2009     Recurrent UTI 09/12/2013    Overview:  UPDATE: Followed-up with Dr. Cassie Arteaga on 5-8-14. Noted that patient did not want to pay for the estrogen cream. Impression female stress incontinence and atrophic vaginitis.  Exacerbation of her dysuria symptoms. She has not used her estrace vaginal cream due to cost noted 9-10-13. Urinalysis negative on 9-10-13. Urinalysis and urine microscopy showed some signs of urinary tract infect     Salivation excessive 12/17/2008    Overview:  may be due to postnasal drip causing excessive salivation as she tends to swallow the postnasal drainage.She is not candidate for tricyclic antidepressant like nortriptyline (can cause dry mouth) to lessen salivary secretions as this would excerbate her dry lips.     SOB (shortness of breath) 05/13/2014    Overview:  XR CHEST 2 VIEWS PA AND LATERAL 5-13-14: Impression: On the lateral view, a small  patchy opacity is again visualized and has a few linear markings. This may be chronic, it is suggested on the prior exam slightly more prominent but this is probably due to technique, could represent chronic areas of subsegmental volume loss or previous consolidation. It is not well visualized on the PA vie     Tremor, essential 11/22/2011    Overview:  Neurology consult Dr. Luther Armenta on 3-26-12. Impression essential tremor. MRI brain. Neurontin 100 mg bid started to help tremors and bilateral lower extremity paresthesia thought due to impaired FG or prediabetes. Normal EMG of lower extremity on 4-11-12 but compound motor action potentials low which can be seen in early axonal neuropathy. Developed swelling with gabapentin (Neurontin     Vitamin D insufficiency 12/03/2012    Overview:  Vitamin D was low at 28.1 on 12-3-12. Take vitamin D 3000 units daily for 8 weeks, then take vitamin D 2000 units indefinitely. Vitamin D was normal at 40.8 on 3-6-13. Continue vitamin D 2000 units indefinitely.     Vitreous degeneration 10/22/2018    Overview:  Right eye     Past Surgical History:   Procedure Laterality Date     ABDOMEN SURGERY       APPENDECTOMY       BLEPHAROPLASTY Bilateral 11/07/2013     EXCISE NODULE THYROID Left 7/24/2024    Procedure: Left thyroid lobectomy and isthmusectomy, Vascular Port Removal;  Surgeon: Eryn Jose MD;  Location: [facility] OR     FINGER GANGLION CYST EXCISION Right 06/23/2016    right ring finger     LUMPECTOMY BREAST  11/21/2006    Infiltrating ductal carcinoma, micropapillary variant, [place]     MASTECTOMY MODIFIED RADICAL Left 12/06/2006     OTHER SURGICAL HISTORY Right 11/07/2013    WI REMOVE EYELID LESN (NOT CHALAZION)     OTHER SURGICAL HISTORY Right 05/18/2018    US BIOPSY BREAST NEEDLE W BIBIANA W GUIDE RIGHT     OTHER SURGICAL HISTORY Right 06/01/2018    RIGHT SIMPLE MASTECTOMY WITH RIGHT SENTINAL LYMPH NODE [id number] AND RIGHT AXILLARY NODE DISSECTION     REMOVE PORT  VASCULAR ACCESS Left 7/24/2024    Procedure: Vascular Port Removal  (Left);  Surgeon: Eryn Jose MD;  [place] [facility]       Medications  Current Outpatient Medications   Medication Sig Dispense Refill     albuterol (ACCUNEB) 1.25 MG/3ML neb solution Take 1.25 mg by nebulization 2 times daily as needed for shortness of breath, wheezing or cough       alendronate (FOSAMAX) 70 MG tablet Take 1 tablet (70 mg) by mouth every 7 days. 12 tablet 3     calcium 600 MG tablet Take 1 tablet (600 mg) by mouth 2 times daily 180 tablet 3     Cobalamin Combinations (VITAMIN B12-FOLIC ACID) 500-400 MCG TABS 1000iu 1/day       Cyanocobalamin (B-12) 1000 MCG TBCR Take 1,000 mcg by mouth daily       escitalopram (LEXAPRO) 10 MG tablet Take 1 tablet (10 mg) by mouth daily. 90 tablet 3     famotidine (PEPCID) 20 MG tablet Take 1 tablet (20 mg) by mouth 2 times daily. 90 tablet 1     Fluticasone-Umeclidin-Vilant (TRELEGY ELLIPTA) 100-62.5-25 MCG/ACT oral inhaler Inhale 1 puff into the lungs daily       ipratropium (ATROVENT) 0.06 % nasal spray Spray 2 sprays into both nostrils 4 times daily 15 mL 2     omeprazole (PRILOSEC) 20 MG DR capsule        tiotropium-olodaterol (STIOLTO RESPIMAT) 2.5-2.5 MCG/ACT AERS Inhale 2 puffs into the lungs daily. 4 g 11     No current facility-administered medications for this visit.       Allergies  Allergies   Allergen Reactions     Cephalexin Shortness Of Breath     Bladder burning     Amitriptyline Unknown     Gabapentin Swelling     Shortness of breath     Latex Swelling and Other (See Comments)     Sulfa Antibiotics Unknown     Tetracyclines & Related Unknown         Family History  family history includes Alcoholism in her mother; Allergic rhinitis in her sister; Arthritis in her maternal grandmother; Asthma in her sister; Breast Cancer in her maternal cousin; Cancer in her maternal grandmother; Diabetes in her brother and brother; Hypertension in her brother, brother, maternal  grandmother, and mother; Kidney Disease in her father; Osteoporosis in her maternal grandmother; Prostate Cancer in her father; Seizure Disorder in her father; Substance Abuse in her mother; Thyroid Disease in her brother; Ulcers in her mother.    Social History  Social History     Tobacco Use     Smoking status: Former     Current packs/day: 0.00     Average packs/day: 0.5 packs/day for 40.0 years (20.0 ttl pk-yrs)     Types: Cigarettes     Start date: 1965     Quit date: 2005     Years since quittin.7     Passive exposure: Past (Dad was a smoker)     Smokeless tobacco: Never   Substance Use Topics     Alcohol use: No     Comment: Alcoholic Drinks/day: occassionaly        Physical Exam  LMP  (LMP Unknown)   There is no height or weight on file to calculate BMI.  GENERAL :  In no apparent distress  NEURO: awake, alert, responds appropriately to questions.      DATA REVIEW  Labs/Imaging    TSH   Date Value Ref Range Status   2024 3.46 0.30 - 4.20 uIU/mL Final   2024 1.58 0.30 - 4.20 uIU/mL Final   10/13/2023 0.81 0.30 - 4.20 uIU/mL Final     EXAM: US THYROID  LOCATION: [facility]  DATE: 2024     INDICATION: FU thyroid nodule  COMPARISON: 10/27/2023  TECHNIQUE: Thyroid ultrasound.      FINDINGS:  RIGHT lobe: 3.9 x 1.2 x 1.4 cm. Homogeneous echotexture.  Isthmus: 5 mm.  LEFT lobe: 5.4 x 4.2 x 4.4 cm. Homogeneous echotexture.     NECK: No cervical lymphadenopathy.     NODULES:     Nodule 1: Right superior thyroid nodule, 0.7 x 0.7 x 0.8 cm, previously 0.7 x 0.5 x 0.4 cm   Composition: Solid or almost completely solid, 2 points   Echogenicity: Hyperechoic or isoechoic, 1 point   Shape: Wider-than-tall, 0 points   Margin: Ill-defined, 0 points   Echogenic Foci: None, or large comet-tail artifacts, 0 points   Point Total: 3 points. TI-RADS 3. If 2.5 cm or larger, recommend FNA; if 1.5 cm or larger, recommend follow up US at 1, 3, and 5 years.      Nodule 2: Left mid  thyroid nodule, 4.7 x 4.1 x 4.0 cm, previously 5.1 x 4.1 x 4.1 cm.  Composition: Solid or almost completely solid, 2 points   Echogenicity: Hypoechoic, 2 points   Shape: Wider-than-tall, 0 points   Margin: Smooth, 0 points   Echogenic Foci: None, or large comet-tail artifacts, 0 points   Point Total: 4-6 points. TI-RADS 4. If 1.5 cm or larger, recommend FNA; if 1 cm or larger, follow up US (annually for 5 years).                                                                      IMPRESSION:  Essentially unchanged size of the 4.7 cm left thyroid nodule. ACR TI-RADS 4. This nodule was previously aspirated in December 2023.         7/24/24  A. 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 Jose 7/2024 for 5 cm  Left Thyroid nodules, FNA AUS 12/2023, Afirma suspicious, ROM 50%, repeat FNA FLUS   1 mo lab after hemithyroidectomy TSH normal. We discussed about 50% patient s/p hemothyroidectomy requires LT4. Patient would like to recheck. Otherwise, no issues. Regarding hoarse voice, no changes, improve with breathing exercise, patient will contact ENT clinic if ongoing.  -- add on TSH with reflex from last week labs  -- Follow-up US 5/2025    Follow-up 5/2025 to review US    The longitudinal plan of care for the diagnosis(es)/condition(s) as documented were addressed during this visit. Due to the added complexity in care, I will continue to support Mikala in the subsequent management and with ongoing continuity of care.      Jamison Wallace MD      Again, thank you for allowing me to participate in the care of your patient.        Sincerely,        Jamison Wallace MD

## 2024-10-28 NOTE — NURSING NOTE
Current patient location: [address] [place] [address] N    [place] [id number]    Is the patient currently in the state of [place]? YES    Visit mode:VIDEO    If the visit is dropped, the patient can be reconnected by: VIDEO VISIT: Text to cell phone:   Telephone Information:   Mobile [phone]       Will anyone else be joining the visit? NO  (If patient encounters technical issues they should call [phone]    Are changes needed to the allergy or medication list? No, Pt stated no changes to allergies, and Pt stated no med changes    Are refills needed on medications prescribed by this physician? NO    Rooming Documentation:  Not applicable    Reason for visit: RECHVIVIEN ANDRADEF

## 2024-10-29 ENCOUNTER — TELEPHONE (OUTPATIENT)
Dept: PULMONOLOGY | Facility: CLINIC | Age: 84
End: 2024-10-29
Payer: MEDICARE

## 2024-10-29 ENCOUNTER — TELEPHONE (OUTPATIENT)
Dept: MULTI SPECIALTY CLINIC | Facility: CLINIC | Age: 84
End: 2024-10-29
Payer: MEDICARE

## 2024-10-29 DIAGNOSIS — J44.9 COPD (CHRONIC OBSTRUCTIVE PULMONARY DISEASE) (H): Primary | ICD-10-CM

## 2024-10-29 NOTE — TELEPHONE ENCOUNTER
Phone call from patient. Saw Dr. Rivera on 10/24/24.  Started Stiolto Respimat inhaler.  Does not think this is working for her as she just coughs when using it.  OK to stop and/or go back to [facility]?

## 2024-10-29 NOTE — TELEPHONE ENCOUNTER
Not able to reach patient by phone.  Left detailed VM message that she could stop and just use her albuterol if not having many symptoms OR go back to using the Trelegy ellipta inhaler.  Will send refill for the Trelegy so she has it if needed/wanted.  Left my phone number to call back if further questions.

## 2024-10-29 NOTE — TELEPHONE ENCOUNTER
Called patient to schedule an appointment  5/2025 with Dr. Wallace. Patient requested a call back next week.

## 2024-11-05 LAB
DLCOCOR-%PRED-PRE: 89 %
DLCOCOR-PRE: 15.3 ML/MIN/MMHG
DLCOUNC-%PRED-PRE: 90 %
DLCOUNC-PRE: 15.34 ML/MIN/MMHG
DLCOUNC-PRED: 17.05 ML/MIN/MMHG
ERV-%PRED-PRE: 74 %
ERV-PRE: 0.55 L
ERV-PRED: 0.73 L
EXPTIME-PRE: 7.55 SEC
FEF2575-%PRED-PRE: 50 %
FEF2575-PRE: 0.67 L/SEC
FEF2575-PRED: 1.34 L/SEC
FEFMAX-%PRED-PRE: 117 %
FEFMAX-PRE: 4.93 L/SEC
FEFMAX-PRED: 4.21 L/SEC
FEV1-%PRED-PRE: 87 %
FEV1-PRE: 1.4 L
FEV1FEV6-PRE: 65 %
FEV1FEV6-PRED: 77 %
FEV1FVC-PRE: 63 %
FEV1FVC-PRED: 78 %
FEV1SVC-PRE: 62 %
FEV1SVC-PRED: 60 %
FIFMAX-PRE: 4.31 L/SEC
FRCPLETH-%PRED-PRE: 165 %
FRCPLETH-PRE: 4.28 L
FRCPLETH-PRED: 2.58 L
FVC-%PRED-PRE: 106 %
FVC-PRE: 2.23 L
FVC-PRED: 2.09 L
IC-%PRED-PRE: 117 %
IC-PRE: 1.72 L
IC-PRED: 1.47 L
RVPLETH-%PRED-PRE: 174 %
RVPLETH-PRE: 3.74 L
RVPLETH-PRED: 2.14 L
TLCPLETH-%PRED-PRE: 132 %
TLCPLETH-PRE: 6 L
TLCPLETH-PRED: 4.52 L
VA-%PRED-PRE: 102 %
VA-PRE: 4.15 L
VC-%PRED-PRE: 84 %
VC-PRE: 2.26 L
VC-PRED: 2.67 L

## 2024-11-05 NOTE — TELEPHONE ENCOUNTER
Spoke with patient, she is wondering why she needs to follow up? She thought she was done with Endo?

## 2024-11-08 ENCOUNTER — PATIENT OUTREACH (OUTPATIENT)
Dept: CARE COORDINATION | Facility: CLINIC | Age: 84
End: 2024-11-08
Payer: MEDICARE

## 2024-11-08 DIAGNOSIS — J44.9 CHRONIC OBSTRUCTIVE PULMONARY DISEASE, UNSPECIFIED COPD TYPE (H): Primary | ICD-10-CM

## 2024-11-08 RX ORDER — ALBUTEROL SULFATE 90 UG/1
2 INHALANT RESPIRATORY (INHALATION) EVERY 6 HOURS PRN
Qty: 18 G | Refills: 11 | Status: SHIPPED | OUTPATIENT
Start: 2024-11-08

## 2024-11-08 NOTE — PROGRESS NOTES
Clinic Care Coordination Contact  Community Health Worker Follow Up    Care Gaps:     Health Maintenance Due   Topic Date Due    MAMMO SCREENING  05/11/2018       Patient accepted scheduling phone number for [facility]  to schedule independently     Care Plan:   Care Plan: Transportation Completed 7/15/2024      Problem: Lack of transportation  Resolved 7/15/2024      Goal: I have accomplished establishing reliable transportation  Completed 7/12/2024      Start Date: 4/12/2024    This Visit's Progress: 100% Recent Progress: 30%    Note:     Personal Plan    My brother will be giving me a ride to and from my surgery.                               Intervention and Education during outreach: I spoke with the patient today and she seems in good spirits and let me know that she didn't have anything more at this time that she would like assistance with. I gave patient my phone number again and patient will call me in the future if any needs arise.     CHW Plan: Patient has been on [facility] Maintenance since 7/12/24 and has no other goals that this patient would like to work on with Clinic Care Coordination. CHW sent request to [facility] RN pool to review for Graduation.    Megan Lai  Community Health Worker  [facility]  Clinic Care Coordination   [place], [place], [place], [facility] and [facility]  Office: [phone]

## 2024-11-18 ENCOUNTER — PATIENT OUTREACH (OUTPATIENT)
Dept: CARE COORDINATION | Facility: CLINIC | Age: 84
End: 2024-11-18
Payer: MEDICARE

## 2024-11-18 NOTE — LETTER
[facility] CARE COORDINATION    November 18, 2024    Jody Ch  [address] [address]  [address] [address]    Dear Jody,  Your Care Team congratulates you on your journey to maintain wellness. This document will help guide you on your journey to maintain a healthy lifestyle.  You can use this to help you overcome any barriers you may encounter.  If you should have any questions or concerns, you can contact the members of your Care Team or contact your Primary Care Clinic for assistance.     Health Maintenance  Health Maintenance Reviewed:      My Access Plan  Medical Emergency 911   Primary Clinic Line  - Contact [address] [place] [address] to establish care   24 Hour Appointment Line [phone] or  [phone] ([phone]) (toll-free)   24 Hour Nurse Line [phone] (toll-free)   Preferred Urgent Care     Preferred Hospital     Preferred Pharmacy [facility] - [place], [place] - [address]     Behavioral Health Crisis Line The National Suicide Prevention Lifeline at 1-108.386.5896 or 911     My Care Team Members  Patient Care Team         Relationship Specialty Notifications Start End    Geo Fong MD MD Ophthalmology  5/19/23     Phone: [phone] Fax: [phone]          [facility] [id number]    Jamison Wallace MD Assigned Endocrinology Provider   1/25/24     Phone: [phone] Fax: [phone]         [address] [place] [address]    Megan Lai CHW Community Health Worker  Admissions 4/12/24     Phone: [phone]         Khadra Lincoln, RN Lead Care Coordinator Primary Care - CC Admissions 6/26/24     Phone: [phone]         Eryn Jose MD MD Surgery  7/8/24     Phone: [phone] Fax: [phone]         [address] [place] [address]    Eryn Jose MD Assigned Surgical Provider   8/23/24     Phone: [phone] Fax: [phone]         [address] [place] [address]     Amparo Mays MD Assigned PCP   9/23/24     Phone: [phone] Fax: [phone] [address] [address]              Advance Care Plans/Directives Type:      We notice that you do not have an Advance Directive on file. Upon completion of your Health Care Directive, please bring a copy with you to your next office visit.    It has been your Clinic Care Team's pleasure to work with you on accomplishing your goals.    Regards,  Your Clinic Care Team

## 2024-11-18 NOTE — PROGRESS NOTES
Clinic Care Coordination Contact    Assessment: Per review, CHW contacted patient for 2 month follow up.  Patient has continued to follow the plan of care and assessment is negative for any new needs or concerns.    Enrollment status: Graduated.      Plan: No further outreaches at this time.  Patient will continue to follow the plan of care.  If new needs arise a new Care Coordination referral may be placed.  FYI to PCP    BEN Naik  Supervisor Ambulatory Care Management

## 2024-11-21 NOTE — TELEPHONE ENCOUNTER
[address] [facility] [address]  Dept: [phone]      Jay Shields  [address] [address]    11/22/2024      Dear Jay,    Our records indicate that you had a scheduled appointment on 11/21/24 with Outpatient Provider JUAN CARLOS Aragon for which you cancelled late or did not show for your appointment.    This is a reminder of [facility] missed appointment policy, which requires you to give at least 24 hour notice if you are unable to keep your appointment.      This letter is a reminder that after missing 3 appointments you may be discharged from JUAN CARLOS Aragon's care.    Please contact the clinic if you have any questions.       Sincerely,        [facility]         ----- Message from Jamison Wallace MD sent at 3/22/2024 10:51 AM CDT -----  Regarding: Possible   Hi Dr. Richard,    I met with Mikala today for her thyroid nodules.  Pt would like to have someone that can help her with appointment logistic.  Not sure if you have resource that your clinic to help with this.    Thank you so much!  Jamison Wallace

## 2024-12-18 ENCOUNTER — PATIENT OUTREACH (OUTPATIENT)
Dept: CARE COORDINATION | Facility: CLINIC | Age: 84
End: 2024-12-18
Payer: MEDICARE

## 2024-12-31 ENCOUNTER — TELEPHONE (OUTPATIENT)
Dept: PULMONOLOGY | Facility: CLINIC | Age: 84
End: 2024-12-31
Payer: MEDICARE

## 2024-12-31 DIAGNOSIS — J44.9 COPD (CHRONIC OBSTRUCTIVE PULMONARY DISEASE) (H): Primary | ICD-10-CM

## 2024-12-31 NOTE — TELEPHONE ENCOUNTER
Phone call from patient.  Very upset that her orders for her Trelegy inhaler have been cancelled by us in Nov.  She wanted to refill this and was told by the pharmacy that this had been cancelled.      Will send new prescription.now for the Trelegy ellipta

## 2025-02-19 DIAGNOSIS — K21.9 GASTRO-ESOPHAGEAL REFLUX DISEASE WITHOUT ESOPHAGITIS: ICD-10-CM

## 2025-02-21 NOTE — TELEPHONE ENCOUNTER
Routing refill request to provider for review/approval because:  Medication is reported/historical    Requested Prescriptions   Pending Prescriptions Disp Refills    omeprazole (PRILOSEC) 20 MG DR capsule [Pharmacy Med Name: omeprazole 20 mg capsule,delayed release (PRILOSEC)] 90 capsule 3     Sig: Take 1 capsule (20 mg) by mouth daily       PPI Protocol Failed - 2/21/2025  9:53 AM        Failed - Medication is active on med list and the sig matches. RN to manually verify dose and sig if red X/fail.     If the protocol passes (green check), you do not need to verify med dose and sig.    A prescription matches if they are the same clinical intention.    For Example: once daily and every morning are the same.    For all fails (red x), verify dose and sig.    If the refill does match what is on file, the RN can still proceed to approve the refill request.     If they do not match, route to the appropriate provider.             Passed - Medication indicated for associated diagnosis     The medication is prescribed for one or more of the following conditions:     Erosive esophagitis    Gastritis   Gastric hypersecretion   Gastric ulcer   Gastroesophageal reflux disease   Helicobacter pylori gastrointestinal tract infection   Ulcer of duodenum   Drug-induced peptic ulcer   Esophageal stricture   Gastrointestinal hemorrhage   Indigestion   Stress ulcer   Zollinger-Desai syndrome   Dobbins s esophagus   Laryngopharyngeal reflux   Epigastric Pain   Morbid Obesity   Cough   History of Peptic Ulcer   Esophageal Atresia, Stenosis and Fistula   Cystic Fibrosis  Bronchiectasis          Passed - Recent (12 mo) or future (90 days) visit within the authorizing provider's specialty     The patient must have completed an in-person or virtual visit within the past 12 months or has a future visit scheduled within the next 90 days with the authorizing provider s specialty.  Urgent care and e-visits do not qualify as an office visit for  this protocol.          Passed - Patient is age 18 or older        Passed - No active pregnacy on record        Passed - No positive pregnancy test in past 12 months

## 2025-02-23 RX ORDER — OMEPRAZOLE 20 MG/1
20 CAPSULE, DELAYED RELEASE ORAL DAILY
Qty: 90 CAPSULE | Refills: 0 | Status: SHIPPED | OUTPATIENT
Start: 2025-02-23

## 2025-03-09 ENCOUNTER — HEALTH MAINTENANCE LETTER (OUTPATIENT)
Age: 85
End: 2025-03-09

## 2025-04-26 ENCOUNTER — MYC REFILL (OUTPATIENT)
Dept: PULMONOLOGY | Facility: CLINIC | Age: 85
End: 2025-04-26
Payer: MEDICARE

## 2025-04-26 DIAGNOSIS — J44.9 CHRONIC OBSTRUCTIVE PULMONARY DISEASE, UNSPECIFIED COPD TYPE (H): ICD-10-CM

## 2025-04-28 RX ORDER — ALBUTEROL SULFATE 90 UG/1
2 INHALANT RESPIRATORY (INHALATION) EVERY 6 HOURS PRN
Qty: 18 G | Refills: 4 | Status: SHIPPED | OUTPATIENT
Start: 2025-04-28

## 2025-05-01 ENCOUNTER — MYC MEDICAL ADVICE (OUTPATIENT)
Dept: FAMILY MEDICINE | Facility: CLINIC | Age: 85
End: 2025-05-01
Payer: MEDICARE

## 2025-05-01 DIAGNOSIS — R73.03 PRE-DIABETES: ICD-10-CM

## 2025-05-01 DIAGNOSIS — R25.2 MUSCLE CRAMPS: ICD-10-CM

## 2025-05-01 DIAGNOSIS — E55.9 VITAMIN D DEFICIENCY: Primary | ICD-10-CM

## 2025-05-06 ENCOUNTER — HOSPITAL ENCOUNTER (OUTPATIENT)
Dept: ULTRASOUND IMAGING | Facility: HOSPITAL | Age: 85
Discharge: HOME | End: 2025-05-06
Attending: INTERNAL MEDICINE | Admitting: INTERNAL MEDICINE
Payer: MEDICARE

## 2025-05-06 DIAGNOSIS — E04.1 THYROID NODULE: ICD-10-CM

## 2025-05-06 PROCEDURE — 76536 US EXAM OF HEAD AND NECK: CPT

## 2025-05-07 ENCOUNTER — LAB (OUTPATIENT)
Dept: LAB | Facility: CLINIC | Age: 85
End: 2025-05-07
Payer: MEDICARE

## 2025-05-07 DIAGNOSIS — R25.2 MUSCLE CRAMPS: ICD-10-CM

## 2025-05-07 DIAGNOSIS — E55.9 VITAMIN D DEFICIENCY: ICD-10-CM

## 2025-05-07 DIAGNOSIS — R73.03 PRE-DIABETES: ICD-10-CM

## 2025-05-07 DIAGNOSIS — E04.1 THYROID NODULE: ICD-10-CM

## 2025-05-07 LAB
ALBUMIN SERPL BCG-MCNC: 4 G/DL (ref 3.5–5.2)
ALP SERPL-CCNC: 59 U/L (ref 40–150)
ALT SERPL W P-5'-P-CCNC: 16 U/L (ref 0–50)
ANION GAP SERPL CALCULATED.3IONS-SCNC: 8 MMOL/L (ref 7–15)
AST SERPL W P-5'-P-CCNC: 22 U/L (ref 0–45)
BILIRUB SERPL-MCNC: 0.5 MG/DL
BUN SERPL-MCNC: 19.4 MG/DL (ref 8–23)
CALCIUM SERPL-MCNC: 9.7 MG/DL (ref 8.8–10.4)
CHLORIDE SERPL-SCNC: 100 MMOL/L (ref 98–107)
CREAT SERPL-MCNC: 1.07 MG/DL (ref 0.51–0.95)
EGFRCR SERPLBLD CKD-EPI 2021: 52 ML/MIN/1.73M2
EST. AVERAGE GLUCOSE BLD GHB EST-MCNC: 114 MG/DL
GLUCOSE SERPL-MCNC: 108 MG/DL (ref 70–99)
HBA1C MFR BLD: 5.6 % (ref 0–5.6)
HCO3 SERPL-SCNC: 29 MMOL/L (ref 22–29)
POTASSIUM SERPL-SCNC: 4.8 MMOL/L (ref 3.4–5.3)
PROT SERPL-MCNC: 6.7 G/DL (ref 6.4–8.3)
SODIUM SERPL-SCNC: 137 MMOL/L (ref 135–145)
TSH SERPL DL<=0.005 MIU/L-ACNC: 3.69 UIU/ML (ref 0.3–4.2)
VIT D+METAB SERPL-MCNC: 45 NG/ML (ref 20–50)

## 2025-05-07 PROCEDURE — 80053 COMPREHEN METABOLIC PANEL: CPT

## 2025-05-07 PROCEDURE — 83036 HEMOGLOBIN GLYCOSYLATED A1C: CPT

## 2025-05-07 PROCEDURE — 84443 ASSAY THYROID STIM HORMONE: CPT

## 2025-05-07 PROCEDURE — 82306 VITAMIN D 25 HYDROXY: CPT

## 2025-05-07 PROCEDURE — 36415 COLL VENOUS BLD VENIPUNCTURE: CPT

## 2025-05-08 ENCOUNTER — RESULTS FOLLOW-UP (OUTPATIENT)
Dept: FAMILY MEDICINE | Facility: CLINIC | Age: 85
End: 2025-05-08

## 2025-05-12 ENCOUNTER — VIRTUAL VISIT (OUTPATIENT)
Dept: ENDOCRINOLOGY | Facility: CLINIC | Age: 85
End: 2025-05-12
Payer: MEDICARE

## 2025-05-12 DIAGNOSIS — E04.1 CYST OF THYROID: Primary | ICD-10-CM

## 2025-05-12 PROCEDURE — G2211 COMPLEX E/M VISIT ADD ON: HCPCS | Performed by: INTERNAL MEDICINE

## 2025-05-12 PROCEDURE — 98006 SYNCH AUDIO-VIDEO EST MOD 30: CPT | Performed by: INTERNAL MEDICINE

## 2025-05-12 PROCEDURE — 1126F AMNT PAIN NOTED NONE PRSNT: CPT | Mod: 95 | Performed by: INTERNAL MEDICINE

## 2025-05-12 ASSESSMENT — PAIN SCALES - GENERAL: PAINLEVEL_OUTOF10: NO PAIN (0)

## 2025-05-12 NOTE — LETTER
5/12/2025      Jody Ch  [address]  [address]  [place] [address]      Dear Colleague,    Thank you for referring your patient, Jody Ch, to the [facility]. Please see a copy of my visit note below.    Video-Visit Details    Type of service:  Video Visit  Video Start Time: 0929  Video End Time: 0948  Originating Location (pt. Location): Home, [place]  Distant Location (provider location):  Home  Platform used for Video Visit: Rochelle Wallace MD      HISTORY OF PRESENT ILLNESS  Jody Ch is a 84 year old female with h/o breast cancer, chronic cough, pharyngeal dysphagia, MDD who is here for FU s/p hemithyroidectomy.    Interval History  No problem swallowing/ breathing  Voice is rough in the morning and normal during the day  Pt saw speech therapist and did exercise in the past with improvement  On meds for acid reflux and Fosamax  Energy level is ok, just got over dehydration    Initial visit  Pt has breast cancer and had a scan and incidentally found of thyroid nodule.  FNA 2018.  Pt moved and re-established  care with PCP, got US, nodule has grown and got a repeat FNA.  New hoarsness for >1 year, comes and goes  Choking with swallowing, got test at [facility]. Swallowing problem with liquid, and solid food. Eg. Hard boil egg, feeling like food stuck and need to drink water to follow  No changes in breathing    Brother s/p thyroid surgery  Cousin with thyroid problems    Currently breast cancer, 5 years in remission    REVIEW OF SYSTEMS  10 point negative except as mentioned in HPI    Past Medical/Surgical History:  Past Medical History:   Diagnosis Date     Anxiety state 01/31/2017     Arthritis      Atrophic vaginitis 05/08/2014    Overview:  UPDATE: Followed-up with Dr. Cassie Arteaga on 5-8-14. Noted that patient did not want to pay for the estrogen cream. Impression female stress incontinence and atrophic vaginitis.     Bilateral leg  paresthesia 05/13/2014    Overview:  US FRANCISCO W EXERCISE BILATERAL 5-14-14: IMPRESSION: RIGHT LOWER EXTREMITY: FRANCISCO at rest is normal with a normal response to exercise. These findings would not be consistent with symptoms of arterial claudication. LEFT LOWER EXTREMITY: FRANCISCO at rest is normal with a normal response to exercise. These findings would not be consistent with symptoms of arterial claudication. US VENOUS LOWER EXTREM     Bone pain 07/24/2018     Breast cancer (H)      Breast cancer, stage 2, right (H) 05/16/2018    Overview:  Added automatically from request for surgery [id number]     Chronic cough 02/12/2018    Overview:  XR CHEST 2 VIEWS PA AND LATERAL 12-4-17: Normal heart size and pulmonary vascularity. Tiny granulomas with some fibrosis in the right lung base. The left lung is clear. Slight deviation of the upper trachea from left to right presumably due to thyroid enlargement stable. No change from previous. No acute process is identified. No focal pulmonary infiltrates.     Chronic diarrhea 05/12/2017    Overview:  UPDATE: COLONOSCOPY DIAGNOSTIC 7-25-17: Aphthous ulcer of ileum. Diverticulosis of colon without diverticulitis. Pathology Results: NEOTERMINAL ILEUM,  BIOPSY: Nonspecific chronic active ileitis. No dysplasia or malignancy. COLON, RANDOM, BIOPSY: Normal colonic mucosa STOOL TESTS on 5-12-17 for culture, Clostridium dificile toxin, WBC, Giardia antigen, Campylobacter, Shiga toxin, Stool f     Chronic pain of right knee 10/23/2017    Overview:  XR KNEE 3 VIEWS RIGHT 10-23-17: Negative knee. No fracture or dislocation. No joint effusion.     Chronic rhinitis 06/08/2017     Chronic right hip pain 10/23/2017    Overview:  XR HIP 2 OR 3 VIEWS W PELVIS RIGHT 10-23-17: Arthritic change right hip. Pelvis otherwise negative.     Decreased range of motion of right shoulder 10/08/2018     Depressive disorder      Diarrhea 07/24/2018     Difficult or painful urination 12/08/2009    Overview:  UPDATE:  Followed-up with Dr. Cassie Arteaga on 5-8-14. Noted that patient did not want to pay for the estrogen cream. Impression female stress incontinence and atrophic vaginitis. Exacerbation of her dysuria symptoms. She has not used her estrace vaginal cream due to cost noted 9-10-13. Urinalysis negative on 9-10-13. Urinalysis and urine microscopy showed some signs of urinary tract infecti     Drug-induced nausea and vomiting 07/17/2018     Dry mouth 05/24/2016     Gallbladder polyp 04/14/2012    Overview:  UPDATE:US ABDOMEN LIMITED RUQ 4-18-16: 5 mm stone versus polyp in the gallbladder, decreased in size since comparison study where measured 7 mm. The gallbladder is contracted despite being NPO, which limits evaluation. Benign parapelvic cyst in the lower pole of the right kidney measuring 2.8 cm, is unchanged. US ABDOMEN LIMITED RUQ on 2-3-14:  Stable appearance of a 8 mm cholesterol tristin     Ganglion cyst of flexor tendon sheath of finger of right hand 05/24/2016     History of breast cancer 11/21/2006    Overview:  UPDATE: XR MAMMO UNI SCREEN FFDM RIGHT 4-14-14: ACR 1 Negative. Followed-up with oncologist Dr. Phyllis Colon on 4-29-14. Stable.    XR MAMMO UNI SCREEN FFDM RIGHT: 4-4-12, 4-5-13: ACR 2 Benign Finding. Followed-up with Dr. Phyllis Colon 5-23-13. CBC and CMP normal except for low glucose of 55 . CA 27-29 normal.   Followed-up with Dr. Phyllis Colon 11-16-12. CBC and CMP normal exce     Hypercholesteremia 12/08/2014    Overview:  UPDATE: Rx atorvastatin (Lipitor) 1-22-15. She sent medical message on 3-18-15 requesting to take atorvastatin (Lipitor) 20 mg tablet every other day. This would not work for atorvastatin (Lipitor) . I advised to take half tablet (10 mg) daily rather than taking one tablet every other day. ASCVD Risk  10 year risk 7.9 % calculated on 12/8/2014.  Recommendation moderate to high -     IC (interstitial cystitis) 12/02/2010    Overview:  UPDATE: UPDATE: Followed-up with   Cassie Arteaga on 5-8-14. Noted that patient did not want to pay for the estrogen cream. Impression female stress incontinence and atrophic vaginitis. Exacerbation of her dysuria symptoms. She has not used her estrace vaginal cream due to cost noted 9-10-13. Urinalysis negative on 9-10-13. Urinalysis and urine microscopy showed some signs of urinary tract     Ileitis 07/25/2017    Overview:  COLONOSCOPY DIAGNOSTIC 7-25-17: Aphthous ulcer of ileum. Diverticulosis of colon without diverticulitis. Pathology Results: NEOTERMINAL ILEUM,  BIOPSY: Nonspecific chronic active ileitis. No dysplasia or malignancy. COLON, RANDOM, BIOPSY: Normal colonic mucosa . Advised to follow-up with [place] [facility] regarding chronic active ileitis.     Impaired fasting glucose 12/01/2006     Insomnia secondary to depression with anxiety 01/31/2017     Left leg swelling 05/13/2014    Overview:  US VENOUS LOWER EXTREMITY LEFT 5-14-14: Left leg veins are negative for DVT.     Left thyroid nodule 04/30/2018     Lymphedema of extremity 10/08/2018     Major depression, recurrent, full remission 01/31/2017    Overview:  Inpatient treatment for depression after divorce in 1983. Major depression diagnosed due to financial worries diagnosed 11-23-10 by oncologist, Louise Burns. Citalopram 20 mg daily started. Citalopram discontinued on 5-19-11 per patient request.     Malignant neoplasm of upper-inner quadrant of right breast in female, 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) 10/08/2018     Mixed stress and urge urinary incontinence 07/24/2006    Overview:  UPDATE: Followed-up with Dr. Cassie Arteaga on 5-8-14. Noted that patient did not want to pay for the estrogen cream. Impression female stress incontinence and atrophic vaginitis.  Exacerbation of her dysuria symptoms. She has not used her estrace vaginal cream due to cost noted 9-10-13. Urinalysis negative on 9-10-13. Urinalysis and urine microscopy showed some signs of urinary tract infect     Nasal congestion  06/05/2014     Osteopenia 11/01/2006    Overview:  UPDATE:  XR DXA BONE DENSITY 2 SITES AXIAL 5/16/2018: Osteopenia. Lumbar Spine L1-L4 T-score -1.8 . Mean Bilateral Femoral Neck T-score -1.4 . FRAX Results: 10-year probability of major osteoporotic fracture is 10.8%, and of hip fracture is 2%, based on left femoral neck BMD. Basic bone health recommended.  XR DXA BONE DENSITY 2 SITES AXIAL 4-18-16: T score AP spine -2.1, Left femoral ne     Other emphysema (H) 04/30/2018     Other specified disorders of nose and nasal sinuses 07/24/2018     Peripheral axonal neuropathy 11/22/2011    Overview:  UPDATE:  She thinks gabapentin (Neurontin) has been helpful without side effect of leg swelling discussed on 11-20-14. She agreed to increase dose to 300 mg at bedtime. She called on 10-30-14 requesting for gabapentin (Neurontin) as nortriptyline not helpful. Reminded her that she complained of leg swelling with gabapentin (Neurontin) in the past.  EMG 5-19-14: Borderline abnormal EMG du     Personal history of tobacco use, presenting hazards to health 10/22/2018    Overview:  20 pack years     Pharyngeal dysphagia 12/08/2009     Recurrent UTI 09/12/2013    Overview:  UPDATE: Followed-up with Dr. Cassie Arteaga on 5-8-14. Noted that patient did not want to pay for the estrogen cream. Impression female stress incontinence and atrophic vaginitis.  Exacerbation of her dysuria symptoms. She has not used her estrace vaginal cream due to cost noted 9-10-13. Urinalysis negative on 9-10-13. Urinalysis and urine microscopy showed some signs of urinary tract infect     Salivation excessive 12/17/2008    Overview:  may be due to postnasal drip causing excessive salivation as she tends to swallow the postnasal drainage.She is not candidate for tricyclic antidepressant like nortriptyline (can cause dry mouth) to lessen salivary secretions as this would excerbate her dry lips.     SOB (shortness of breath) 05/13/2014    Overview:  XR CHEST 2  VIEWS PA AND LATERAL 5-13-14: Impression: On the lateral view, a small patchy opacity is again visualized and has a few linear markings. This may be chronic, it is suggested on the prior exam slightly more prominent but this is probably due to technique, could represent chronic areas of subsegmental volume loss or previous consolidation. It is not well visualized on the PA vie     Tremor, essential 11/22/2011    Overview:  Neurology consult Dr. Luther Armenta on 3-26-12. Impression essential tremor. MRI brain. Neurontin 100 mg bid started to help tremors and bilateral lower extremity paresthesia thought due to impaired FG or prediabetes. Normal EMG of lower extremity on 4-11-12 but compound motor action potentials low which can be seen in early axonal neuropathy. Developed swelling with gabapentin (Neurontin     Vitamin D insufficiency 12/03/2012    Overview:  Vitamin D was low at 28.1 on 12-3-12. Take vitamin D 3000 units daily for 8 weeks, then take vitamin D 2000 units indefinitely. Vitamin D was normal at 40.8 on 3-6-13. Continue vitamin D 2000 units indefinitely.     Vitreous degeneration 10/22/2018    Overview:  Right eye     Past Surgical History:   Procedure Laterality Date     ABDOMEN SURGERY       APPENDECTOMY       BLEPHAROPLASTY Bilateral 11/07/2013     EXCISE NODULE THYROID Left 7/24/2024    Procedure: Left thyroid lobectomy and isthmusectomy, Vascular Port Removal;  Surgeon: Eryn Jose MD;  Location: [facility] OR     FINGER GANGLION CYST EXCISION Right 06/23/2016    right ring finger     LUMPECTOMY BREAST  11/21/2006    Infiltrating ductal carcinoma, micropapillary variant, [place]     MASTECTOMY MODIFIED RADICAL Left 12/06/2006     OTHER SURGICAL HISTORY Right 11/07/2013    RI REMOVE EYELID LESN (NOT CHALAZION)     OTHER SURGICAL HISTORY Right 05/18/2018    US BIOPSY BREAST NEEDLE W BIBIANA W GUIDE RIGHT     OTHER SURGICAL HISTORY Right 06/01/2018    RIGHT SIMPLE MASTECTOMY WITH RIGHT SENTINAL  LYMPH NODE [id number] AND RIGHT AXILLARY NODE DISSECTION     REMOVE PORT VASCULAR ACCESS Left 7/24/2024    Procedure: Vascular Port Removal  (Left);  Surgeon: Eryn Jose MD;  [place] [facility] [place]       Medications  Current Outpatient Medications   Medication Sig Dispense Refill     albuterol (ACCUNEB) 1.25 MG/3ML neb solution Take 1.25 mg by nebulization 2 times daily as needed for shortness of breath, wheezing or cough       albuterol (PROAIR HFA/PROVENTIL HFA/VENTOLIN HFA) 108 (90 Base) MCG/ACT inhaler Inhale 2 puffs into the lungs every 6 hours as needed for shortness of breath, wheezing or cough. 18 g 4     alendronate (FOSAMAX) 70 MG tablet Take 1 tablet (70 mg) by mouth every 7 days. 12 tablet 3     calcium 600 MG tablet Take 1 tablet (600 mg) by mouth 2 times daily 180 tablet 3     Cobalamin Combinations (VITAMIN B12-FOLIC ACID) 500-400 MCG TABS 1000iu 1/day       Cyanocobalamin (B-12) 1000 MCG TBCR Take 1,000 mcg by mouth daily       escitalopram (LEXAPRO) 10 MG tablet Take 1 tablet (10 mg) by mouth daily. 90 tablet 3     Fluticasone-Umeclidin-Vilant (TRELEGY ELLIPTA) 100-62.5-25 MCG/ACT oral inhaler Inhale 1 puff into the lungs daily. 180 each 3     ipratropium (ATROVENT) 0.06 % nasal spray Spray 2 sprays into both nostrils 4 times daily 15 mL 2     omeprazole (PRILOSEC) 20 MG DR capsule Take 1 capsule (20 mg) by mouth daily 90 capsule 0     No current facility-administered medications for this visit.       Allergies  Allergies   Allergen Reactions     Cephalexin Shortness Of Breath     Bladder burning     Amitriptyline Unknown     Gabapentin Swelling     Shortness of breath     Latex Swelling and Other (See Comments)     Sulfa Antibiotics Unknown     Tetracyclines & Related Unknown         Family History  family history includes Alcoholism in her mother; Allergic rhinitis in her sister; Arthritis in her maternal grandmother; Asthma in her sister; Breast Cancer in her maternal cousin; Cancer in her  maternal grandmother; Diabetes in her brother and brother; Hypertension in her brother, brother, maternal grandmother, and mother; Kidney Disease in her father; Osteoporosis in her maternal grandmother; Prostate Cancer in her father; Seizure Disorder in her father; Substance Abuse in her mother; Thyroid Disease in her brother; Ulcers in her mother.    Social History  Social History     Tobacco Use     Smoking status: Former     Current packs/day: 0.00     Average packs/day: 0.5 packs/day for 40.0 years (20.0 ttl pk-yrs)     Types: Cigarettes     Start date: 1965     Quit date: 2005     Years since quittin.2     Passive exposure: Past (Dad was a smoker)     Smokeless tobacco: Never   Substance Use Topics     Alcohol use: No     Comment: Alcoholic Drinks/day: occassionaly        Physical Exam  LMP  (LMP Unknown)   There is no height or weight on file to calculate BMI.  GENERAL :  In no apparent distress  NEURO: awake, alert, responds appropriately to questions.      DATA REVIEW  Labs/Imaging    TSH   Date Value Ref Range Status   2025 3.69 0.30 - 4.20 uIU/mL Final   10/22/2024 2.90 0.30 - 4.20 uIU/mL Final   2024 3.46 0.30 - 4.20 uIU/mL Final   2024 1.58 0.30 - 4.20 uIU/mL Final   10/13/2023 0.81 0.30 - 4.20 uIU/mL Final   A. 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 CHEST, VASCULAR PORT, REMOVAL:  - Gross-only examination only    EXAM: US THYROID  LOCATION: [facility]  DATE: 2025     INDICATION: s p left hemithyroidectomy, sub cm thyroid nodule Rt side, pls schedule US 2025  COMPARISON: None.  TECHNIQUE: Thyroid ultrasound.      FINDINGS:  RIGHT lobe: 3 x 1 x1 cm. Homogeneous echotexture.  Isthmus/LEFT lobe: Surgically absent.      NECK: Hypoechoic 0.6 x 0.6 x 0.6 cm structure superior to the hyoid. Color Doppler imaging  and through transmission suggest a cystic lesion.     NODULES:     Nodule 1: Superior right thyroid lobe. 0.6 x 0.6 x 0.4 cm, previously 0.7 x 0.5 x 0.4 cm.   Composition: Solid or almost completely solid, 2 points   Echogenicity: Hyperechoic or isoechoic, 1 point   Shape: Not taller than wide, 0 points   Margin: Ill-defined, 0 points   Echogenic Foci: None, or large comet-tail artifacts, 0 points   Point Total: 3 points. TI-RADS 3. If 2.5 cm or larger, recommend FNA; if 1.5 cm or larger, recommend follow up US at 1, 3, and 5 years.                                                                          IMPRESSION:  1.  Surgically absent isthmus and left thyroid lobe.     2.  Unchanged right thyroid nodule.     3.  Hypoechoic 0.6 cm structure adjacent to the hyoid is indeterminate. Lack of internal flow on color Doppler imaging and through transmission with its anechoic structure suggests a cystic component. Consider either follow-up ultrasound or   contrast-enhanced CT for further evaluation.      ASSESSMENT/PLAN:   ## Subcm right thyroid nodule  ## s/p left lobectomy Dr. Jose 7/2024 (patho benign)  ## Subcm lesion adjacent to the hyoid   TSH normal.   Regarding hoarse voice, patient will try her previous exercise and let me know if she would like an  ENT referral  -- Follow-up US 3 months for cystic lesion at hyoid area    Follow-up 3-4 months    The longitudinal plan of care for the diagnosis(es)/condition(s) as documented were addressed during this visit. Due to the added complexity in care, I will continue to support Mikala in the subsequent management and with ongoing continuity of care.      Jamison Wallace MD      Again, thank you for allowing me to participate in the care of your patient.        Sincerely,        Jamison Wallace MD    Electronically signed

## 2025-05-12 NOTE — PROGRESS NOTES
Video-Visit Details    Type of service:  Video Visit  Video Start Time: 0929  Video End Time: 0948  Originating Location (pt. Location): Home, [place]  Distant Location (provider location):  Home  Platform used for Video Visit: Rochelle Wallace MD      HISTORY OF PRESENT ILLNESS  Jody Ch is a 84 year old female with h/o breast cancer, chronic cough, pharyngeal dysphagia, MDD who is here for FU s/p hemithyroidectomy.    Interval History  No problem swallowing/ breathing  Voice is rough in the morning and normal during the day  Pt saw speech therapist and did exercise in the past with improvement  On meds for acid reflux and Fosamax  Energy level is ok, just got over dehydration    Initial visit  Pt has breast cancer and had a scan and incidentally found of thyroid nodule.  FNA 2018.  Pt moved and re-established  care with PCP, got US, nodule has grown and got a repeat FNA.  New hoarsness for >1 year, comes and goes  Choking with swallowing, got test at [facility]. Swallowing problem with liquid, and solid food. Eg. Hard boil egg, feeling like food stuck and need to drink water to follow  No changes in breathing    Brother s/p thyroid surgery  Cousin with thyroid problems    Currently breast cancer, 5 years in remission    REVIEW OF SYSTEMS  10 point negative except as mentioned in HPI    Past Medical/Surgical History:  Past Medical History:   Diagnosis Date    Anxiety state 01/31/2017    Arthritis     Atrophic vaginitis 05/08/2014    Overview:  UPDATE: Followed-up with Dr. Cassie Arteaga on 5-8-14. Noted that patient did not want to pay for the estrogen cream. Impression female stress incontinence and atrophic vaginitis.    Bilateral leg paresthesia 05/13/2014    Overview:  US FRANCISCO W EXERCISE BILATERAL 5-14-14: IMPRESSION: RIGHT LOWER EXTREMITY: FRANCISCO at rest is normal with a normal response to exercise. These findings would not be consistent with symptoms of arterial claudication. LEFT LOWER EXTREMITY:  FRANCISCO at rest is normal with a normal response to exercise. These findings would not be consistent with symptoms of arterial claudication. US VENOUS LOWER EXTREM    Bone pain 07/24/2018    Breast cancer (H)     Breast cancer, stage 2, right (H) 05/16/2018    Overview:  Added automatically from request for surgery [id number]    Chronic cough 02/12/2018    Overview:  XR CHEST 2 VIEWS PA AND LATERAL 12-4-17: Normal heart size and pulmonary vascularity. Tiny granulomas with some fibrosis in the right lung base. The left lung is clear. Slight deviation of the upper trachea from left to right presumably due to thyroid enlargement stable. No change from previous. No acute process is identified. No focal pulmonary infiltrates.    Chronic diarrhea 05/12/2017    Overview:  UPDATE: COLONOSCOPY DIAGNOSTIC 7-25-17: Aphthous ulcer of ileum. Diverticulosis of colon without diverticulitis. Pathology Results: NEOTERMINAL ILEUM,  BIOPSY: Nonspecific chronic active ileitis. No dysplasia or malignancy. COLON, RANDOM, BIOPSY: Normal colonic mucosa STOOL TESTS on 5-12-17 for culture, Clostridium dificile toxin, WBC, Giardia antigen, Campylobacter, Shiga toxin, Stool f    Chronic pain of right knee 10/23/2017    Overview:  XR KNEE 3 VIEWS RIGHT 10-23-17: Negative knee. No fracture or dislocation. No joint effusion.    Chronic rhinitis 06/08/2017    Chronic right hip pain 10/23/2017    Overview:  XR HIP 2 OR 3 VIEWS W PELVIS RIGHT 10-23-17: Arthritic change right hip. Pelvis otherwise negative.    Decreased range of motion of right shoulder 10/08/2018    Depressive disorder     Diarrhea 07/24/2018    Difficult or painful urination 12/08/2009    Overview:  UPDATE: Followed-up with Dr. Cassie Arteaga on 5-8-14. Noted that patient did not want to pay for the estrogen cream. Impression female stress incontinence and atrophic vaginitis. Exacerbation of her dysuria symptoms. She has not used her estrace vaginal cream due to cost noted 9-10-13. Urinalysis  negative on 9-10-13. Urinalysis and urine microscopy showed some signs of urinary tract infecti    Drug-induced nausea and vomiting 07/17/2018    Dry mouth 05/24/2016    Gallbladder polyp 04/14/2012    Overview:  UPDATE:US ABDOMEN LIMITED RUQ 4-18-16: 5 mm stone versus polyp in the gallbladder, decreased in size since comparison study where measured 7 mm. The gallbladder is contracted despite being NPO, which limits evaluation. Benign parapelvic cyst in the lower pole of the right kidney measuring 2.8 cm, is unchanged. US ABDOMEN LIMITED RUQ on 2-3-14:  Stable appearance of a 8 mm cholesterol tristin    Ganglion cyst of flexor tendon sheath of finger of right hand 05/24/2016    History of breast cancer 11/21/2006    Overview:  UPDATE: XR MAMMO UNI SCREEN FFDM RIGHT 4-14-14: ACR 1 Negative. Followed-up with oncologist Dr. Phyllis Colon on 4-29-14. Stable.    XR MAMMO UNI SCREEN FFDM RIGHT: 4-4-12, 4-5-13: ACR 2 Benign Finding. Followed-up with Dr. Phyllis Colon 5-23-13. CBC and CMP normal except for low glucose of 55 . CA 27-29 normal.   Followed-up with Dr. Phyllis Colon 11-16-12. CBC and CMP normal exce    Hypercholesteremia 12/08/2014    Overview:  UPDATE: Rx atorvastatin (Lipitor) 1-22-15. She sent medical message on 3-18-15 requesting to take atorvastatin (Lipitor) 20 mg tablet every other day. This would not work for atorvastatin (Lipitor) . I advised to take half tablet (10 mg) daily rather than taking one tablet every other day. ASCVD Risk  10 year risk 7.9 % calculated on 12/8/2014.  Recommendation moderate to high -    IC (interstitial cystitis) 12/02/2010    Overview:  UPDATE: UPDATE: Followed-up with Dr. Cassie Arteaga on 5-8-14. Noted that patient did not want to pay for the estrogen cream. Impression female stress incontinence and atrophic vaginitis. Exacerbation of her dysuria symptoms. She has not used her estrace vaginal cream due to cost noted 9-10-13. Urinalysis negative on 9-10-13.  Urinalysis and urine microscopy showed some signs of urinary tract    Ileitis 07/25/2017    Overview:  COLONOSCOPY DIAGNOSTIC 7-25-17: Aphthous ulcer of ileum. Diverticulosis of colon without diverticulitis. Pathology Results: NEOTERMINAL ILEUM,  BIOPSY: Nonspecific chronic active ileitis. No dysplasia or malignancy. COLON, RANDOM, BIOPSY: Normal colonic mucosa . Advised to follow-up with [place] [facility] regarding chronic active ileitis.    Impaired fasting glucose 12/01/2006    Insomnia secondary to depression with anxiety 01/31/2017    Left leg swelling 05/13/2014    Overview:  US VENOUS LOWER EXTREMITY LEFT 5-14-14: Left leg veins are negative for DVT.    Left thyroid nodule 04/30/2018    Lymphedema of extremity 10/08/2018    Major depression, recurrent, full remission 01/31/2017    Overview:  Inpatient treatment for depression after divorce in 1983. Major depression diagnosed due to financial worries diagnosed 11-23-10 by oncologist, Louise Burns. Citalopram 20 mg daily started. Citalopram discontinued on 5-19-11 per patient request.    Malignant neoplasm of upper-inner quadrant of right breast in female, 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) 10/08/2018    Mixed stress and urge urinary incontinence 07/24/2006    Overview:  UPDATE: Followed-up with Dr. Cassie Arteaga on 5-8-14. Noted that patient did not want to pay for the estrogen cream. Impression female stress incontinence and atrophic vaginitis.  Exacerbation of her dysuria symptoms. She has not used her estrace vaginal cream due to cost noted 9-10-13. Urinalysis negative on 9-10-13. Urinalysis and urine microscopy showed some signs of urinary tract infect    Nasal congestion 06/05/2014    Osteopenia 11/01/2006    Overview:  UPDATE:  XR DXA BONE DENSITY 2 SITES AXIAL 5/16/2018: Osteopenia. Lumbar Spine L1-L4 T-score -1.8 . Mean Bilateral Femoral Neck T-score -1.4 . FRAX Results: 10-year probability of major osteoporotic fracture is 10.8%, and of hip fracture is 2%, based on  left femoral neck BMD. Basic bone health recommended.  XR DXA BONE DENSITY 2 SITES AXIAL 4-18-16: T score AP spine -2.1, Left femoral ne    Other emphysema (H) 04/30/2018    Other specified disorders of nose and nasal sinuses 07/24/2018    Peripheral axonal neuropathy 11/22/2011    Overview:  UPDATE:  She thinks gabapentin (Neurontin) has been helpful without side effect of leg swelling discussed on 11-20-14. She agreed to increase dose to 300 mg at bedtime. She called on 10-30-14 requesting for gabapentin (Neurontin) as nortriptyline not helpful. Reminded her that she complained of leg swelling with gabapentin (Neurontin) in the past.  EMG 5-19-14: Borderline abnormal EMG du    Personal history of tobacco use, presenting hazards to health 10/22/2018    Overview:  20 pack years    Pharyngeal dysphagia 12/08/2009    Recurrent UTI 09/12/2013    Overview:  UPDATE: Followed-up with Dr. Cassie Arteaga on 5-8-14. Noted that patient did not want to pay for the estrogen cream. Impression female stress incontinence and atrophic vaginitis.  Exacerbation of her dysuria symptoms. She has not used her estrace vaginal cream due to cost noted 9-10-13. Urinalysis negative on 9-10-13. Urinalysis and urine microscopy showed some signs of urinary tract infect    Salivation excessive 12/17/2008    Overview:  may be due to postnasal drip causing excessive salivation as she tends to swallow the postnasal drainage.She is not candidate for tricyclic antidepressant like nortriptyline (can cause dry mouth) to lessen salivary secretions as this would excerbate her dry lips.    SOB (shortness of breath) 05/13/2014    Overview:  XR CHEST 2 VIEWS PA AND LATERAL 5-13-14: Impression: On the lateral view, a small patchy opacity is again visualized and has a few linear markings. This may be chronic, it is suggested on the prior exam slightly more prominent but this is probably due to technique, could represent chronic areas of subsegmental volume loss  or previous consolidation. It is not well visualized on the PA vie    Tremor, essential 11/22/2011    Overview:  Neurology consult Dr. Luther Armenta on 3-26-12. Impression essential tremor. MRI brain. Neurontin 100 mg bid started to help tremors and bilateral lower extremity paresthesia thought due to impaired FG or prediabetes. Normal EMG of lower extremity on 4-11-12 but compound motor action potentials low which can be seen in early axonal neuropathy. Developed swelling with gabapentin (Neurontin    Vitamin D insufficiency 12/03/2012    Overview:  Vitamin D was low at 28.1 on 12-3-12. Take vitamin D 3000 units daily for 8 weeks, then take vitamin D 2000 units indefinitely. Vitamin D was normal at 40.8 on 3-6-13. Continue vitamin D 2000 units indefinitely.    Vitreous degeneration 10/22/2018    Overview:  Right eye     Past Surgical History:   Procedure Laterality Date    ABDOMEN SURGERY      APPENDECTOMY      BLEPHAROPLASTY Bilateral 11/07/2013    EXCISE NODULE THYROID Left 7/24/2024    Procedure: Left thyroid lobectomy and isthmusectomy, Vascular Port Removal;  Surgeon: Eryn Jose MD;  Location: [facility] [place] OR    FINGER GANGLION CYST EXCISION Right 06/23/2016    right ring finger    LUMPECTOMY BREAST  11/21/2006    Infiltrating ductal carcinoma, micropapillary variant, [place]    MASTECTOMY MODIFIED RADICAL Left 12/06/2006    OTHER SURGICAL HISTORY Right 11/07/2013    KY REMOVE EYELID LESN (NOT CHALAZION)    OTHER SURGICAL HISTORY Right 05/18/2018    US BIOPSY BREAST NEEDLE W BIBIANA W GUIDE RIGHT    OTHER SURGICAL HISTORY Right 06/01/2018    RIGHT SIMPLE MASTECTOMY WITH RIGHT SENTINAL LYMPH NODE [id number] AND RIGHT AXILLARY NODE DISSECTION    REMOVE PORT VASCULAR ACCESS Left 7/24/2024    Procedure: Vascular Port Removal  (Left);  Surgeon: Eryn Jose MD;  Location: [facility] [place] OR       Medications  Current Outpatient Medications   Medication Sig Dispense Refill    albuterol (ACCUNEB) 1.25 MG/3ML neb  solution Take 1.25 mg by nebulization 2 times daily as needed for shortness of breath, wheezing or cough      albuterol (PROAIR HFA/PROVENTIL HFA/VENTOLIN HFA) 108 (90 Base) MCG/ACT inhaler Inhale 2 puffs into the lungs every 6 hours as needed for shortness of breath, wheezing or cough. 18 g 4    alendronate (FOSAMAX) 70 MG tablet Take 1 tablet (70 mg) by mouth every 7 days. 12 tablet 3    calcium 600 MG tablet Take 1 tablet (600 mg) by mouth 2 times daily 180 tablet 3    Cobalamin Combinations (VITAMIN B12-FOLIC ACID) 500-400 MCG TABS 1000iu 1/day      Cyanocobalamin (B-12) 1000 MCG TBCR Take 1,000 mcg by mouth daily      escitalopram (LEXAPRO) 10 MG tablet Take 1 tablet (10 mg) by mouth daily. 90 tablet 3    Fluticasone-Umeclidin-Vilant (TRELEGY ELLIPTA) 100-62.5-25 MCG/ACT oral inhaler Inhale 1 puff into the lungs daily. 180 each 3    ipratropium (ATROVENT) 0.06 % nasal spray Spray 2 sprays into both nostrils 4 times daily 15 mL 2    omeprazole (PRILOSEC) 20 MG DR capsule Take 1 capsule (20 mg) by mouth daily 90 capsule 0     No current facility-administered medications for this visit.       Allergies  Allergies   Allergen Reactions    Cephalexin Shortness Of Breath     Bladder burning    Amitriptyline Unknown    Gabapentin Swelling     Shortness of breath    Latex Swelling and Other (See Comments)    Sulfa Antibiotics Unknown    Tetracyclines & Related Unknown         Family History  family history includes Alcoholism in her mother; Allergic rhinitis in her sister; Arthritis in her maternal grandmother; Asthma in her sister; Breast Cancer in her maternal cousin; Cancer in her maternal grandmother; Diabetes in her brother and brother; Hypertension in her brother, brother, maternal grandmother, and mother; Kidney Disease in her father; Osteoporosis in her maternal grandmother; Prostate Cancer in her father; Seizure Disorder in her father; Substance Abuse in her mother; Thyroid Disease in her brother; Ulcers in her  mother.    Social History  Social History     Tobacco Use    Smoking status: Former     Current packs/day: 0.00     Average packs/day: 0.5 packs/day for 40.0 years (20.0 ttl pk-yrs)     Types: Cigarettes     Start date: 1965     Quit date: 2005     Years since quittin.2     Passive exposure: Past (Dad was a smoker)    Smokeless tobacco: Never   Substance Use Topics    Alcohol use: No     Comment: Alcoholic Drinks/day: occassionaly        Physical Exam  LMP  (LMP Unknown)   There is no height or weight on file to calculate BMI.  GENERAL :  In no apparent distress  NEURO: awake, alert, responds appropriately to questions.      DATA REVIEW  Labs/Imaging    TSH   Date Value Ref Range Status   2025 3.69 0.30 - 4.20 uIU/mL Final   10/22/2024 2.90 0.30 - 4.20 uIU/mL Final   2024 3.46 0.30 - 4.20 uIU/mL Final   2024 1.58 0.30 - 4.20 uIU/mL Final   10/13/2023 0.81 0.30 - 4.20 uIU/mL Final   A. 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 CHEST, VASCULAR PORT, REMOVAL:  - Gross-only examination only    EXAM: US THYROID  LOCATION: [facility]  DATE: 2025     INDICATION: s p left hemithyroidectomy, sub cm thyroid nodule Rt side, pls schedule US 2025  COMPARISON: None.  TECHNIQUE: Thyroid ultrasound.      FINDINGS:  RIGHT lobe: 3 x 1 x1 cm. Homogeneous echotexture.  Isthmus/LEFT lobe: Surgically absent.      NECK: Hypoechoic 0.6 x 0.6 x 0.6 cm structure superior to the hyoid. Color Doppler imaging and through transmission suggest a cystic lesion.     NODULES:     Nodule 1: Superior right thyroid lobe. 0.6 x 0.6 x 0.4 cm, previously 0.7 x 0.5 x 0.4 cm.   Composition: Solid or almost completely solid, 2 points   Echogenicity: Hyperechoic or isoechoic, 1 point   Shape: Not taller than wide, 0 points   Margin: Ill-defined, 0 points    Echogenic Foci: None, or large comet-tail artifacts, 0 points   Point Total: 3 points. TI-RADS 3. If 2.5 cm or larger, recommend FNA; if 1.5 cm or larger, recommend follow up US at 1, 3, and 5 years.                                                                          IMPRESSION:  1.  Surgically absent isthmus and left thyroid lobe.     2.  Unchanged right thyroid nodule.     3.  Hypoechoic 0.6 cm structure adjacent to the hyoid is indeterminate. Lack of internal flow on color Doppler imaging and through transmission with its anechoic structure suggests a cystic component. Consider either follow-up ultrasound or   contrast-enhanced CT for further evaluation.      ASSESSMENT/PLAN:   ## Subcm right thyroid nodule  ## s/p left lobectomy Dr. Jose 7/2024 (patho benign)  ## Subcm lesion adjacent to the hyoid   TSH normal.   Regarding hoarse voice, patient will try her previous exercise and let me know if she would like an  ENT referral  -- Follow-up US 3 months for cystic lesion at hyoid area    Follow-up 3-4 months    The longitudinal plan of care for the diagnosis(es)/condition(s) as documented were addressed during this visit. Due to the added complexity in care, I will continue to support Mikala in the subsequent management and with ongoing continuity of care.      Jamison Wallace MD

## 2025-05-12 NOTE — NURSING NOTE
Current patient location: [address] [place] [address] [address]  [address] [place] [address]    Is the patient currently in the state of [place]? YES    Visit mode: VIDEO    If the visit is dropped, the patient can be reconnected by:VIDEO VISIT: Send to e-mail at: [email or web address]    Will anyone else be joining the visit? NO  (If patient encounters technical issues they should call [phone]    Are changes needed to the allergy or medication list? No    Are refills needed on medications prescribed by this physician? NO    Rooming Documentation:  Questionnaire(s) completed    Reason for visit: KATIE JARQUIN

## 2025-07-03 ENCOUNTER — VIRTUAL VISIT (OUTPATIENT)
Dept: FAMILY MEDICINE | Facility: CLINIC | Age: 85
End: 2025-07-03
Payer: MEDICARE

## 2025-07-03 DIAGNOSIS — J44.9 CHRONIC OBSTRUCTIVE PULMONARY DISEASE, UNSPECIFIED COPD TYPE (H): ICD-10-CM

## 2025-07-03 DIAGNOSIS — B37.0 THRUSH: Primary | ICD-10-CM

## 2025-07-03 DIAGNOSIS — R09.82 POST-NASAL DRIP: ICD-10-CM

## 2025-07-03 PROBLEM — J43.9 PULMONARY EMPHYSEMA (H): Chronic | Status: RESOLVED | Noted: 2019-10-08 | Resolved: 2025-07-03

## 2025-07-03 RX ORDER — NYSTATIN 100000 [USP'U]/ML
500000 SUSPENSION ORAL 4 TIMES DAILY
Qty: 140 ML | Refills: 0 | Status: SHIPPED | OUTPATIENT
Start: 2025-07-03 | End: 2025-07-10

## 2025-07-03 ASSESSMENT — PATIENT HEALTH QUESTIONNAIRE - PHQ9: SUM OF ALL RESPONSES TO PHQ QUESTIONS 1-9: 0

## 2025-07-03 NOTE — PROGRESS NOTES
"Mikala is a 84 year old who is being evaluated via a billable video visit.    How would you like to obtain your AVS? MyChart  If the video visit is dropped, the invitation should be resent by: Text to cell phone: [phone]  Will anyone else be joining your video visit? No      Assessment & Plan   Thrush  Pt concerned for thrush. Use Trelegy. Has also been ill. Tongue feels like it is inflamed and she thinks there is a white coating on her tongue. Difficult to tell over video call, but treatment for this is low risk. Will start Nystatin. Did consider this to be part of her viral syndrome over the last week. If no improvement follow-up in person with PCP/team.   - nystatin (MYCOSTATIN) 260227 UNIT/ML suspension; Swish and spit 5 mLs (500,000 Units) in mouth 4 times daily for 7 days.    Post-nasal drip  Lost of sinus drainage still. Has not been using any medications. Recommended to trial Zrytec.     Chronic obstructive pulmonary disease, unspecified COPD type (H)  No obvious exacerbation based on todays exam, or her symptoms. Monitor closely and follow-up as needed.       BMI  Estimated body mass index is 29.85 kg/m  as calculated from the following:    Height as of 10/24/24: 1.562 m (5' 1.5\").    Weight as of 10/24/24: 72.8 kg (160 lb 9.6 oz).     Subjective   Mikala is a 84 year old, presenting for the following health issues:  Mouth Problem        10/22/2024    10:03 AM   Additional Questions   Roomed by RADHA Collazo       Video Start Time: 9:25 AM    HPI   Concern - mouth issue   Onset: about a week   Description: says that she has had a cold which has been different then colds that she has had in the past. Says that her tongue feels thick has a bump on it and is sore.   Intensity: mild to moderate  Progression of Symptoms:  same  Accompanying Signs & Symptoms: bad breath   Previous history of similar problem: no   Precipitating factors:        Worsened by: na   Alleviating factors:        Improved by: na "   Therapies tried and outcome:  none   Lots of post-nasal drip       Review of Systems  See HPI       Objective       Vitals:  No vitals were obtained today due to virtual visit.    Physical Exam   GENERAL: alert and no distress  EYES: Eyes grossly normal to inspection.  No discharge or erythema, or obvious scleral/conjunctival abnormalities.  RESP: No audible wheeze, cough, or visible cyanosis.    SKIN: Visible skin clear. No significant rash, abnormal pigmentation or lesions.  NEURO: Cranial nerves grossly intact.  Mentation and speech appropriate for age.  PSYCH: Appropriate affect, tone, and pace of words        Video-Visit Details    Type of service:  Video Visit   Video End Time:9:34 AM  Originating Location (pt. Location): Home    Distant Location (provider location):  On-site  Platform used for Video Visit: Rochelle  Signed Electronically by: Lyle Valentine PA-C

## 2025-07-11 DIAGNOSIS — K21.9 GASTRO-ESOPHAGEAL REFLUX DISEASE WITHOUT ESOPHAGITIS: ICD-10-CM

## 2025-07-11 NOTE — TELEPHONE ENCOUNTER
Medication Question or Refill    Patient hoping to have by the weekend and requesting it be marked high priority, patient requesting a call back if sent     [phone]      What medication are you calling about (include dose and sig)?: Omeprazole     Preferred Pharmacy:    [facility] #[id number] - [place], [place] - [address] [address]  [address] [address]  Phone: [phone] Fax: [phone]    Who prescribed the medication?: Amparo Mays      Do you need a refill? Yes      Could we send this information to you in [place] or would you prefer to receive a phone call?:   Patient would prefer a phone call   Okay to leave a detailed message?: Yes at Cell number on file:          SARAY Chong

## 2025-07-14 RX ORDER — OMEPRAZOLE 20 MG/1
20 CAPSULE, DELAYED RELEASE ORAL DAILY
Qty: 90 CAPSULE | Refills: 0 | Status: SHIPPED | OUTPATIENT
Start: 2025-07-14

## 2025-07-14 NOTE — TELEPHONE ENCOUNTER
Pt calling back - still waiting for this med refill. Hoping this can be sent in today. Would like it sent to [facility] pharmacy.    Harmony Ho Patient

## 2025-07-17 ENCOUNTER — OFFICE VISIT (OUTPATIENT)
Dept: FAMILY MEDICINE | Facility: CLINIC | Age: 85
End: 2025-07-17
Payer: MEDICARE

## 2025-07-17 VITALS
RESPIRATION RATE: 16 BRPM | DIASTOLIC BLOOD PRESSURE: 84 MMHG | OXYGEN SATURATION: 95 % | HEIGHT: 62 IN | WEIGHT: 160 LBS | HEART RATE: 97 BPM | TEMPERATURE: 98.7 F | SYSTOLIC BLOOD PRESSURE: 134 MMHG | BODY MASS INDEX: 29.44 KG/M2

## 2025-07-17 DIAGNOSIS — R05.3 CHRONIC COUGH: ICD-10-CM

## 2025-07-17 DIAGNOSIS — J06.9 URI WITH COUGH AND CONGESTION: Primary | ICD-10-CM

## 2025-07-17 PROCEDURE — 99213 OFFICE O/P EST LOW 20 MIN: CPT | Performed by: FAMILY MEDICINE

## 2025-07-17 PROCEDURE — 3079F DIAST BP 80-89 MM HG: CPT | Performed by: FAMILY MEDICINE

## 2025-07-17 PROCEDURE — 3075F SYST BP GE 130 - 139MM HG: CPT | Performed by: FAMILY MEDICINE

## 2025-08-12 ENCOUNTER — HOSPITAL ENCOUNTER (OUTPATIENT)
Dept: ULTRASOUND IMAGING | Facility: HOSPITAL | Age: 85
Discharge: HOME | End: 2025-08-12
Attending: INTERNAL MEDICINE
Payer: MEDICARE

## 2025-08-12 DIAGNOSIS — E04.1 CYST OF THYROID: ICD-10-CM

## 2025-08-12 PROCEDURE — 76536 US EXAM OF HEAD AND NECK: CPT

## 2025-08-16 ENCOUNTER — RESULTS FOLLOW-UP (OUTPATIENT)
Dept: ENDOCRINOLOGY | Facility: CLINIC | Age: 85
End: 2025-08-16
Payer: MEDICARE

## 2025-08-26 ENCOUNTER — VIRTUAL VISIT (OUTPATIENT)
Dept: ENDOCRINOLOGY | Facility: CLINIC | Age: 85
End: 2025-08-26
Payer: MEDICARE

## 2025-08-26 DIAGNOSIS — R49.0 HOARSENESS OF VOICE: Primary | ICD-10-CM

## 2025-08-26 DIAGNOSIS — M89.9 DISORDER OF HYOID BONE: ICD-10-CM

## 2025-08-26 DIAGNOSIS — E04.1 THYROID NODULE: ICD-10-CM

## 2025-08-26 PROCEDURE — 98006 SYNCH AUDIO-VIDEO EST MOD 30: CPT | Performed by: INTERNAL MEDICINE

## 2025-08-26 PROCEDURE — 1126F AMNT PAIN NOTED NONE PRSNT: CPT | Mod: 95 | Performed by: INTERNAL MEDICINE

## 2025-08-26 ASSESSMENT — PAIN SCALES - GENERAL: PAINLEVEL_OUTOF10: NO PAIN (0)

## 2025-08-27 ENCOUNTER — PATIENT OUTREACH (OUTPATIENT)
Dept: CARE COORDINATION | Facility: CLINIC | Age: 85
End: 2025-08-27
Payer: MEDICARE

## 2025-08-28 ENCOUNTER — TELEPHONE (OUTPATIENT)
Dept: OTOLARYNGOLOGY | Facility: CLINIC | Age: 85
End: 2025-08-28
Payer: MEDICARE

## (undated) DEVICE — SPECIMEN CONTAINER W/10% BUFFERED FORMALIN 120ML 591201

## (undated) DEVICE — TUBE ENDOTRACHEAL NIM TRIVANTAGE 6.0MM 8229706

## (undated) DEVICE — ESU ELEC BLADE 2.75" COATED/INSULATED E1455

## (undated) DEVICE — LINEN TOWEL PACK X5 5464

## (undated) DEVICE — ESU GROUND PAD ADULT W/CORD E7507

## (undated) DEVICE — SU DERMABOND ADVANCED .7ML DNX12

## (undated) DEVICE — SURGICEL FIBRILLAR HEMOSTAT 2"X4" JJ1962

## (undated) DEVICE — CLIP HORIZON SM RED WIDE SLOT 001201

## (undated) DEVICE — PREP PAD ALCOHOL 6818

## (undated) DEVICE — SU MONOCRYL 5-0 P-3 18" UND Y493G

## (undated) DEVICE — SU SILK 3-0 TIE 12X30" A304H

## (undated) DEVICE — SU CHROMIC 3-0 SH 27" G122H

## (undated) DEVICE — STRAP KNEE/BODY 31143004

## (undated) DEVICE — CLIP HORIZON MED BLUE 002200

## (undated) DEVICE — GOWN LG DISP 9515

## (undated) DEVICE — ESU LIGASURE DISSECTOR EXACT LF2019

## (undated) DEVICE — PACK ENT MINOR CUSTOM ASC

## (undated) DEVICE — PREP CHLORAPREP W/ORANGE TINT 10.5ML 260715

## (undated) DEVICE — SU SILK 2-0 TIE 12X30" A305H

## (undated) DEVICE — NIM PROBE NS STD INCR PRASS TIP STRL LF DISP 8225825X

## (undated) DEVICE — DRSG TEGADERM 2 3/8X2 3/4" 1624W

## (undated) DEVICE — ESU PENCIL SMOKE EVAC W/ROCKER SWITCH 0703-047-000

## (undated) DEVICE — SOL NACL 0.9% IRRIG 500ML BOTTLE 2F7123

## (undated) DEVICE — SUCTION MANIFOLD NEPTUNE 2 SYS 1 PORT 702-025-000

## (undated) DEVICE — GLOVE BIOGEL PI MICRO SZ 6.5 48565

## (undated) RX ORDER — PROPOFOL 10 MG/ML
INJECTION, EMULSION INTRAVENOUS
Status: DISPENSED
Start: 2024-07-24

## (undated) RX ORDER — DEXAMETHASONE SODIUM PHOSPHATE 10 MG/ML
INJECTION, SOLUTION INTRAMUSCULAR; INTRAVENOUS
Status: DISPENSED
Start: 2024-07-24

## (undated) RX ORDER — ACETAMINOPHEN 325 MG/1
TABLET ORAL
Status: DISPENSED
Start: 2024-07-24

## (undated) RX ORDER — FENTANYL CITRATE-0.9 % NACL/PF 10 MCG/ML
PLASTIC BAG, INJECTION (ML) INTRAVENOUS
Status: DISPENSED
Start: 2024-07-24

## (undated) RX ORDER — ONDANSETRON 2 MG/ML
INJECTION INTRAMUSCULAR; INTRAVENOUS
Status: DISPENSED
Start: 2024-07-24

## (undated) RX ORDER — REMIFENTANIL HYDROCHLORIDE 1 MG/ML
INJECTION, POWDER, LYOPHILIZED, FOR SOLUTION INTRAVENOUS
Status: DISPENSED
Start: 2024-07-24

## (undated) RX ORDER — GLYCOPYRROLATE 0.2 MG/ML
INJECTION INTRAMUSCULAR; INTRAVENOUS
Status: DISPENSED
Start: 2024-07-24

## (undated) RX ORDER — FENTANYL CITRATE 50 UG/ML
INJECTION, SOLUTION INTRAMUSCULAR; INTRAVENOUS
Status: DISPENSED
Start: 2024-07-24